# Patient Record
Sex: MALE | Race: WHITE | NOT HISPANIC OR LATINO | Employment: OTHER | URBAN - METROPOLITAN AREA
[De-identification: names, ages, dates, MRNs, and addresses within clinical notes are randomized per-mention and may not be internally consistent; named-entity substitution may affect disease eponyms.]

---

## 2017-01-09 ENCOUNTER — OFFICE VISIT (OUTPATIENT)
Dept: CARDIAC REHAB | Facility: HOSPITAL | Age: 82
End: 2017-01-09

## 2017-01-09 DIAGNOSIS — Z95.5 HISTORY OF PLACEMENT OF STENT IN LAD CORONARY ARTERY: Primary | ICD-10-CM

## 2017-01-13 ENCOUNTER — OFFICE VISIT (OUTPATIENT)
Dept: CARDIAC REHAB | Facility: HOSPITAL | Age: 82
End: 2017-01-13

## 2017-01-13 DIAGNOSIS — Z95.5 HISTORY OF PLACEMENT OF STENT IN LAD CORONARY ARTERY: Primary | ICD-10-CM

## 2017-01-16 ENCOUNTER — OFFICE VISIT (OUTPATIENT)
Dept: CARDIAC REHAB | Facility: HOSPITAL | Age: 82
End: 2017-01-16

## 2017-01-16 DIAGNOSIS — Z95.5 HISTORY OF PLACEMENT OF STENT IN LAD CORONARY ARTERY: Primary | ICD-10-CM

## 2017-01-18 ENCOUNTER — OFFICE VISIT (OUTPATIENT)
Dept: CARDIAC REHAB | Facility: HOSPITAL | Age: 82
End: 2017-01-18

## 2017-01-18 DIAGNOSIS — Z95.5 HISTORY OF PLACEMENT OF STENT IN LAD CORONARY ARTERY: Primary | ICD-10-CM

## 2017-01-20 ENCOUNTER — OFFICE VISIT (OUTPATIENT)
Dept: CARDIAC REHAB | Facility: HOSPITAL | Age: 82
End: 2017-01-20

## 2017-01-20 DIAGNOSIS — Z95.5 HISTORY OF PLACEMENT OF STENT IN LAD CORONARY ARTERY: Primary | ICD-10-CM

## 2017-01-23 ENCOUNTER — TRANSCRIBE ORDERS (OUTPATIENT)
Dept: CARDIAC REHAB | Facility: HOSPITAL | Age: 82
End: 2017-01-23

## 2017-01-23 ENCOUNTER — OFFICE VISIT (OUTPATIENT)
Dept: CARDIAC REHAB | Facility: HOSPITAL | Age: 82
End: 2017-01-23

## 2017-01-23 DIAGNOSIS — Z95.5 HISTORY OF PLACEMENT OF STENT IN LAD CORONARY ARTERY: Primary | ICD-10-CM

## 2017-01-25 ENCOUNTER — OFFICE VISIT (OUTPATIENT)
Dept: CARDIAC REHAB | Facility: HOSPITAL | Age: 82
End: 2017-01-25

## 2017-01-25 DIAGNOSIS — Z95.5 HISTORY OF PLACEMENT OF STENT IN LAD CORONARY ARTERY: Primary | ICD-10-CM

## 2017-01-30 ENCOUNTER — APPOINTMENT (OUTPATIENT)
Dept: CARDIAC REHAB | Facility: HOSPITAL | Age: 82
End: 2017-01-30

## 2017-01-30 ENCOUNTER — OFFICE VISIT (OUTPATIENT)
Dept: CARDIAC REHAB | Facility: HOSPITAL | Age: 82
End: 2017-01-30

## 2017-01-30 DIAGNOSIS — Z95.5 HISTORY OF PLACEMENT OF STENT IN LAD CORONARY ARTERY: Primary | ICD-10-CM

## 2017-02-01 ENCOUNTER — OFFICE VISIT (OUTPATIENT)
Dept: CARDIAC REHAB | Facility: HOSPITAL | Age: 82
End: 2017-02-01

## 2017-02-01 ENCOUNTER — APPOINTMENT (OUTPATIENT)
Dept: CARDIAC REHAB | Facility: HOSPITAL | Age: 82
End: 2017-02-01

## 2017-02-01 DIAGNOSIS — Z95.5 HISTORY OF PLACEMENT OF STENT IN LAD CORONARY ARTERY: Primary | ICD-10-CM

## 2017-02-03 ENCOUNTER — OFFICE VISIT (OUTPATIENT)
Dept: CARDIAC REHAB | Facility: HOSPITAL | Age: 82
End: 2017-02-03

## 2017-02-03 DIAGNOSIS — Z95.5 HISTORY OF PLACEMENT OF STENT IN LAD CORONARY ARTERY: Primary | ICD-10-CM

## 2017-02-06 ENCOUNTER — OFFICE VISIT (OUTPATIENT)
Dept: CARDIAC REHAB | Facility: HOSPITAL | Age: 82
End: 2017-02-06

## 2017-02-06 DIAGNOSIS — Z95.5 HISTORY OF PLACEMENT OF STENT IN LAD CORONARY ARTERY: Primary | ICD-10-CM

## 2017-02-08 ENCOUNTER — OFFICE VISIT (OUTPATIENT)
Dept: CARDIAC REHAB | Facility: HOSPITAL | Age: 82
End: 2017-02-08

## 2017-02-08 DIAGNOSIS — Z95.5 HISTORY OF PLACEMENT OF STENT IN LAD CORONARY ARTERY: Primary | ICD-10-CM

## 2017-02-27 ENCOUNTER — OFFICE VISIT (OUTPATIENT)
Dept: CARDIAC REHAB | Facility: HOSPITAL | Age: 82
End: 2017-02-27

## 2017-02-27 DIAGNOSIS — Z95.5 HISTORY OF PLACEMENT OF STENT IN LAD CORONARY ARTERY: Primary | ICD-10-CM

## 2017-03-01 ENCOUNTER — OFFICE VISIT (OUTPATIENT)
Dept: CARDIAC REHAB | Facility: HOSPITAL | Age: 82
End: 2017-03-01

## 2017-03-01 DIAGNOSIS — Z95.5 HISTORY OF PLACEMENT OF STENT IN LAD CORONARY ARTERY: Primary | ICD-10-CM

## 2017-03-03 ENCOUNTER — OFFICE VISIT (OUTPATIENT)
Dept: CARDIAC REHAB | Facility: HOSPITAL | Age: 82
End: 2017-03-03

## 2017-03-03 DIAGNOSIS — Z95.5 HISTORY OF PLACEMENT OF STENT IN LAD CORONARY ARTERY: Primary | ICD-10-CM

## 2017-03-06 ENCOUNTER — OFFICE VISIT (OUTPATIENT)
Dept: CARDIAC REHAB | Facility: HOSPITAL | Age: 82
End: 2017-03-06

## 2017-03-06 DIAGNOSIS — Z95.5 HISTORY OF PLACEMENT OF STENT IN LAD CORONARY ARTERY: Primary | ICD-10-CM

## 2017-03-08 ENCOUNTER — OFFICE VISIT (OUTPATIENT)
Dept: CARDIAC REHAB | Facility: HOSPITAL | Age: 82
End: 2017-03-08

## 2017-03-08 DIAGNOSIS — Z95.5 HISTORY OF PLACEMENT OF STENT IN LAD CORONARY ARTERY: Primary | ICD-10-CM

## 2017-03-10 ENCOUNTER — OFFICE VISIT (OUTPATIENT)
Dept: CARDIAC REHAB | Facility: HOSPITAL | Age: 82
End: 2017-03-10

## 2017-03-10 DIAGNOSIS — Z95.5 HISTORY OF PLACEMENT OF STENT IN LAD CORONARY ARTERY: Primary | ICD-10-CM

## 2017-03-13 ENCOUNTER — OFFICE VISIT (OUTPATIENT)
Dept: CARDIAC REHAB | Facility: HOSPITAL | Age: 82
End: 2017-03-13

## 2017-03-13 DIAGNOSIS — Z95.5 HISTORY OF PLACEMENT OF STENT IN LAD CORONARY ARTERY: Primary | ICD-10-CM

## 2017-03-15 ENCOUNTER — OFFICE VISIT (OUTPATIENT)
Dept: CARDIAC REHAB | Facility: HOSPITAL | Age: 82
End: 2017-03-15

## 2017-03-15 DIAGNOSIS — Z95.5 HISTORY OF PLACEMENT OF STENT IN LAD CORONARY ARTERY: Primary | ICD-10-CM

## 2017-03-20 ENCOUNTER — OFFICE VISIT (OUTPATIENT)
Dept: CARDIAC REHAB | Facility: HOSPITAL | Age: 82
End: 2017-03-20

## 2017-03-20 DIAGNOSIS — Z95.5 HISTORY OF PLACEMENT OF STENT IN LAD CORONARY ARTERY: Primary | ICD-10-CM

## 2017-03-22 ENCOUNTER — OFFICE VISIT (OUTPATIENT)
Dept: CARDIAC REHAB | Facility: HOSPITAL | Age: 82
End: 2017-03-22

## 2017-03-22 DIAGNOSIS — Z95.5 HISTORY OF PLACEMENT OF STENT IN LAD CORONARY ARTERY: Primary | ICD-10-CM

## 2017-03-24 ENCOUNTER — OFFICE VISIT (OUTPATIENT)
Dept: CARDIAC REHAB | Facility: HOSPITAL | Age: 82
End: 2017-03-24

## 2017-03-24 DIAGNOSIS — Z95.5 HISTORY OF PLACEMENT OF STENT IN LAD CORONARY ARTERY: Primary | ICD-10-CM

## 2017-03-27 ENCOUNTER — OFFICE VISIT (OUTPATIENT)
Dept: CARDIAC REHAB | Facility: HOSPITAL | Age: 82
End: 2017-03-27

## 2017-03-27 DIAGNOSIS — Z95.5 HISTORY OF PLACEMENT OF STENT IN LAD CORONARY ARTERY: Primary | ICD-10-CM

## 2017-03-29 ENCOUNTER — OFFICE VISIT (OUTPATIENT)
Dept: CARDIAC REHAB | Facility: HOSPITAL | Age: 82
End: 2017-03-29

## 2017-03-29 DIAGNOSIS — Z95.5 HISTORY OF PLACEMENT OF STENT IN LAD CORONARY ARTERY: Primary | ICD-10-CM

## 2017-03-31 ENCOUNTER — OFFICE VISIT (OUTPATIENT)
Dept: CARDIAC REHAB | Facility: HOSPITAL | Age: 82
End: 2017-03-31

## 2017-03-31 DIAGNOSIS — Z95.5 HISTORY OF PLACEMENT OF STENT IN LAD CORONARY ARTERY: Primary | ICD-10-CM

## 2017-04-05 ENCOUNTER — OFFICE VISIT (OUTPATIENT)
Dept: CARDIAC REHAB | Facility: HOSPITAL | Age: 82
End: 2017-04-05

## 2017-04-05 DIAGNOSIS — Z95.5 HISTORY OF PLACEMENT OF STENT IN LAD CORONARY ARTERY: Primary | ICD-10-CM

## 2017-04-07 ENCOUNTER — OFFICE VISIT (OUTPATIENT)
Dept: CARDIAC REHAB | Facility: HOSPITAL | Age: 82
End: 2017-04-07

## 2017-04-07 DIAGNOSIS — Z95.5 HISTORY OF PLACEMENT OF STENT IN LAD CORONARY ARTERY: Primary | ICD-10-CM

## 2017-04-10 ENCOUNTER — OFFICE VISIT (OUTPATIENT)
Dept: CARDIAC REHAB | Facility: HOSPITAL | Age: 82
End: 2017-04-10

## 2017-04-10 DIAGNOSIS — Z95.5 HISTORY OF PLACEMENT OF STENT IN LAD CORONARY ARTERY: Primary | ICD-10-CM

## 2017-04-12 ENCOUNTER — OFFICE VISIT (OUTPATIENT)
Dept: CARDIAC REHAB | Facility: HOSPITAL | Age: 82
End: 2017-04-12

## 2017-04-12 DIAGNOSIS — Z95.5 HISTORY OF PLACEMENT OF STENT IN LAD CORONARY ARTERY: Primary | ICD-10-CM

## 2017-04-17 ENCOUNTER — OFFICE VISIT (OUTPATIENT)
Dept: CARDIAC REHAB | Facility: HOSPITAL | Age: 82
End: 2017-04-17

## 2017-04-17 DIAGNOSIS — Z95.5 HISTORY OF PLACEMENT OF STENT IN LAD CORONARY ARTERY: Primary | ICD-10-CM

## 2017-04-19 ENCOUNTER — OFFICE VISIT (OUTPATIENT)
Dept: CARDIAC REHAB | Facility: HOSPITAL | Age: 82
End: 2017-04-19

## 2017-04-19 DIAGNOSIS — Z95.5 HISTORY OF PLACEMENT OF STENT IN LAD CORONARY ARTERY: Primary | ICD-10-CM

## 2017-04-26 ENCOUNTER — OFFICE VISIT (OUTPATIENT)
Dept: CARDIAC REHAB | Facility: HOSPITAL | Age: 82
End: 2017-04-26

## 2017-04-26 DIAGNOSIS — Z95.5 HISTORY OF PLACEMENT OF STENT IN LAD CORONARY ARTERY: Primary | ICD-10-CM

## 2017-04-28 ENCOUNTER — OFFICE VISIT (OUTPATIENT)
Dept: CARDIAC REHAB | Facility: HOSPITAL | Age: 82
End: 2017-04-28

## 2017-04-28 DIAGNOSIS — Z95.5 HISTORY OF PLACEMENT OF STENT IN LAD CORONARY ARTERY: Primary | ICD-10-CM

## 2017-05-01 ENCOUNTER — OFFICE VISIT (OUTPATIENT)
Dept: CARDIAC REHAB | Facility: HOSPITAL | Age: 82
End: 2017-05-01

## 2017-05-01 DIAGNOSIS — Z95.5 HISTORY OF PLACEMENT OF STENT IN LAD CORONARY ARTERY: Primary | ICD-10-CM

## 2017-05-03 ENCOUNTER — OFFICE VISIT (OUTPATIENT)
Dept: CARDIAC REHAB | Facility: HOSPITAL | Age: 82
End: 2017-05-03

## 2017-05-03 DIAGNOSIS — Z95.5 HISTORY OF PLACEMENT OF STENT IN LAD CORONARY ARTERY: Primary | ICD-10-CM

## 2017-05-08 ENCOUNTER — OFFICE VISIT (OUTPATIENT)
Dept: CARDIAC REHAB | Facility: HOSPITAL | Age: 82
End: 2017-05-08

## 2017-05-08 DIAGNOSIS — I10 ESSENTIAL HYPERTENSION: ICD-10-CM

## 2017-05-08 DIAGNOSIS — R73.01 IMPAIRED FASTING GLUCOSE: ICD-10-CM

## 2017-05-08 DIAGNOSIS — D64.89 ANEMIA DUE TO OTHER CAUSE, NOT CLASSIFIED: ICD-10-CM

## 2017-05-08 DIAGNOSIS — I25.10 ARTERIOSCLEROSIS OF CORONARY ARTERY: ICD-10-CM

## 2017-05-08 DIAGNOSIS — Z95.5 HISTORY OF PLACEMENT OF STENT IN LAD CORONARY ARTERY: Primary | ICD-10-CM

## 2017-05-08 DIAGNOSIS — E78.00 PURE HYPERCHOLESTEROLEMIA: Primary | ICD-10-CM

## 2017-05-10 ENCOUNTER — OFFICE VISIT (OUTPATIENT)
Dept: CARDIAC REHAB | Facility: HOSPITAL | Age: 82
End: 2017-05-10

## 2017-05-10 DIAGNOSIS — Z95.5 HISTORY OF PLACEMENT OF STENT IN LAD CORONARY ARTERY: Primary | ICD-10-CM

## 2017-05-10 LAB
ALBUMIN SERPL-MCNC: 3.8 G/DL (ref 3.5–5.2)
ALBUMIN/GLOB SERPL: 1.3 G/DL
ALP SERPL-CCNC: 61 U/L (ref 39–117)
ALT SERPL-CCNC: 22 U/L (ref 1–41)
APPEARANCE UR: CLEAR
AST SERPL-CCNC: 22 U/L (ref 1–40)
BACTERIA #/AREA URNS HPF: NORMAL /HPF
BASOPHILS # BLD AUTO: 0.04 10*3/MM3 (ref 0–0.2)
BASOPHILS NFR BLD AUTO: 0.7 % (ref 0–1.5)
BILIRUB SERPL-MCNC: 0.6 MG/DL (ref 0.1–1.2)
BILIRUB UR QL STRIP: NEGATIVE
BUN SERPL-MCNC: 14 MG/DL (ref 8–23)
BUN/CREAT SERPL: 16.5 (ref 7–25)
CALCIUM SERPL-MCNC: 9.5 MG/DL (ref 8.6–10.5)
CHLORIDE SERPL-SCNC: 101 MMOL/L (ref 98–107)
CHOLEST SERPL-MCNC: 116 MG/DL (ref 0–200)
CO2 SERPL-SCNC: 26.4 MMOL/L (ref 22–29)
COLOR UR: YELLOW
CREAT SERPL-MCNC: 0.85 MG/DL (ref 0.76–1.27)
EOSINOPHIL # BLD AUTO: 0.5 10*3/MM3 (ref 0–0.7)
EOSINOPHIL NFR BLD AUTO: 8.7 % (ref 0.3–6.2)
EPI CELLS #/AREA URNS HPF: NORMAL /HPF
ERYTHROCYTE [DISTWIDTH] IN BLOOD BY AUTOMATED COUNT: 14.5 % (ref 11.5–14.5)
GLOBULIN SER CALC-MCNC: 3 GM/DL
GLUCOSE SERPL-MCNC: 97 MG/DL (ref 65–99)
GLUCOSE UR QL: NEGATIVE
HBA1C MFR BLD: 5.2 % (ref 4.8–5.6)
HCT VFR BLD AUTO: 45.8 % (ref 40.4–52.2)
HDLC SERPL-MCNC: 32 MG/DL (ref 40–60)
HGB BLD-MCNC: 14.8 G/DL (ref 13.7–17.6)
HGB UR QL STRIP: NEGATIVE
IMM GRANULOCYTES # BLD: 0 10*3/MM3 (ref 0–0.03)
IMM GRANULOCYTES NFR BLD: 0 % (ref 0–0.5)
KETONES UR QL STRIP: NEGATIVE
LDLC SERPL CALC-MCNC: 70 MG/DL (ref 0–100)
LDLC/HDLC SERPL: 2.2 {RATIO}
LEUKOCYTE ESTERASE UR QL STRIP: NEGATIVE
LYMPHOCYTES # BLD AUTO: 1.52 10*3/MM3 (ref 0.9–4.8)
LYMPHOCYTES NFR BLD AUTO: 26.3 % (ref 19.6–45.3)
MCH RBC QN AUTO: 31.5 PG (ref 27–32.7)
MCHC RBC AUTO-ENTMCNC: 32.3 G/DL (ref 32.6–36.4)
MCV RBC AUTO: 97.4 FL (ref 79.8–96.2)
MICRO URNS: NORMAL
MICRO URNS: NORMAL
MONOCYTES # BLD AUTO: 0.56 10*3/MM3 (ref 0.2–1.2)
MONOCYTES NFR BLD AUTO: 9.7 % (ref 5–12)
MUCOUS THREADS URNS QL MICRO: PRESENT /HPF
NEUTROPHILS # BLD AUTO: 3.15 10*3/MM3 (ref 1.9–8.1)
NEUTROPHILS NFR BLD AUTO: 54.6 % (ref 42.7–76)
NITRITE UR QL STRIP: NEGATIVE
PH UR STRIP: 6 [PH] (ref 5–7.5)
PLATELET # BLD AUTO: 206 10*3/MM3 (ref 140–500)
POTASSIUM SERPL-SCNC: 4.5 MMOL/L (ref 3.5–5.2)
PROT SERPL-MCNC: 6.8 G/DL (ref 6–8.5)
PROT UR QL STRIP: NEGATIVE
RBC # BLD AUTO: 4.7 10*6/MM3 (ref 4.6–6)
RBC #/AREA URNS HPF: NORMAL /HPF
SODIUM SERPL-SCNC: 140 MMOL/L (ref 136–145)
SP GR UR: 1.02 (ref 1–1.03)
TRIGL SERPL-MCNC: 68 MG/DL (ref 0–150)
TSH SERPL DL<=0.005 MIU/L-ACNC: 3.18 MIU/ML (ref 0.27–4.2)
URINALYSIS REFLEX: NORMAL
UROBILINOGEN UR STRIP-MCNC: 1 MG/DL (ref 0.2–1)
VLDLC SERPL CALC-MCNC: 13.6 MG/DL (ref 5–40)
WBC # BLD AUTO: 5.77 10*3/MM3 (ref 4.5–10.7)
WBC #/AREA URNS HPF: NORMAL /HPF

## 2017-05-22 ENCOUNTER — OFFICE VISIT (OUTPATIENT)
Dept: CARDIAC REHAB | Facility: HOSPITAL | Age: 82
End: 2017-05-22

## 2017-05-22 DIAGNOSIS — Z95.5 HISTORY OF PLACEMENT OF STENT IN LAD CORONARY ARTERY: Primary | ICD-10-CM

## 2017-05-24 ENCOUNTER — OFFICE VISIT (OUTPATIENT)
Dept: CARDIAC REHAB | Facility: HOSPITAL | Age: 82
End: 2017-05-24

## 2017-05-24 DIAGNOSIS — Z95.5 HISTORY OF PLACEMENT OF STENT IN LAD CORONARY ARTERY: Primary | ICD-10-CM

## 2017-05-26 ENCOUNTER — OFFICE VISIT (OUTPATIENT)
Dept: INTERNAL MEDICINE | Facility: CLINIC | Age: 82
End: 2017-05-26

## 2017-05-26 ENCOUNTER — OFFICE VISIT (OUTPATIENT)
Dept: CARDIAC REHAB | Facility: HOSPITAL | Age: 82
End: 2017-05-26

## 2017-05-26 VITALS
HEIGHT: 72 IN | SYSTOLIC BLOOD PRESSURE: 132 MMHG | RESPIRATION RATE: 18 BRPM | BODY MASS INDEX: 33.18 KG/M2 | HEART RATE: 85 BPM | WEIGHT: 245 LBS | DIASTOLIC BLOOD PRESSURE: 82 MMHG | OXYGEN SATURATION: 96 %

## 2017-05-26 DIAGNOSIS — E78.00 PURE HYPERCHOLESTEROLEMIA: ICD-10-CM

## 2017-05-26 DIAGNOSIS — I48.20 CHRONIC ATRIAL FIBRILLATION (HCC): ICD-10-CM

## 2017-05-26 DIAGNOSIS — J30.2 SEASONAL ALLERGIC RHINITIS, UNSPECIFIED ALLERGIC RHINITIS TRIGGER: ICD-10-CM

## 2017-05-26 DIAGNOSIS — G47.33 OBSTRUCTIVE APNEA: ICD-10-CM

## 2017-05-26 DIAGNOSIS — M17.11 PRIMARY OSTEOARTHRITIS OF RIGHT KNEE: Primary | ICD-10-CM

## 2017-05-26 DIAGNOSIS — Z00.00 HEALTHCARE MAINTENANCE: ICD-10-CM

## 2017-05-26 DIAGNOSIS — I10 ESSENTIAL HYPERTENSION: ICD-10-CM

## 2017-05-26 DIAGNOSIS — I25.10 ARTERIOSCLEROSIS OF CORONARY ARTERY: ICD-10-CM

## 2017-05-26 DIAGNOSIS — Z95.5 HISTORY OF PLACEMENT OF STENT IN LAD CORONARY ARTERY: Primary | ICD-10-CM

## 2017-05-26 PROCEDURE — G0439 PPPS, SUBSEQ VISIT: HCPCS | Performed by: INTERNAL MEDICINE

## 2017-05-26 PROCEDURE — 99213 OFFICE O/P EST LOW 20 MIN: CPT | Performed by: INTERNAL MEDICINE

## 2017-05-26 RX ORDER — TROSPIUM CHLORIDE 20 MG/1
20 TABLET, FILM COATED ORAL 2 TIMES DAILY
COMMUNITY

## 2017-05-26 RX ORDER — FLUTICASONE PROPIONATE 50 MCG
2 SPRAY, SUSPENSION (ML) NASAL DAILY
Qty: 1 EACH | Refills: 6 | Status: SHIPPED | OUTPATIENT
Start: 2017-05-26 | End: 2017-06-25

## 2017-05-26 RX ORDER — NITROGLYCERIN 0.4 MG/1
0.4 TABLET SUBLINGUAL
COMMUNITY
Start: 2015-08-11

## 2017-05-31 ENCOUNTER — OFFICE VISIT (OUTPATIENT)
Dept: CARDIAC REHAB | Facility: HOSPITAL | Age: 82
End: 2017-05-31

## 2017-05-31 DIAGNOSIS — Z95.5 HISTORY OF PLACEMENT OF STENT IN LAD CORONARY ARTERY: Primary | ICD-10-CM

## 2017-06-02 ENCOUNTER — OFFICE VISIT (OUTPATIENT)
Dept: CARDIAC REHAB | Facility: HOSPITAL | Age: 82
End: 2017-06-02

## 2017-06-02 DIAGNOSIS — Z95.5 HISTORY OF PLACEMENT OF STENT IN LAD CORONARY ARTERY: Primary | ICD-10-CM

## 2017-06-05 ENCOUNTER — OFFICE VISIT (OUTPATIENT)
Dept: CARDIAC REHAB | Facility: HOSPITAL | Age: 82
End: 2017-06-05

## 2017-06-05 DIAGNOSIS — Z95.5 HISTORY OF PLACEMENT OF STENT IN LAD CORONARY ARTERY: Primary | ICD-10-CM

## 2017-06-07 ENCOUNTER — OFFICE VISIT (OUTPATIENT)
Dept: CARDIAC REHAB | Facility: HOSPITAL | Age: 82
End: 2017-06-07

## 2017-06-07 DIAGNOSIS — Z95.5 HISTORY OF PLACEMENT OF STENT IN LAD CORONARY ARTERY: Primary | ICD-10-CM

## 2017-06-12 ENCOUNTER — OFFICE VISIT (OUTPATIENT)
Dept: CARDIAC REHAB | Facility: HOSPITAL | Age: 82
End: 2017-06-12

## 2017-06-12 DIAGNOSIS — Z95.5 HISTORY OF PLACEMENT OF STENT IN LAD CORONARY ARTERY: Primary | ICD-10-CM

## 2017-06-14 ENCOUNTER — OFFICE VISIT (OUTPATIENT)
Dept: CARDIAC REHAB | Facility: HOSPITAL | Age: 82
End: 2017-06-14

## 2017-06-14 DIAGNOSIS — Z95.5 HISTORY OF PLACEMENT OF STENT IN LAD CORONARY ARTERY: Primary | ICD-10-CM

## 2017-06-19 ENCOUNTER — OFFICE VISIT (OUTPATIENT)
Dept: CARDIAC REHAB | Facility: HOSPITAL | Age: 82
End: 2017-06-19

## 2017-06-19 DIAGNOSIS — Z95.5 HISTORY OF PLACEMENT OF STENT IN LAD CORONARY ARTERY: Primary | ICD-10-CM

## 2017-06-21 ENCOUNTER — OFFICE VISIT (OUTPATIENT)
Dept: CARDIAC REHAB | Facility: HOSPITAL | Age: 82
End: 2017-06-21

## 2017-06-21 DIAGNOSIS — Z95.5 HISTORY OF PLACEMENT OF STENT IN LAD CORONARY ARTERY: Primary | ICD-10-CM

## 2017-06-23 ENCOUNTER — OFFICE VISIT (OUTPATIENT)
Dept: CARDIAC REHAB | Facility: HOSPITAL | Age: 82
End: 2017-06-23

## 2017-06-23 DIAGNOSIS — Z95.5 HISTORY OF PLACEMENT OF STENT IN LAD CORONARY ARTERY: Primary | ICD-10-CM

## 2017-06-26 ENCOUNTER — OFFICE VISIT (OUTPATIENT)
Dept: CARDIAC REHAB | Facility: HOSPITAL | Age: 82
End: 2017-06-26

## 2017-06-26 DIAGNOSIS — Z95.5 HISTORY OF PLACEMENT OF STENT IN LAD CORONARY ARTERY: Primary | ICD-10-CM

## 2017-06-28 ENCOUNTER — OFFICE VISIT (OUTPATIENT)
Dept: CARDIAC REHAB | Facility: HOSPITAL | Age: 82
End: 2017-06-28

## 2017-06-28 DIAGNOSIS — Z95.5 HISTORY OF PLACEMENT OF STENT IN LAD CORONARY ARTERY: Primary | ICD-10-CM

## 2017-06-30 ENCOUNTER — OFFICE VISIT (OUTPATIENT)
Dept: CARDIAC REHAB | Facility: HOSPITAL | Age: 82
End: 2017-06-30

## 2017-06-30 DIAGNOSIS — Z95.5 HISTORY OF PLACEMENT OF STENT IN LAD CORONARY ARTERY: Primary | ICD-10-CM

## 2017-07-05 ENCOUNTER — OFFICE VISIT (OUTPATIENT)
Dept: CARDIAC REHAB | Facility: HOSPITAL | Age: 82
End: 2017-07-05

## 2017-07-05 DIAGNOSIS — Z95.5 HISTORY OF PLACEMENT OF STENT IN LAD CORONARY ARTERY: Primary | ICD-10-CM

## 2017-07-07 ENCOUNTER — OFFICE VISIT (OUTPATIENT)
Dept: CARDIAC REHAB | Facility: HOSPITAL | Age: 82
End: 2017-07-07

## 2017-07-07 DIAGNOSIS — Z95.5 HISTORY OF PLACEMENT OF STENT IN LAD CORONARY ARTERY: Primary | ICD-10-CM

## 2017-07-10 ENCOUNTER — OFFICE VISIT (OUTPATIENT)
Dept: CARDIAC REHAB | Facility: HOSPITAL | Age: 82
End: 2017-07-10

## 2017-07-10 DIAGNOSIS — Z95.5 HISTORY OF PLACEMENT OF STENT IN LAD CORONARY ARTERY: Primary | ICD-10-CM

## 2017-07-12 ENCOUNTER — OFFICE VISIT (OUTPATIENT)
Dept: CARDIAC REHAB | Facility: HOSPITAL | Age: 82
End: 2017-07-12

## 2017-07-12 DIAGNOSIS — Z95.5 HISTORY OF PLACEMENT OF STENT IN LAD CORONARY ARTERY: Primary | ICD-10-CM

## 2017-07-14 ENCOUNTER — OFFICE VISIT (OUTPATIENT)
Dept: CARDIAC REHAB | Facility: HOSPITAL | Age: 82
End: 2017-07-14

## 2017-07-14 DIAGNOSIS — Z95.5 HISTORY OF PLACEMENT OF STENT IN LAD CORONARY ARTERY: Primary | ICD-10-CM

## 2017-07-17 ENCOUNTER — OFFICE VISIT (OUTPATIENT)
Dept: CARDIAC REHAB | Facility: HOSPITAL | Age: 82
End: 2017-07-17

## 2017-07-17 DIAGNOSIS — Z95.5 HISTORY OF PLACEMENT OF STENT IN LAD CORONARY ARTERY: Primary | ICD-10-CM

## 2017-07-19 ENCOUNTER — OFFICE VISIT (OUTPATIENT)
Dept: CARDIAC REHAB | Facility: HOSPITAL | Age: 82
End: 2017-07-19

## 2017-07-19 DIAGNOSIS — Z95.5 HISTORY OF PLACEMENT OF STENT IN LAD CORONARY ARTERY: Primary | ICD-10-CM

## 2017-07-21 ENCOUNTER — OFFICE VISIT (OUTPATIENT)
Dept: CARDIAC REHAB | Facility: HOSPITAL | Age: 82
End: 2017-07-21

## 2017-07-21 DIAGNOSIS — Z95.5 HISTORY OF PLACEMENT OF STENT IN LAD CORONARY ARTERY: Primary | ICD-10-CM

## 2017-07-24 ENCOUNTER — OFFICE VISIT (OUTPATIENT)
Dept: CARDIAC REHAB | Facility: HOSPITAL | Age: 82
End: 2017-07-24

## 2017-07-24 DIAGNOSIS — Z95.5 HISTORY OF PLACEMENT OF STENT IN LAD CORONARY ARTERY: Primary | ICD-10-CM

## 2017-07-28 ENCOUNTER — OFFICE VISIT (OUTPATIENT)
Dept: CARDIAC REHAB | Facility: HOSPITAL | Age: 82
End: 2017-07-28

## 2017-07-28 DIAGNOSIS — Z95.5 HISTORY OF PLACEMENT OF STENT IN LAD CORONARY ARTERY: Primary | ICD-10-CM

## 2017-07-31 ENCOUNTER — OFFICE VISIT (OUTPATIENT)
Dept: CARDIAC REHAB | Facility: HOSPITAL | Age: 82
End: 2017-07-31

## 2017-07-31 DIAGNOSIS — Z95.5 HISTORY OF PLACEMENT OF STENT IN LAD CORONARY ARTERY: Primary | ICD-10-CM

## 2017-08-07 ENCOUNTER — OFFICE VISIT (OUTPATIENT)
Dept: INTERNAL MEDICINE | Facility: CLINIC | Age: 82
End: 2017-08-07

## 2017-08-07 VITALS
OXYGEN SATURATION: 99 % | SYSTOLIC BLOOD PRESSURE: 140 MMHG | WEIGHT: 250 LBS | HEART RATE: 74 BPM | BODY MASS INDEX: 34.38 KG/M2 | DIASTOLIC BLOOD PRESSURE: 60 MMHG

## 2017-08-07 DIAGNOSIS — S30.0XXD TRAUMATIC ECCHYMOSIS OF BUTTOCK, SUBSEQUENT ENCOUNTER: ICD-10-CM

## 2017-08-07 DIAGNOSIS — I48.20 CHRONIC ATRIAL FIBRILLATION (HCC): ICD-10-CM

## 2017-08-07 DIAGNOSIS — S81.811A LACERATION OF LOWER EXTREMITY, RIGHT, INITIAL ENCOUNTER: Primary | ICD-10-CM

## 2017-08-07 PROCEDURE — 99214 OFFICE O/P EST MOD 30 MIN: CPT | Performed by: INTERNAL MEDICINE

## 2017-08-07 NOTE — PROGRESS NOTES
Chief Complaint   Patient presents with   • Follow-up     from ER  pt had a fall        History of Present Illness   Scar Pérez is a 83 y.o. male presents for follow up evaluation. Patient had a fall one week. Was playing [employer]. Swinging a broomstick and lost balance. Then fell and struck his right leg. Went to ER. Had negative CT head/ neck. Neg xray, rib, elbow, knee, tib fib. He has a persistent wound for which he went to wound care at [place] and is following there weekly. Doing home dressing changes w/ wife.   Today he denies any headaches or instablitiy.   Mild pain left rib. This is managed w/ tylenol arthritis strength.   He has afib and is rate controlled. On daily asa for this.       The following portions of the patient's history were reviewed and updated as appropriate: allergies, current medications, past family history, past medical history, past social history, past surgical history and problem list.  Current Outpatient Prescriptions on File Prior to Visit   Medication Sig Dispense Refill   • aspirin 325 MG tablet Take 1 tablet by mouth daily.     • atorvastatin (LIPITOR) 20 MG tablet Take 20 mg by mouth daily.     • Cholecalciferol (VITAMIN D3) 5000 UNITS tablet Take by mouth.     • Cyanocobalamin (B-12) 1000 MCG capsule Take 1 capsule by mouth.     • fluocinolone (SYNALAR) 0.01 % external solution Apply topically. APPLY TO AFFECTED AREA AS DIRECTED     • hydrocortisone (ANUSOL-HC) 25 MG suppository Hydrocortisone Acetate 25 MG Rectal Suppository; Patient Sig: Hydrocortisone Acetate 25 MG Rectal Suppository INSERT ONE SUPPOSITORY RECTALLY TWICE DAILY AS NEEDED; 12; 4; 19-Nov-2013; Active     • metoprolol tartrate (LOPRESSOR) 100 MG tablet Take 1 tablet by mouth 2 (two) times a day.     • niacin 500 MG tablet Take 1 tablet by mouth daily.     • nitroglycerin (NITROSTAT) 0.4 MG SL tablet Place 0.4 mg under the tongue.     • pantoprazole (PROTONIX) 40 MG EC tablet Take 1 tablet by mouth daily.      • trospium (SANCTURA) 20 MG tablet Take 20 mg by mouth.     • valsartan (DIOVAN) 80 MG tablet Take 80 mg by mouth Daily.       No current facility-administered medications on file prior to visit.      Review of Systems   Constitutional: Negative.    HENT: Negative.    Eyes: Negative.    Respiratory: Negative.  Negative for shortness of breath.    Cardiovascular: Negative.  Negative for palpitations and leg swelling.   Gastrointestinal: Negative.    Endocrine: Negative.    Genitourinary: Negative.    Musculoskeletal: Negative.    Skin: Negative.    Allergic/Immunologic: Negative.    Neurological: Negative.    Hematological: Negative.    Psychiatric/Behavioral: Negative.        Objective   Physical Exam   Constitutional: He appears well-developed and well-nourished.   HENT:   Head: Normocephalic and atraumatic.   Right Ear: External ear normal.   Left Ear: External ear normal.   Mouth/Throat: Oropharynx is clear and moist.   Eyes: Conjunctivae and EOM are normal. Pupils are equal, round, and reactive to light.   Neck: Normal range of motion. Neck supple.   Cardiovascular: Normal rate and regular rhythm.    Pulmonary/Chest: Effort normal and breath sounds normal.   Abdominal: Soft.   Skin:   Left thigh large echymosis  Right inner aspect lower leg w/ large wound. No erythema   Psychiatric: He has a normal mood and affect. His behavior is normal. Judgment and thought content normal.   Nursing note and vitals reviewed.       /60  Pulse 74  Wt 250 lb (113 kg)  SpO2 99%  BMI 34.38 kg/m2    Assessment/Plan   Diagnoses and all orders for this visit:    Laceration of lower extremity, right, initial encounter    Traumatic ecchymosis of buttock, subsequent encounter    Chronic atrial fibrillation    records from [facility] obtained and reviewed.   Patient w/ large contusion injury w/ echymosis. Fall due to attempt at sport. Given afib he will need to continue asa daily for now and will monitor for resolution of  bruising. He had negative imaging for fracture and no signs today of head trauma. He will f/u w/ wound care for left leg wound that may have delayed healing due to location but no signs of infection at this time.   F/u routinely.

## 2017-10-04 ENCOUNTER — OFFICE VISIT (OUTPATIENT)
Dept: CARDIAC REHAB | Facility: HOSPITAL | Age: 82
End: 2017-10-04

## 2017-10-04 DIAGNOSIS — Z95.5 HISTORY OF PLACEMENT OF STENT IN LAD CORONARY ARTERY: Primary | ICD-10-CM

## 2017-10-06 ENCOUNTER — OFFICE VISIT (OUTPATIENT)
Dept: CARDIAC REHAB | Facility: HOSPITAL | Age: 82
End: 2017-10-06

## 2017-10-06 DIAGNOSIS — Z95.5 HISTORY OF PLACEMENT OF STENT IN LAD CORONARY ARTERY: Primary | ICD-10-CM

## 2017-10-09 ENCOUNTER — OFFICE VISIT (OUTPATIENT)
Dept: CARDIAC REHAB | Facility: HOSPITAL | Age: 82
End: 2017-10-09

## 2017-10-09 DIAGNOSIS — Z95.5 HISTORY OF PLACEMENT OF STENT IN LAD CORONARY ARTERY: Primary | ICD-10-CM

## 2017-10-11 ENCOUNTER — OFFICE VISIT (OUTPATIENT)
Dept: CARDIAC REHAB | Facility: HOSPITAL | Age: 82
End: 2017-10-11

## 2017-10-11 DIAGNOSIS — Z95.5 HISTORY OF PLACEMENT OF STENT IN LAD CORONARY ARTERY: Primary | ICD-10-CM

## 2017-10-16 ENCOUNTER — OFFICE VISIT (OUTPATIENT)
Dept: CARDIAC REHAB | Facility: HOSPITAL | Age: 82
End: 2017-10-16

## 2017-10-16 DIAGNOSIS — Z95.5 HISTORY OF PLACEMENT OF STENT IN LAD CORONARY ARTERY: Primary | ICD-10-CM

## 2017-10-18 ENCOUNTER — OFFICE VISIT (OUTPATIENT)
Dept: INTERNAL MEDICINE | Facility: CLINIC | Age: 82
End: 2017-10-18

## 2017-10-18 VITALS — SYSTOLIC BLOOD PRESSURE: 180 MMHG | DIASTOLIC BLOOD PRESSURE: 100 MMHG | OXYGEN SATURATION: 97 % | HEART RATE: 67 BPM

## 2017-10-18 DIAGNOSIS — I10 ESSENTIAL HYPERTENSION: Primary | ICD-10-CM

## 2017-10-18 PROCEDURE — 99213 OFFICE O/P EST LOW 20 MIN: CPT | Performed by: INTERNAL MEDICINE

## 2017-10-18 RX ORDER — HYDROCHLOROTHIAZIDE 12.5 MG/1
12.5 CAPSULE, GELATIN COATED ORAL DAILY
Qty: 30 CAPSULE | Refills: 5 | Status: SHIPPED | OUTPATIENT
Start: 2017-10-18 | End: 2021-04-13 | Stop reason: SDUPTHER

## 2017-10-18 RX ORDER — VALSARTAN 80 MG/1
80 TABLET ORAL 2 TIMES DAILY
COMMUNITY
Start: 2017-10-18 | End: 2017-10-24 | Stop reason: DRUGHIGH

## 2017-10-18 NOTE — PROGRESS NOTES
Subjective     Scar Pérez is a 83 y.o. male who presents with   Chief Complaint   Patient presents with   • Hypertension       History of Present Illness     Doing well until Monday.  He didn't feel well so he took his BP and it was elevated.  No CP.  No HA.  SOA is stable.  Feeling a little fatigued.  It was 191/102 today.      Review of Systems   Constitutional: Positive for fatigue.   Respiratory: Negative for chest tightness and shortness of breath.    Cardiovascular: Negative for chest pain and leg swelling.   Neurological: Positive for light-headedness. Negative for headaches.       The following portions of the patient's history were reviewed and updated as appropriate: allergies, current medications and problem list.    Patient Active Problem List    Diagnosis Date Noted   • Pure hypercholesterolemia 11/11/2016   • Impaired fasting glucose 11/11/2016   • Absolute anemia 02/12/2016   • A-fib 02/12/2016   • Arteriosclerosis of coronary artery 02/12/2016   • Bleeding gastrointestinal 02/12/2016   • Lipoma of skin and subcutaneous tissue 02/12/2016   • Obstructive apnea 02/12/2016   • Hyperkalemia 02/12/2016   • Essential hypertension 02/12/2016       Current Outpatient Prescriptions on File Prior to Visit   Medication Sig Dispense Refill   • aspirin 325 MG tablet Take 1 tablet by mouth daily.     • atorvastatin (LIPITOR) 20 MG tablet Take 20 mg by mouth daily.     • Cholecalciferol (VITAMIN D3) 5000 UNITS tablet Take by mouth.     • Cyanocobalamin (B-12) 1000 MCG capsule Take 1 capsule by mouth.     • fluocinolone (SYNALAR) 0.01 % external solution Apply topically. APPLY TO AFFECTED AREA AS DIRECTED     • hydrocortisone (ANUSOL-HC) 25 MG suppository Hydrocortisone Acetate 25 MG Rectal Suppository; Patient Sig: Hydrocortisone Acetate 25 MG Rectal Suppository INSERT ONE SUPPOSITORY RECTALLY TWICE DAILY AS NEEDED; 12; 4; 19-Nov-2013; Active     • metoprolol tartrate (LOPRESSOR) 100 MG tablet Take 1 tablet  by mouth 2 (two) times a day.     • niacin 500 MG tablet Take 1 tablet by mouth daily.     • nitroglycerin (NITROSTAT) 0.4 MG SL tablet Place 0.4 mg under the tongue.     • pantoprazole (PROTONIX) 40 MG EC tablet Take 1 tablet by mouth daily.     • trospium (SANCTURA) 20 MG tablet Take 20 mg by mouth.     • [DISCONTINUED] valsartan (DIOVAN) 80 MG tablet Take 80 mg by mouth Daily.       No current facility-administered medications on file prior to visit.        Objective     /100  Pulse 67  SpO2 97%    Physical Exam   Constitutional: He is oriented to person, place, and time. He appears well-developed and well-nourished.   HENT:   Head: Atraumatic.   Cardiovascular: Normal rate, regular rhythm and normal heart sounds.    Pulmonary/Chest: Effort normal and breath sounds normal.   Neurological: He is alert and oriented to person, place, and time.   Skin: Skin is warm and dry.   Psychiatric: He has a normal mood and affect.       Assessment/Plan   Scar was seen today for hypertension.    Diagnoses and all orders for this visit:    Essential hypertension    Other orders  -     hydrochlorothiazide (MICROZIDE) 12.5 MG capsule; Take 1 capsule by mouth Daily.        Discussion    Patient presents as a walk in with poorly controlled HTN.  Trial of HCTZ 12.5.  Discussed with the patient how it works.  The patient will let me know of any side effects from the medication.    F/u with Dr. Fields next week.             Future Appointments  Date Time Provider Department Center   10/18/2017 12:45 PM MD [facility] None   10/20/2017 7:30 AM [facility]   10/23/2017 7:30 AM [facility]   10/24/2017 10:15 AM MD [facility] None   10/25/2017 7:30 AM [facility]   10/27/2017 7:30 AM [facility]   10/30/2017 7:30 AM [facility]   11/1/2017 7:30 AM [facility]    11/3/2017 7:30 AM [facility]   11/6/2017 7:30 AM [facility]   11/8/2017 7:30 AM [facility]   11/10/2017 7:30 AM [facility]   11/13/2017 7:30 AM [facility]   11/15/2017 7:30 AM [facility]   11/17/2017 7:30 AM [facility]   11/20/2017 7:30 AM [facility]   11/22/2017 7:30 AM [facility]   11/27/2017 7:30 AM [facility]   11/29/2017 7:30 AM [facility]   11/29/2017 8:40 AM [facility] None   12/1/2017 7:30 AM [facility]  [facility]   12/4/2017 7:30 AM [facility]   12/5/2017 8:30 AM Marcelle Fields MD [facility] None   12/6/2017 7:30 AM [facility]  [facility]   12/8/2017 7:30 AM [facility]   12/11/2017 7:30 AM [facility]   12/13/2017 7:30 AM [facility]  [facility]   12/15/2017 7:30 AM [facility]   12/18/2017 7:30 AM [facility]   12/20/2017 7:30 AM [facility]  [facility]   12/22/2017 7:30 AM [facility]   12/27/2017 7:30 AM [facility]   12/29/2017 7:30 AM [facility]  [facility]   1/3/2018 7:30 AM [facility]  [facility]   1/5/2018 7:30 AM [facility]   1/8/2018 7:30 AM [facility]  [facility]   1/10/2018 7:30 AM [facility]   1/12/2018 7:30 AM [facility]   1/15/2018 7:30 AM [facility]   1/17/2018 7:30 AM [facility]   1/19/2018 7:30 AM [facility]  [facility]   1/22/2018 7:30 AM [facility]  [facility]

## 2017-10-20 ENCOUNTER — TELEPHONE (OUTPATIENT)
Dept: INTERNAL MEDICINE | Facility: CLINIC | Age: 82
End: 2017-10-20

## 2017-10-20 NOTE — TELEPHONE ENCOUNTER
Pt called and his B/P has been fluctuation since he saw you on Wednesday. It is running high in the morning, but going steady in the afternoon. He took it at 7:45 and it was 176/94 and last night at 4:00 it was 124/72. He wants to know what he needs to do.

## 2017-10-20 NOTE — TELEPHONE ENCOUNTER
Advise patient to take both tablets of diovan in there morning. None in the evening. Continue all other meds as rx.

## 2017-10-23 ENCOUNTER — OFFICE VISIT (OUTPATIENT)
Dept: CARDIAC REHAB | Facility: HOSPITAL | Age: 82
End: 2017-10-23

## 2017-10-23 DIAGNOSIS — Z95.5 HISTORY OF PLACEMENT OF STENT IN LAD CORONARY ARTERY: Primary | ICD-10-CM

## 2017-10-24 ENCOUNTER — OFFICE VISIT (OUTPATIENT)
Dept: INTERNAL MEDICINE | Facility: CLINIC | Age: 82
End: 2017-10-24

## 2017-10-24 VITALS
WEIGHT: 244 LBS | OXYGEN SATURATION: 98 % | HEART RATE: 81 BPM | BODY MASS INDEX: 33.56 KG/M2 | SYSTOLIC BLOOD PRESSURE: 142 MMHG | DIASTOLIC BLOOD PRESSURE: 82 MMHG

## 2017-10-24 DIAGNOSIS — I10 BENIGN ESSENTIAL HYPERTENSION: Primary | ICD-10-CM

## 2017-10-24 DIAGNOSIS — I25.10 ARTERIOSCLEROSIS OF CORONARY ARTERY: ICD-10-CM

## 2017-10-24 DIAGNOSIS — N32.81 OAB (OVERACTIVE BLADDER): ICD-10-CM

## 2017-10-24 LAB
BUN SERPL-MCNC: 17 MG/DL (ref 8–23)
BUN/CREAT SERPL: 20.2 (ref 7–25)
CALCIUM SERPL-MCNC: 9.4 MG/DL (ref 8.6–10.5)
CHLORIDE SERPL-SCNC: 93 MMOL/L (ref 98–107)
CO2 SERPL-SCNC: 30 MMOL/L (ref 22–29)
CREAT SERPL-MCNC: 0.84 MG/DL (ref 0.76–1.27)
GFR SERPLBLD CREATININE-BSD FMLA CKD-EPI: 106 ML/MIN/1.73
GFR SERPLBLD CREATININE-BSD FMLA CKD-EPI: 87 ML/MIN/1.73
GLUCOSE SERPL-MCNC: 80 MG/DL (ref 65–99)
POTASSIUM SERPL-SCNC: 4.5 MMOL/L (ref 3.5–5.2)
SODIUM SERPL-SCNC: 132 MMOL/L (ref 136–145)

## 2017-10-24 PROCEDURE — 99214 OFFICE O/P EST MOD 30 MIN: CPT | Performed by: INTERNAL MEDICINE

## 2017-10-24 RX ORDER — VALSARTAN 160 MG/1
160 TABLET ORAL DAILY
Qty: 90 TABLET | Refills: 2 | Status: SHIPPED | OUTPATIENT
Start: 2017-10-24 | End: 2021-09-03 | Stop reason: ALTCHOICE

## 2017-10-24 NOTE — PROGRESS NOTES
Chief Complaint   Patient presents with   • Hypertension   htn, fatigue      History of Present Illness   Scar Pérez is a 83 y.o. male presents for follow up evaluation. He had an acute episode of hypertension. Started hctz and is now taking full 160 mg diovan in the morning (previous split dosing. He has had some fatigue. He is in cardiac rehab and feels that he is gradually improving in his level of energy. bp is now ranging predom 120-150/70-90. Unfortunately he is having increased urination and occasional leakage while taking hctz.         The following portions of the patient's history were reviewed and updated as appropriate: allergies, current medications, past family history, past medical history, past social history, past surgical history and problem list.  Current Outpatient Prescriptions on File Prior to Visit   Medication Sig Dispense Refill   • aspirin 325 MG tablet Take 1 tablet by mouth daily.     • atorvastatin (LIPITOR) 20 MG tablet Take 20 mg by mouth daily.     • Cholecalciferol (VITAMIN D3) 5000 UNITS tablet Take by mouth.     • Cyanocobalamin (B-12) 1000 MCG capsule Take 1 capsule by mouth.     • fluocinolone (SYNALAR) 0.01 % external solution Apply topically. APPLY TO AFFECTED AREA AS DIRECTED     • hydrochlorothiazide (MICROZIDE) 12.5 MG capsule Take 1 capsule by mouth Daily. 30 capsule 5   • hydrocortisone (ANUSOL-HC) 25 MG suppository Hydrocortisone Acetate 25 MG Rectal Suppository; Patient Sig: Hydrocortisone Acetate 25 MG Rectal Suppository INSERT ONE SUPPOSITORY RECTALLY TWICE DAILY AS NEEDED; 12; 4; 19-Nov-2013; Active     • metoprolol tartrate (LOPRESSOR) 100 MG tablet Take 1 tablet by mouth 2 (two) times a day.     • niacin 500 MG tablet Take 1 tablet by mouth daily.     • nitroglycerin (NITROSTAT) 0.4 MG SL tablet Place 0.4 mg under the tongue.     • pantoprazole (PROTONIX) 40 MG EC tablet Take 1 tablet by mouth daily.     • trospium (SANCTURA) 20 MG tablet Take 20 mg by mouth.      • [DISCONTINUED] valsartan (DIOVAN) 80 MG tablet Take 1 tablet by mouth 2 (Two) Times a Day.       No current facility-administered medications on file prior to visit.      Review of Systems   Constitutional: Positive for fatigue.   HENT: Positive for rhinorrhea and voice change.    Eyes: Negative.    Respiratory: Positive for shortness of breath.         Dyspnea w/ stair climbing/ chronic   Cardiovascular: Positive for leg swelling.        Swelling improved w/ hctz and compression stockings   Gastrointestinal: Negative.    Endocrine: Negative.    Genitourinary: Positive for frequency.        Increased urinary frequency on hctz. occ leakage.    Musculoskeletal: Positive for arthralgias.   Skin: Negative.    Allergic/Immunologic: Negative.    Neurological: Negative.    Hematological: Negative.    Psychiatric/Behavioral: Negative.        Objective   Physical Exam   Constitutional: He is oriented to person, place, and time. He appears well-developed and well-nourished.   HENT:   Head: Normocephalic and atraumatic.   Right Ear: External ear normal.   Left Ear: External ear normal.   Nose: Nose normal.   Mouth/Throat: Oropharynx is clear and moist.   Eyes: EOM are normal. Pupils are equal, round, and reactive to light.   Neck: Neck supple.   Cardiovascular: Normal rate, regular rhythm and normal heart sounds.    Pulmonary/Chest: Effort normal and breath sounds normal. No respiratory distress.   Abdominal: Soft.   Musculoskeletal: Normal range of motion.   Neurological: He is alert and oriented to person, place, and time.   Skin: Skin is warm and dry.   Psychiatric: He has a normal mood and affect. His behavior is normal. Judgment and thought content normal.   Nursing note and vitals reviewed.       /82  Pulse 81  Wt 244 lb (111 kg)  SpO2 98%  BMI 33.56 kg/m2    Assessment/Plan   Diagnoses and all orders for this visit:    Benign essential hypertension    Arteriosclerosis of coronary artery    OAB  (overactive bladder)        Patient w/ hypertension. bp is now normotensive w/ hctz. Offered to increase diovan to 320 and see if this will regulate bp off hctz given increased nocturia and leakage. Patient is going to discuss oab/ bph with his urologist next week and then decide if hctz is too challenging to take due to urine symtpoms. Will remain on diovan 160 mg in am and monitor bp. Low salt diet. Increase fitness as tolerated. Follow up here in dec as scheduled

## 2017-10-25 ENCOUNTER — OFFICE VISIT (OUTPATIENT)
Dept: CARDIAC REHAB | Facility: HOSPITAL | Age: 82
End: 2017-10-25

## 2017-10-25 DIAGNOSIS — Z95.5 HISTORY OF PLACEMENT OF STENT IN LAD CORONARY ARTERY: Primary | ICD-10-CM

## 2017-10-27 ENCOUNTER — OFFICE VISIT (OUTPATIENT)
Dept: CARDIAC REHAB | Facility: HOSPITAL | Age: 82
End: 2017-10-27

## 2017-10-27 DIAGNOSIS — Z95.5 HISTORY OF PLACEMENT OF STENT IN LAD CORONARY ARTERY: Primary | ICD-10-CM

## 2017-10-30 ENCOUNTER — OFFICE VISIT (OUTPATIENT)
Dept: CARDIAC REHAB | Facility: HOSPITAL | Age: 82
End: 2017-10-30

## 2017-10-30 DIAGNOSIS — Z95.5 HISTORY OF PLACEMENT OF STENT IN LAD CORONARY ARTERY: Primary | ICD-10-CM

## 2017-11-01 ENCOUNTER — OFFICE VISIT (OUTPATIENT)
Dept: CARDIAC REHAB | Facility: HOSPITAL | Age: 82
End: 2017-11-01

## 2017-11-01 DIAGNOSIS — I10 ESSENTIAL HYPERTENSION: Primary | ICD-10-CM

## 2017-11-01 DIAGNOSIS — Z95.5 HISTORY OF PLACEMENT OF STENT IN LAD CORONARY ARTERY: Primary | ICD-10-CM

## 2017-11-01 LAB
ALBUMIN SERPL-MCNC: 4 G/DL (ref 3.5–5.2)
ALBUMIN/GLOB SERPL: 1.5 G/DL
ALP SERPL-CCNC: 66 U/L (ref 39–117)
ALT SERPL-CCNC: 22 U/L (ref 1–41)
AST SERPL-CCNC: 18 U/L (ref 1–40)
BILIRUB SERPL-MCNC: 0.6 MG/DL (ref 0.1–1.2)
BUN SERPL-MCNC: 17 MG/DL (ref 8–23)
BUN/CREAT SERPL: 19.3 (ref 7–25)
CALCIUM SERPL-MCNC: 9.5 MG/DL (ref 8.6–10.5)
CHLORIDE SERPL-SCNC: 94 MMOL/L (ref 98–107)
CO2 SERPL-SCNC: 29 MMOL/L (ref 22–29)
CREAT SERPL-MCNC: 0.88 MG/DL (ref 0.76–1.27)
GFR SERPLBLD CREATININE-BSD FMLA CKD-EPI: 100 ML/MIN/1.73
GFR SERPLBLD CREATININE-BSD FMLA CKD-EPI: 83 ML/MIN/1.73
GLOBULIN SER CALC-MCNC: 2.7 GM/DL
GLUCOSE SERPL-MCNC: 91 MG/DL (ref 65–99)
POTASSIUM SERPL-SCNC: 4.7 MMOL/L (ref 3.5–5.2)
PROT SERPL-MCNC: 6.7 G/DL (ref 6–8.5)
SODIUM SERPL-SCNC: 134 MMOL/L (ref 136–145)

## 2017-11-08 ENCOUNTER — OFFICE VISIT (OUTPATIENT)
Dept: CARDIAC REHAB | Facility: HOSPITAL | Age: 82
End: 2017-11-08

## 2017-11-08 DIAGNOSIS — Z95.5 HISTORY OF PLACEMENT OF STENT IN LAD CORONARY ARTERY: Primary | ICD-10-CM

## 2017-11-10 ENCOUNTER — OFFICE VISIT (OUTPATIENT)
Dept: CARDIAC REHAB | Facility: HOSPITAL | Age: 82
End: 2017-11-10

## 2017-11-10 DIAGNOSIS — Z95.5 HISTORY OF PLACEMENT OF STENT IN LAD CORONARY ARTERY: Primary | ICD-10-CM

## 2017-11-15 ENCOUNTER — OFFICE VISIT (OUTPATIENT)
Dept: CARDIAC REHAB | Facility: HOSPITAL | Age: 82
End: 2017-11-15

## 2017-11-15 DIAGNOSIS — Z95.5 HISTORY OF PLACEMENT OF STENT IN LAD CORONARY ARTERY: Primary | ICD-10-CM

## 2017-11-17 ENCOUNTER — OFFICE VISIT (OUTPATIENT)
Dept: CARDIAC REHAB | Facility: HOSPITAL | Age: 82
End: 2017-11-17

## 2017-11-17 DIAGNOSIS — Z95.5 HISTORY OF PLACEMENT OF STENT IN LAD CORONARY ARTERY: Primary | ICD-10-CM

## 2017-11-20 ENCOUNTER — OFFICE VISIT (OUTPATIENT)
Dept: CARDIAC REHAB | Facility: HOSPITAL | Age: 82
End: 2017-11-20

## 2017-11-20 DIAGNOSIS — Z95.5 HISTORY OF PLACEMENT OF STENT IN LAD CORONARY ARTERY: Primary | ICD-10-CM

## 2017-11-22 ENCOUNTER — OFFICE VISIT (OUTPATIENT)
Dept: CARDIAC REHAB | Facility: HOSPITAL | Age: 82
End: 2017-11-22

## 2017-11-22 DIAGNOSIS — Z95.5 HISTORY OF PLACEMENT OF STENT IN LAD CORONARY ARTERY: Primary | ICD-10-CM

## 2017-11-27 ENCOUNTER — OFFICE VISIT (OUTPATIENT)
Dept: CARDIAC REHAB | Facility: HOSPITAL | Age: 82
End: 2017-11-27

## 2017-11-27 DIAGNOSIS — Z95.5 HISTORY OF PLACEMENT OF STENT IN LAD CORONARY ARTERY: Primary | ICD-10-CM

## 2017-11-29 ENCOUNTER — OFFICE VISIT (OUTPATIENT)
Dept: CARDIAC REHAB | Facility: HOSPITAL | Age: 82
End: 2017-11-29

## 2017-11-29 DIAGNOSIS — Z95.5 HISTORY OF PLACEMENT OF STENT IN LAD CORONARY ARTERY: Primary | ICD-10-CM

## 2017-12-01 ENCOUNTER — OFFICE VISIT (OUTPATIENT)
Dept: CARDIAC REHAB | Facility: HOSPITAL | Age: 82
End: 2017-12-01

## 2017-12-01 DIAGNOSIS — Z95.5 HISTORY OF PLACEMENT OF STENT IN LAD CORONARY ARTERY: Primary | ICD-10-CM

## 2017-12-04 ENCOUNTER — OFFICE VISIT (OUTPATIENT)
Dept: CARDIAC REHAB | Facility: HOSPITAL | Age: 82
End: 2017-12-04

## 2017-12-04 DIAGNOSIS — Z95.5 HISTORY OF PLACEMENT OF STENT IN LAD CORONARY ARTERY: Primary | ICD-10-CM

## 2017-12-05 ENCOUNTER — OFFICE VISIT (OUTPATIENT)
Dept: INTERNAL MEDICINE | Facility: CLINIC | Age: 82
End: 2017-12-05

## 2017-12-05 VITALS
WEIGHT: 245 LBS | SYSTOLIC BLOOD PRESSURE: 140 MMHG | HEART RATE: 89 BPM | DIASTOLIC BLOOD PRESSURE: 84 MMHG | OXYGEN SATURATION: 97 % | BODY MASS INDEX: 33.69 KG/M2

## 2017-12-05 DIAGNOSIS — E78.00 PURE HYPERCHOLESTEROLEMIA: ICD-10-CM

## 2017-12-05 DIAGNOSIS — I10 BENIGN ESSENTIAL HYPERTENSION: Primary | ICD-10-CM

## 2017-12-05 DIAGNOSIS — I25.10 ARTERIOSCLEROSIS OF CORONARY ARTERY: ICD-10-CM

## 2017-12-05 PROCEDURE — 99214 OFFICE O/P EST MOD 30 MIN: CPT | Performed by: INTERNAL MEDICINE

## 2017-12-05 NOTE — PROGRESS NOTES
Chief Complaint   Patient presents with   • Hypertension     Htn, hyperlipidemia    History of Present Illness   Scar Pérez is a 83 y.o. male presents for follow up evaluation. In general he is doing well. He has a history of hypertension. bp has been monitored at cardiac rehab. Levels are low/ normal there. Running 140s at home and here. Taking diovan and hctz w/ good benefit. Reports that he has been getting knee pain if walks on the treadmill. Using newstep at cardiac rehab and he tolerates this well several days a week. Has hyperlipidemia w/ lipids at goal level on lipitor.     The following portions of the patient's history were reviewed and updated as appropriate: allergies, current medications, past family history, past medical history, past social history, past surgical history and problem list.  Current Outpatient Prescriptions on File Prior to Visit   Medication Sig Dispense Refill   • aspirin 325 MG tablet Take 1 tablet by mouth daily.     • Cholecalciferol (VITAMIN D3) 5000 UNITS tablet Take by mouth.     • Cyanocobalamin (B-12) 1000 MCG capsule Take 1 capsule by mouth.     • fluocinolone (SYNALAR) 0.01 % external solution Apply topically. APPLY TO AFFECTED AREA AS DIRECTED     • hydrochlorothiazide (MICROZIDE) 12.5 MG capsule Take 1 capsule by mouth Daily. 30 capsule 5   • hydrocortisone (ANUSOL-HC) 25 MG suppository Hydrocortisone Acetate 25 MG Rectal Suppository; Patient Sig: Hydrocortisone Acetate 25 MG Rectal Suppository INSERT ONE SUPPOSITORY RECTALLY TWICE DAILY AS NEEDED; 12; 4; 19-Nov-2013; Active     • metoprolol tartrate (LOPRESSOR) 100 MG tablet Take 1 tablet by mouth 2 (two) times a day.     • niacin 500 MG tablet Take 1 tablet by mouth daily.     • nitroglycerin (NITROSTAT) 0.4 MG SL tablet Place 0.4 mg under the tongue.     • pantoprazole (PROTONIX) 40 MG EC tablet Take 1 tablet by mouth daily.     • trospium (SANCTURA) 20 MG tablet Take 20 mg by mouth.     • valsartan (DIOVAN) 160  MG tablet Take 1 tablet by mouth Daily. 90 tablet 2   • atorvastatin (LIPITOR) 20 MG tablet Take 20 mg by mouth daily.       No current facility-administered medications on file prior to visit.      Review of Systems   Constitutional: Negative.    HENT: Negative.    Eyes: Negative.    Respiratory: Negative.    Cardiovascular: Negative.    Endocrine: Negative.    Genitourinary: Negative.    Musculoskeletal: Negative.    Skin: Negative.    Allergic/Immunologic: Negative.    Neurological: Negative.    Hematological: Negative.    Psychiatric/Behavioral: Negative.        Objective   Physical Exam   Constitutional: He is oriented to person, place, and time. He appears well-developed and well-nourished.   HENT:   Head: Normocephalic and atraumatic.   Right Ear: External ear normal.   Left Ear: External ear normal.   Nose: Nose normal.   Mouth/Throat: Oropharynx is clear and moist.   Eyes: EOM are normal. Pupils are equal, round, and reactive to light.   Neck: Neck supple.   Cardiovascular: Normal rate, regular rhythm and normal heart sounds.    Pulmonary/Chest: Effort normal and breath sounds normal. No respiratory distress.   Abdominal: Soft. Bowel sounds are normal.   Musculoskeletal: Normal range of motion.   Neurological: He is alert and oriented to person, place, and time.   Skin: Skin is warm and dry.   Psychiatric: He has a normal mood and affect. His behavior is normal. Judgment and thought content normal.   Nursing note and vitals reviewed.       /84  Pulse 89  Wt 111 kg (245 lb)  SpO2 97%  BMI 33.69 kg/m2    Assessment/Plan   Diagnoses and all orders for this visit:    Benign essential hypertension    Pure hypercholesterolemia    Patient w/ hypertension. He will continue diovan and hctz at this time. bp is at goal level for age. Patients monitor testing higher than rehab or office. He will have this calibrated. Low sodium diet. He will continue lipitor and a low fat diet for lipid control. Continue  cardiac rehab/ fitness as tolerated by knee pain. He will f/u in 6-8 months or prn.

## 2017-12-06 ENCOUNTER — OFFICE VISIT (OUTPATIENT)
Dept: CARDIAC REHAB | Facility: HOSPITAL | Age: 82
End: 2017-12-06

## 2017-12-06 DIAGNOSIS — Z95.5 HISTORY OF PLACEMENT OF STENT IN LAD CORONARY ARTERY: Primary | ICD-10-CM

## 2017-12-08 ENCOUNTER — OFFICE VISIT (OUTPATIENT)
Dept: CARDIAC REHAB | Facility: HOSPITAL | Age: 82
End: 2017-12-08

## 2017-12-08 DIAGNOSIS — Z95.5 HISTORY OF PLACEMENT OF STENT IN LAD CORONARY ARTERY: Primary | ICD-10-CM

## 2017-12-11 ENCOUNTER — OFFICE VISIT (OUTPATIENT)
Dept: CARDIAC REHAB | Facility: HOSPITAL | Age: 82
End: 2017-12-11

## 2017-12-11 DIAGNOSIS — Z95.5 HISTORY OF PLACEMENT OF STENT IN LAD CORONARY ARTERY: Primary | ICD-10-CM

## 2017-12-13 ENCOUNTER — OFFICE VISIT (OUTPATIENT)
Dept: CARDIAC REHAB | Facility: HOSPITAL | Age: 82
End: 2017-12-13

## 2017-12-13 DIAGNOSIS — Z95.5 HISTORY OF PLACEMENT OF STENT IN LAD CORONARY ARTERY: Primary | ICD-10-CM

## 2017-12-15 ENCOUNTER — OFFICE VISIT (OUTPATIENT)
Dept: CARDIAC REHAB | Facility: HOSPITAL | Age: 82
End: 2017-12-15

## 2017-12-15 DIAGNOSIS — Z95.5 HISTORY OF PLACEMENT OF STENT IN LAD CORONARY ARTERY: Primary | ICD-10-CM

## 2017-12-18 ENCOUNTER — OFFICE VISIT (OUTPATIENT)
Dept: CARDIAC REHAB | Facility: HOSPITAL | Age: 82
End: 2017-12-18

## 2017-12-18 DIAGNOSIS — Z95.5 HISTORY OF PLACEMENT OF STENT IN LAD CORONARY ARTERY: Primary | ICD-10-CM

## 2017-12-20 ENCOUNTER — OFFICE VISIT (OUTPATIENT)
Dept: CARDIAC REHAB | Facility: HOSPITAL | Age: 82
End: 2017-12-20

## 2017-12-20 DIAGNOSIS — Z95.5 HISTORY OF PLACEMENT OF STENT IN LAD CORONARY ARTERY: Primary | ICD-10-CM

## 2017-12-22 ENCOUNTER — OFFICE VISIT (OUTPATIENT)
Dept: CARDIAC REHAB | Facility: HOSPITAL | Age: 82
End: 2017-12-22

## 2017-12-22 DIAGNOSIS — Z95.5 HISTORY OF PLACEMENT OF STENT IN LAD CORONARY ARTERY: Primary | ICD-10-CM

## 2017-12-27 ENCOUNTER — OFFICE VISIT (OUTPATIENT)
Dept: CARDIAC REHAB | Facility: HOSPITAL | Age: 82
End: 2017-12-27

## 2017-12-27 DIAGNOSIS — Z95.5 HISTORY OF PLACEMENT OF STENT IN LAD CORONARY ARTERY: Primary | ICD-10-CM

## 2018-01-05 ENCOUNTER — TRANSCRIBE ORDERS (OUTPATIENT)
Dept: CARDIAC REHAB | Facility: HOSPITAL | Age: 83
End: 2018-01-05

## 2018-01-05 DIAGNOSIS — Z95.5 HISTORY OF CORONARY ARTERY STENT PLACEMENT: Primary | ICD-10-CM

## 2018-01-08 ENCOUNTER — OFFICE VISIT (OUTPATIENT)
Dept: CARDIAC REHAB | Facility: HOSPITAL | Age: 83
End: 2018-01-08

## 2018-01-08 DIAGNOSIS — Z95.5 HISTORY OF PLACEMENT OF STENT IN LAD CORONARY ARTERY: Primary | ICD-10-CM

## 2018-01-10 ENCOUNTER — OFFICE VISIT (OUTPATIENT)
Dept: CARDIAC REHAB | Facility: HOSPITAL | Age: 83
End: 2018-01-10

## 2018-01-10 DIAGNOSIS — Z95.5 HISTORY OF PLACEMENT OF STENT IN LAD CORONARY ARTERY: Primary | ICD-10-CM

## 2018-01-15 ENCOUNTER — OFFICE VISIT (OUTPATIENT)
Dept: CARDIAC REHAB | Facility: HOSPITAL | Age: 83
End: 2018-01-15

## 2018-01-15 DIAGNOSIS — Z95.5 HISTORY OF PLACEMENT OF STENT IN LAD CORONARY ARTERY: Primary | ICD-10-CM

## 2018-01-19 ENCOUNTER — OFFICE VISIT (OUTPATIENT)
Dept: CARDIAC REHAB | Facility: HOSPITAL | Age: 83
End: 2018-01-19

## 2018-01-19 DIAGNOSIS — Z95.5 HISTORY OF PLACEMENT OF STENT IN LAD CORONARY ARTERY: Primary | ICD-10-CM

## 2018-01-22 ENCOUNTER — OFFICE VISIT (OUTPATIENT)
Dept: CARDIAC REHAB | Facility: HOSPITAL | Age: 83
End: 2018-01-22

## 2018-01-22 DIAGNOSIS — Z95.5 HISTORY OF PLACEMENT OF STENT IN LAD CORONARY ARTERY: Primary | ICD-10-CM

## 2018-01-24 ENCOUNTER — OFFICE VISIT (OUTPATIENT)
Dept: CARDIAC REHAB | Facility: HOSPITAL | Age: 83
End: 2018-01-24

## 2018-01-24 DIAGNOSIS — Z95.5 HISTORY OF PLACEMENT OF STENT IN LAD CORONARY ARTERY: Primary | ICD-10-CM

## 2018-01-29 ENCOUNTER — OFFICE VISIT (OUTPATIENT)
Dept: CARDIAC REHAB | Facility: HOSPITAL | Age: 83
End: 2018-01-29

## 2018-01-29 DIAGNOSIS — Z95.5 HISTORY OF PLACEMENT OF STENT IN LAD CORONARY ARTERY: Primary | ICD-10-CM

## 2018-01-31 ENCOUNTER — OFFICE VISIT (OUTPATIENT)
Dept: CARDIAC REHAB | Facility: HOSPITAL | Age: 83
End: 2018-01-31

## 2018-01-31 DIAGNOSIS — Z95.5 HISTORY OF PLACEMENT OF STENT IN LAD CORONARY ARTERY: Primary | ICD-10-CM

## 2018-02-02 ENCOUNTER — OFFICE VISIT (OUTPATIENT)
Dept: CARDIAC REHAB | Facility: HOSPITAL | Age: 83
End: 2018-02-02

## 2018-02-02 DIAGNOSIS — Z95.5 HISTORY OF PLACEMENT OF STENT IN LAD CORONARY ARTERY: Primary | ICD-10-CM

## 2018-02-05 ENCOUNTER — OFFICE VISIT (OUTPATIENT)
Dept: CARDIAC REHAB | Facility: HOSPITAL | Age: 83
End: 2018-02-05

## 2018-02-05 DIAGNOSIS — Z95.5 HISTORY OF PLACEMENT OF STENT IN LAD CORONARY ARTERY: Primary | ICD-10-CM

## 2018-02-07 ENCOUNTER — OFFICE VISIT (OUTPATIENT)
Dept: CARDIAC REHAB | Facility: HOSPITAL | Age: 83
End: 2018-02-07

## 2018-02-07 DIAGNOSIS — Z95.5 HISTORY OF PLACEMENT OF STENT IN LAD CORONARY ARTERY: Primary | ICD-10-CM

## 2018-03-07 ENCOUNTER — OFFICE VISIT (OUTPATIENT)
Dept: CARDIAC REHAB | Facility: HOSPITAL | Age: 83
End: 2018-03-07

## 2018-03-07 DIAGNOSIS — Z95.5 HISTORY OF PLACEMENT OF STENT IN LAD CORONARY ARTERY: Primary | ICD-10-CM

## 2018-03-12 ENCOUNTER — OFFICE VISIT (OUTPATIENT)
Dept: CARDIAC REHAB | Facility: HOSPITAL | Age: 83
End: 2018-03-12

## 2018-03-12 DIAGNOSIS — Z95.5 HISTORY OF PLACEMENT OF STENT IN LAD CORONARY ARTERY: Primary | ICD-10-CM

## 2018-03-14 ENCOUNTER — OFFICE VISIT (OUTPATIENT)
Dept: CARDIAC REHAB | Facility: HOSPITAL | Age: 83
End: 2018-03-14

## 2018-03-14 DIAGNOSIS — Z95.5 HISTORY OF PLACEMENT OF STENT IN LAD CORONARY ARTERY: Primary | ICD-10-CM

## 2018-03-16 ENCOUNTER — OFFICE VISIT (OUTPATIENT)
Dept: CARDIAC REHAB | Facility: HOSPITAL | Age: 83
End: 2018-03-16

## 2018-03-16 DIAGNOSIS — Z95.5 HISTORY OF PLACEMENT OF STENT IN LAD CORONARY ARTERY: Primary | ICD-10-CM

## 2018-03-19 ENCOUNTER — OFFICE VISIT (OUTPATIENT)
Dept: CARDIAC REHAB | Facility: HOSPITAL | Age: 83
End: 2018-03-19

## 2018-03-19 DIAGNOSIS — Z95.5 HISTORY OF PLACEMENT OF STENT IN LAD CORONARY ARTERY: Primary | ICD-10-CM

## 2018-03-23 ENCOUNTER — OFFICE VISIT (OUTPATIENT)
Dept: CARDIAC REHAB | Facility: HOSPITAL | Age: 83
End: 2018-03-23

## 2018-03-23 DIAGNOSIS — Z95.5 HISTORY OF PLACEMENT OF STENT IN LAD CORONARY ARTERY: Primary | ICD-10-CM

## 2018-03-26 ENCOUNTER — OFFICE VISIT (OUTPATIENT)
Dept: CARDIAC REHAB | Facility: HOSPITAL | Age: 83
End: 2018-03-26

## 2018-03-26 DIAGNOSIS — Z95.5 HISTORY OF PLACEMENT OF STENT IN LAD CORONARY ARTERY: Primary | ICD-10-CM

## 2018-03-28 ENCOUNTER — OFFICE VISIT (OUTPATIENT)
Dept: CARDIAC REHAB | Facility: HOSPITAL | Age: 83
End: 2018-03-28

## 2018-03-28 DIAGNOSIS — Z95.5 HISTORY OF PLACEMENT OF STENT IN LAD CORONARY ARTERY: Primary | ICD-10-CM

## 2018-03-30 ENCOUNTER — OFFICE VISIT (OUTPATIENT)
Dept: CARDIAC REHAB | Facility: HOSPITAL | Age: 83
End: 2018-03-30

## 2018-03-30 DIAGNOSIS — Z95.5 HISTORY OF PLACEMENT OF STENT IN LAD CORONARY ARTERY: Primary | ICD-10-CM

## 2018-04-02 ENCOUNTER — OFFICE VISIT (OUTPATIENT)
Dept: CARDIAC REHAB | Facility: HOSPITAL | Age: 83
End: 2018-04-02

## 2018-04-02 DIAGNOSIS — Z95.5 HISTORY OF PLACEMENT OF STENT IN LAD CORONARY ARTERY: Primary | ICD-10-CM

## 2018-04-04 ENCOUNTER — OFFICE VISIT (OUTPATIENT)
Dept: CARDIAC REHAB | Facility: HOSPITAL | Age: 83
End: 2018-04-04

## 2018-04-04 DIAGNOSIS — Z95.5 HISTORY OF PLACEMENT OF STENT IN LAD CORONARY ARTERY: Primary | ICD-10-CM

## 2018-04-06 ENCOUNTER — OFFICE VISIT (OUTPATIENT)
Dept: CARDIAC REHAB | Facility: HOSPITAL | Age: 83
End: 2018-04-06

## 2018-04-06 DIAGNOSIS — Z95.5 HISTORY OF PLACEMENT OF STENT IN LAD CORONARY ARTERY: Primary | ICD-10-CM

## 2018-04-09 ENCOUNTER — OFFICE VISIT (OUTPATIENT)
Dept: CARDIAC REHAB | Facility: HOSPITAL | Age: 83
End: 2018-04-09

## 2018-04-09 DIAGNOSIS — Z95.5 HISTORY OF PLACEMENT OF STENT IN LAD CORONARY ARTERY: Primary | ICD-10-CM

## 2018-04-11 ENCOUNTER — OFFICE VISIT (OUTPATIENT)
Dept: CARDIAC REHAB | Facility: HOSPITAL | Age: 83
End: 2018-04-11

## 2018-04-11 DIAGNOSIS — Z95.5 HISTORY OF PLACEMENT OF STENT IN LAD CORONARY ARTERY: Primary | ICD-10-CM

## 2018-04-13 ENCOUNTER — OFFICE VISIT (OUTPATIENT)
Dept: CARDIAC REHAB | Facility: HOSPITAL | Age: 83
End: 2018-04-13

## 2018-04-13 DIAGNOSIS — Z95.5 HISTORY OF PLACEMENT OF STENT IN LAD CORONARY ARTERY: Primary | ICD-10-CM

## 2018-04-16 ENCOUNTER — OFFICE VISIT (OUTPATIENT)
Dept: CARDIAC REHAB | Facility: HOSPITAL | Age: 83
End: 2018-04-16

## 2018-04-16 DIAGNOSIS — Z95.5 HISTORY OF PLACEMENT OF STENT IN LAD CORONARY ARTERY: Primary | ICD-10-CM

## 2018-04-18 ENCOUNTER — OFFICE VISIT (OUTPATIENT)
Dept: CARDIAC REHAB | Facility: HOSPITAL | Age: 83
End: 2018-04-18

## 2018-04-18 DIAGNOSIS — Z95.5 HISTORY OF PLACEMENT OF STENT IN LAD CORONARY ARTERY: Primary | ICD-10-CM

## 2018-04-20 ENCOUNTER — OFFICE VISIT (OUTPATIENT)
Dept: CARDIAC REHAB | Facility: HOSPITAL | Age: 83
End: 2018-04-20

## 2018-04-20 DIAGNOSIS — Z95.5 HISTORY OF PLACEMENT OF STENT IN LAD CORONARY ARTERY: Primary | ICD-10-CM

## 2018-04-23 ENCOUNTER — OFFICE VISIT (OUTPATIENT)
Dept: CARDIAC REHAB | Facility: HOSPITAL | Age: 83
End: 2018-04-23

## 2018-04-23 DIAGNOSIS — Z95.5 HISTORY OF PLACEMENT OF STENT IN LAD CORONARY ARTERY: Primary | ICD-10-CM

## 2018-04-25 ENCOUNTER — OFFICE VISIT (OUTPATIENT)
Dept: CARDIAC REHAB | Facility: HOSPITAL | Age: 83
End: 2018-04-25

## 2018-04-25 DIAGNOSIS — Z95.5 HISTORY OF PLACEMENT OF STENT IN LAD CORONARY ARTERY: Primary | ICD-10-CM

## 2018-05-02 ENCOUNTER — OFFICE VISIT (OUTPATIENT)
Dept: CARDIAC REHAB | Facility: HOSPITAL | Age: 83
End: 2018-05-02

## 2018-05-02 DIAGNOSIS — Z95.5 HISTORY OF PLACEMENT OF STENT IN LAD CORONARY ARTERY: Primary | ICD-10-CM

## 2018-05-04 ENCOUNTER — OFFICE VISIT (OUTPATIENT)
Dept: CARDIAC REHAB | Facility: HOSPITAL | Age: 83
End: 2018-05-04

## 2018-05-04 DIAGNOSIS — Z95.5 HISTORY OF PLACEMENT OF STENT IN LAD CORONARY ARTERY: Primary | ICD-10-CM

## 2018-05-07 ENCOUNTER — OFFICE VISIT (OUTPATIENT)
Dept: CARDIAC REHAB | Facility: HOSPITAL | Age: 83
End: 2018-05-07

## 2018-05-07 DIAGNOSIS — Z95.5 HISTORY OF PLACEMENT OF STENT IN LAD CORONARY ARTERY: Primary | ICD-10-CM

## 2018-05-09 ENCOUNTER — OFFICE VISIT (OUTPATIENT)
Dept: CARDIAC REHAB | Facility: HOSPITAL | Age: 83
End: 2018-05-09

## 2018-05-09 DIAGNOSIS — Z95.5 HISTORY OF PLACEMENT OF STENT IN LAD CORONARY ARTERY: Primary | ICD-10-CM

## 2018-05-11 ENCOUNTER — OFFICE VISIT (OUTPATIENT)
Dept: CARDIAC REHAB | Facility: HOSPITAL | Age: 83
End: 2018-05-11

## 2018-05-11 DIAGNOSIS — Z95.5 HISTORY OF PLACEMENT OF STENT IN LAD CORONARY ARTERY: Primary | ICD-10-CM

## 2018-05-14 ENCOUNTER — OFFICE VISIT (OUTPATIENT)
Dept: CARDIAC REHAB | Facility: HOSPITAL | Age: 83
End: 2018-05-14

## 2018-05-14 DIAGNOSIS — Z95.5 HISTORY OF PLACEMENT OF STENT IN LAD CORONARY ARTERY: Primary | ICD-10-CM

## 2018-05-16 ENCOUNTER — OFFICE VISIT (OUTPATIENT)
Dept: CARDIAC REHAB | Facility: HOSPITAL | Age: 83
End: 2018-05-16

## 2018-05-16 DIAGNOSIS — Z95.5 HISTORY OF PLACEMENT OF STENT IN LAD CORONARY ARTERY: Primary | ICD-10-CM

## 2018-05-18 ENCOUNTER — OFFICE VISIT (OUTPATIENT)
Dept: CARDIAC REHAB | Facility: HOSPITAL | Age: 83
End: 2018-05-18

## 2018-05-18 DIAGNOSIS — Z95.5 HISTORY OF PLACEMENT OF STENT IN LAD CORONARY ARTERY: Primary | ICD-10-CM

## 2018-05-21 ENCOUNTER — OFFICE VISIT (OUTPATIENT)
Dept: CARDIAC REHAB | Facility: HOSPITAL | Age: 83
End: 2018-05-21

## 2018-05-21 DIAGNOSIS — Z95.5 HISTORY OF PLACEMENT OF STENT IN LAD CORONARY ARTERY: Primary | ICD-10-CM

## 2018-05-23 ENCOUNTER — OFFICE VISIT (OUTPATIENT)
Dept: CARDIAC REHAB | Facility: HOSPITAL | Age: 83
End: 2018-05-23

## 2018-05-23 DIAGNOSIS — Z95.5 HISTORY OF PLACEMENT OF STENT IN LAD CORONARY ARTERY: Primary | ICD-10-CM

## 2018-05-25 ENCOUNTER — OFFICE VISIT (OUTPATIENT)
Dept: CARDIAC REHAB | Facility: HOSPITAL | Age: 83
End: 2018-05-25

## 2018-05-25 DIAGNOSIS — Z95.5 HISTORY OF PLACEMENT OF STENT IN LAD CORONARY ARTERY: Primary | ICD-10-CM

## 2018-05-30 ENCOUNTER — OFFICE VISIT (OUTPATIENT)
Dept: CARDIAC REHAB | Facility: HOSPITAL | Age: 83
End: 2018-05-30

## 2018-05-30 DIAGNOSIS — Z95.5 HISTORY OF PLACEMENT OF STENT IN LAD CORONARY ARTERY: Primary | ICD-10-CM

## 2018-06-01 ENCOUNTER — OFFICE VISIT (OUTPATIENT)
Dept: CARDIAC REHAB | Facility: HOSPITAL | Age: 83
End: 2018-06-01

## 2018-06-01 DIAGNOSIS — Z95.5 HISTORY OF PLACEMENT OF STENT IN LAD CORONARY ARTERY: Primary | ICD-10-CM

## 2018-06-04 ENCOUNTER — OFFICE VISIT (OUTPATIENT)
Dept: CARDIAC REHAB | Facility: HOSPITAL | Age: 83
End: 2018-06-04

## 2018-06-04 DIAGNOSIS — Z95.5 HISTORY OF PLACEMENT OF STENT IN LAD CORONARY ARTERY: Primary | ICD-10-CM

## 2018-06-06 ENCOUNTER — OFFICE VISIT (OUTPATIENT)
Dept: CARDIAC REHAB | Facility: HOSPITAL | Age: 83
End: 2018-06-06

## 2018-06-06 DIAGNOSIS — Z95.5 HISTORY OF PLACEMENT OF STENT IN LAD CORONARY ARTERY: Primary | ICD-10-CM

## 2018-06-11 ENCOUNTER — OFFICE VISIT (OUTPATIENT)
Dept: CARDIAC REHAB | Facility: HOSPITAL | Age: 83
End: 2018-06-11

## 2018-06-11 DIAGNOSIS — Z95.5 HISTORY OF PLACEMENT OF STENT IN LAD CORONARY ARTERY: Primary | ICD-10-CM

## 2018-06-13 ENCOUNTER — OFFICE VISIT (OUTPATIENT)
Dept: CARDIAC REHAB | Facility: HOSPITAL | Age: 83
End: 2018-06-13

## 2018-06-13 DIAGNOSIS — Z95.5 HISTORY OF PLACEMENT OF STENT IN LAD CORONARY ARTERY: Primary | ICD-10-CM

## 2018-06-15 ENCOUNTER — OFFICE VISIT (OUTPATIENT)
Dept: CARDIAC REHAB | Facility: HOSPITAL | Age: 83
End: 2018-06-15

## 2018-06-15 DIAGNOSIS — Z95.5 HISTORY OF PLACEMENT OF STENT IN LAD CORONARY ARTERY: Primary | ICD-10-CM

## 2018-06-18 ENCOUNTER — OFFICE VISIT (OUTPATIENT)
Dept: CARDIAC REHAB | Facility: HOSPITAL | Age: 83
End: 2018-06-18

## 2018-06-18 DIAGNOSIS — Z95.5 HISTORY OF PLACEMENT OF STENT IN LAD CORONARY ARTERY: Primary | ICD-10-CM

## 2018-06-20 ENCOUNTER — OFFICE VISIT (OUTPATIENT)
Dept: CARDIAC REHAB | Facility: HOSPITAL | Age: 83
End: 2018-06-20

## 2018-06-20 DIAGNOSIS — Z95.5 HISTORY OF PLACEMENT OF STENT IN LAD CORONARY ARTERY: Primary | ICD-10-CM

## 2018-06-22 ENCOUNTER — OFFICE VISIT (OUTPATIENT)
Dept: CARDIAC REHAB | Facility: HOSPITAL | Age: 83
End: 2018-06-22

## 2018-06-22 DIAGNOSIS — Z95.5 HISTORY OF PLACEMENT OF STENT IN LAD CORONARY ARTERY: Primary | ICD-10-CM

## 2018-06-25 ENCOUNTER — OFFICE VISIT (OUTPATIENT)
Dept: CARDIAC REHAB | Facility: HOSPITAL | Age: 83
End: 2018-06-25

## 2018-06-25 DIAGNOSIS — Z95.5 HISTORY OF PLACEMENT OF STENT IN LAD CORONARY ARTERY: Primary | ICD-10-CM

## 2018-06-27 ENCOUNTER — OFFICE VISIT (OUTPATIENT)
Dept: CARDIAC REHAB | Facility: HOSPITAL | Age: 83
End: 2018-06-27

## 2018-06-27 DIAGNOSIS — Z95.5 HISTORY OF PLACEMENT OF STENT IN LAD CORONARY ARTERY: Primary | ICD-10-CM

## 2018-06-29 ENCOUNTER — OFFICE VISIT (OUTPATIENT)
Dept: CARDIAC REHAB | Facility: HOSPITAL | Age: 83
End: 2018-06-29

## 2018-06-29 DIAGNOSIS — Z95.5 HISTORY OF PLACEMENT OF STENT IN LAD CORONARY ARTERY: Primary | ICD-10-CM

## 2018-07-02 ENCOUNTER — OFFICE VISIT (OUTPATIENT)
Dept: CARDIAC REHAB | Facility: HOSPITAL | Age: 83
End: 2018-07-02

## 2018-07-02 DIAGNOSIS — Z95.5 HISTORY OF PLACEMENT OF STENT IN LAD CORONARY ARTERY: Primary | ICD-10-CM

## 2018-07-06 ENCOUNTER — OFFICE VISIT (OUTPATIENT)
Dept: CARDIAC REHAB | Facility: HOSPITAL | Age: 83
End: 2018-07-06

## 2018-07-06 DIAGNOSIS — Z95.5 HISTORY OF PLACEMENT OF STENT IN LAD CORONARY ARTERY: Primary | ICD-10-CM

## 2018-07-09 ENCOUNTER — OFFICE VISIT (OUTPATIENT)
Dept: CARDIAC REHAB | Facility: HOSPITAL | Age: 83
End: 2018-07-09

## 2018-07-09 DIAGNOSIS — Z95.5 HISTORY OF PLACEMENT OF STENT IN LAD CORONARY ARTERY: Primary | ICD-10-CM

## 2018-07-11 ENCOUNTER — OFFICE VISIT (OUTPATIENT)
Dept: CARDIAC REHAB | Facility: HOSPITAL | Age: 83
End: 2018-07-11

## 2018-07-11 DIAGNOSIS — Z95.5 HISTORY OF PLACEMENT OF STENT IN LAD CORONARY ARTERY: Primary | ICD-10-CM

## 2018-07-16 ENCOUNTER — OFFICE VISIT (OUTPATIENT)
Dept: CARDIAC REHAB | Facility: HOSPITAL | Age: 83
End: 2018-07-16

## 2018-07-16 DIAGNOSIS — Z95.5 HISTORY OF PLACEMENT OF STENT IN LAD CORONARY ARTERY: Primary | ICD-10-CM

## 2018-07-18 ENCOUNTER — OFFICE VISIT (OUTPATIENT)
Dept: CARDIAC REHAB | Facility: HOSPITAL | Age: 83
End: 2018-07-18

## 2018-07-18 DIAGNOSIS — Z95.5 HISTORY OF PLACEMENT OF STENT IN LAD CORONARY ARTERY: Primary | ICD-10-CM

## 2018-07-20 ENCOUNTER — OFFICE VISIT (OUTPATIENT)
Dept: CARDIAC REHAB | Facility: HOSPITAL | Age: 83
End: 2018-07-20

## 2018-07-20 DIAGNOSIS — Z95.5 HISTORY OF PLACEMENT OF STENT IN LAD CORONARY ARTERY: Primary | ICD-10-CM

## 2018-07-23 ENCOUNTER — OFFICE VISIT (OUTPATIENT)
Dept: CARDIAC REHAB | Facility: HOSPITAL | Age: 83
End: 2018-07-23

## 2018-07-23 ENCOUNTER — OFFICE VISIT (OUTPATIENT)
Dept: INTERNAL MEDICINE | Facility: CLINIC | Age: 83
End: 2018-07-23

## 2018-07-23 VITALS
DIASTOLIC BLOOD PRESSURE: 74 MMHG | HEIGHT: 72 IN | OXYGEN SATURATION: 97 % | WEIGHT: 249 LBS | SYSTOLIC BLOOD PRESSURE: 124 MMHG | HEART RATE: 71 BPM | BODY MASS INDEX: 33.72 KG/M2

## 2018-07-23 DIAGNOSIS — I10 BENIGN ESSENTIAL HYPERTENSION: ICD-10-CM

## 2018-07-23 DIAGNOSIS — I48.20 CHRONIC ATRIAL FIBRILLATION (HCC): ICD-10-CM

## 2018-07-23 DIAGNOSIS — I25.10 ARTERIOSCLEROSIS OF CORONARY ARTERY: ICD-10-CM

## 2018-07-23 DIAGNOSIS — Z00.00 HEALTHCARE MAINTENANCE: Primary | ICD-10-CM

## 2018-07-23 DIAGNOSIS — E78.00 PURE HYPERCHOLESTEROLEMIA: ICD-10-CM

## 2018-07-23 DIAGNOSIS — Z95.5 HISTORY OF PLACEMENT OF STENT IN LAD CORONARY ARTERY: Primary | ICD-10-CM

## 2018-07-23 PROCEDURE — 99214 OFFICE O/P EST MOD 30 MIN: CPT | Performed by: INTERNAL MEDICINE

## 2018-07-23 PROCEDURE — G0439 PPPS, SUBSEQ VISIT: HCPCS | Performed by: INTERNAL MEDICINE

## 2018-07-23 NOTE — PROGRESS NOTES
"Subjective   AWV  htn  CAD  Hyperlipidemia  Right ankle pain  Peripheral edema  Veinous insufficiency    Scar Pérez is a 84 y.o. male who presents for an annual wellness visit, review of chronic issues, and to discuss new complaints. Patient w/ recent right ankle injury. Woke with pain. Went to ortho and was given a boot to wear. He is now able to weight bear again. Gradual improvement. He does get some swelling in the leg during the day that improves when he elevates and ice. Wearing compression stockings mon, wed, fri. He is tolerating cardiac rehab on those days. Breathing is stable \"overall\". No chest pain. bp has been normotensive and he brings readings of sbp 110-120s/ 60-70s. He does not feel hypotensive with this. Has J CARLOS and is using mask every night. Has a history of atrial fibrillation. Now in sinus. And is taking full dose asa daily. Rate has been 70s-80s.         Review of Systems   Constitutional: Negative.    HENT: Negative.    Eyes: Negative.    Respiratory: Negative.    Cardiovascular: Negative.    Gastrointestinal: Negative.    Endocrine: Negative.    Genitourinary: Negative.         Urinary frequency and hesitency   Musculoskeletal: Positive for arthralgias.        Ankle and neck pain   Skin: Negative.    Allergic/Immunologic: Negative.    Neurological: Negative.    Hematological: Negative.    Psychiatric/Behavioral: Negative.        The following portions of the patient's history were reviewed and updated as appropriate: allergies, current medications, past family history, past medical history, past social history, past surgical history and problem list.     Patient Active Problem List   Diagnosis   • Absolute anemia   • A-fib (CMS/HCC)   • Arteriosclerosis of coronary artery   • Bleeding gastrointestinal   • Lipoma of skin and subcutaneous tissue   • Obstructive apnea   • Hyperkalemia   • Benign essential hypertension   • Pure hypercholesterolemia   • Impaired fasting glucose       Past " Medical History:   Diagnosis Date   • Abdominal aortic aneurysm (CMS/HCC)    • Benign essential hypertension    • Coronary atherosclerosis    • Esophageal reflux    • Hyperglycemia    • Hyperlipidemia     '   • Sick sinus syndrome (CMS/HCC)        Past Surgical History:   Procedure Laterality Date   • CARDIAC PACEMAKER PLACEMENT     • CARDIAC PACEMAKER PLACEMENT     • CORONARY ANGIOPLASTY     • CORONARY ANGIOPLASTY WITH STENT PLACEMENT     • OTHER SURGICAL HISTORY      CATARACT SURGERY   • OTHER SURGICAL HISTORY      ROTATOR CUFF REPAIR        Family History   Problem Relation Age of Onset   • Leukemia Father    • Heart disease Maternal Aunt    • Diabetes Daughter        Social History     Social History   • Marital status:      Spouse name: N/A   • Number of children: N/A   • Years of education: N/A     Occupational History   • Not on file.     Social History Main Topics   • Smoking status: Never Smoker   • Smokeless tobacco: Never Used   • Alcohol use Yes      Comment: BEING A SOCIAL DRINKER   • Drug use: No   • Sexual activity: Not on file     Other Topics Concern   • Not on file     Social History Narrative   • No narrative on file       Current Outpatient Prescriptions on File Prior to Visit   Medication Sig Dispense Refill   • aspirin 325 MG tablet Take 1 tablet by mouth daily.     • atorvastatin (LIPITOR) 20 MG tablet Take 20 mg by mouth daily.     • Cholecalciferol (VITAMIN D3) 5000 UNITS tablet Take by mouth.     • Cyanocobalamin (B-12) 1000 MCG capsule Take 1 capsule by mouth.     • fluocinolone (SYNALAR) 0.01 % external solution Apply topically. APPLY TO AFFECTED AREA AS DIRECTED     • hydrochlorothiazide (MICROZIDE) 12.5 MG capsule Take 1 capsule by mouth Daily. 30 capsule 5   • hydrocortisone (ANUSOL-HC) 25 MG suppository Hydrocortisone Acetate 25 MG Rectal Suppository; Patient Sig: Hydrocortisone Acetate 25 MG Rectal Suppository INSERT ONE SUPPOSITORY RECTALLY TWICE DAILY AS NEEDED; 12; 4;  "19-Nov-2013; Active     • metoprolol tartrate (LOPRESSOR) 100 MG tablet Take 1 tablet by mouth 2 (two) times a day.     • niacin 500 MG tablet Take 1 tablet by mouth daily.     • nitroglycerin (NITROSTAT) 0.4 MG SL tablet Place 0.4 mg under the tongue.     • pantoprazole (PROTONIX) 40 MG EC tablet Take 1 tablet by mouth daily.     • trospium (SANCTURA) 20 MG tablet Take 20 mg by mouth.     • valsartan (DIOVAN) 160 MG tablet Take 1 tablet by mouth Daily. 90 tablet 2     No current facility-administered medications on file prior to visit.        Allergies   Allergen Reactions   • Iodine        Immunization History   Administered Date(s) Administered   • Influenza TIV (IM) 09/11/2015   • Influenza, Unspecified 01/01/2010, 09/30/2013, 09/25/2014   • Pneumococcal Conjugate 13-Valent (PCV13) 03/09/2015   • Pneumococcal Polysaccharide (PPSV23) 10/03/2007, 09/30/2013   • Tdap 10/03/2007       Objective     /74   Pulse 71   Ht 182.9 cm (72\")   Wt 113 kg (249 lb)   SpO2 97%   BMI 33.77 kg/m²     Physical Exam   Constitutional: He is oriented to person, place, and time. He appears well-developed and well-nourished.   HENT:   Head: Normocephalic and atraumatic.   Right Ear: External ear normal.   Left Ear: External ear normal.   Nose: Nose normal.   Mouth/Throat: Oropharynx is clear and moist.   Eyes: Pupils are equal, round, and reactive to light. Conjunctivae and EOM are normal.   Neck: Normal range of motion. Neck supple.   Cardiovascular: Normal rate, regular rhythm, normal heart sounds and intact distal pulses.    Veinous insufficiency w/ varicosity   Pulmonary/Chest: Effort normal and breath sounds normal.   Abdominal: Soft. Bowel sounds are normal.   Genitourinary:   Genitourinary Comments: Per dr. lopez   Musculoskeletal: Normal range of motion.   Ankle w/ full rom.    Neurological: He is alert and oriented to person, place, and time.   Skin: Skin is warm and dry.   Diffuse SK.   Derm eval every 6 months. "    Psychiatric: He has a normal mood and affect. His behavior is normal. Judgment and thought content normal.   Nursing note and vitals reviewed.      Assessment/Plan   Scar was seen today for annual exam.    Diagnoses and all orders for this visit:    Arteriosclerosis of coronary artery    Chronic atrial fibrillation (CMS/HCC)    Benign essential hypertension    Pure hypercholesterolemia        Discussion    AWV.      Direct observation of cognitive ability was evaluated.  Patient was given health advice or handouts on the following:  nutrition, fall prevention, exercise, weight management, neck pain, vascular screening. Patient will continue all medications. Lab results from 7/18 from [facility] reviewed and normal. psa through urology and will continue sanctura for bph. Lipids are at goal w/ exception hdl is low. Increased activity as tolerated w/ ankle strain. To get vasc screen. Continue med for rate control and on asa daily. F/u w/ cardiology routinely. He will continue cardiac rehab 3 days weekly.  Advised compression stockings daily and shingrix vaccination. He will f/u in 6-8 months or prn.                 Future Appointments  Date Time Provider Department Center   7/25/2018 7:45 AM [facility]   7/27/2018 7:45 AM [facility]   7/30/2018 7:45 AM [facility]   8/1/2018 7:45 AM [facility]   8/3/2018 7:45 AM [facility]AC [facility]   8/6/2018 7:45 AM [facility]AC [facility]   8/8/2018 7:45 AM [facility]   8/10/2018 7:45 AM [facility]AC [facility]   8/13/2018 7:45 AM [facility]AC [facility]   8/15/2018 7:45 AM [facility]   8/17/2018 7:45 AM [facility]   8/20/2018 7:45 AM [facility]   8/22/2018 7:45 AM [facility]   8/24/2018 7:45 AM [facility] - [facility]IAC RE[facility]   8/27/2018 7:45 AM [facility] - CARDIAC REH[facility]  [facility]   8/29/2018 7:45 AM [facility]   8/31/2018 7:45 AM [facility] - CARDIAC REHAB  [facility]   9/5/2018 7:45 AM [facility] - CARDIAC REHAB  [facility]   9/7/2018 7:45 AM [facility] - CARDIAC REHAB  [facility]   9/10/2018 7:45 AM [facility] - CARDIAC REHAB  [facility]   9/12/2018 7:45 AM [facility] - CARDIAC REHAB  [facility]   9/14/2018 7:45 AM [facility] - CARDIAC REHAB  [facility]   9/17/2018 7:45 AM [facility] - CARDIAC REHAB  [facility]   9/19/2018 7:45 AM [facility] - CARDIAC REHAB  [facility]   9/21/2018 7:45 AM [facility] - CARDIAC REHAB  [facility]   9/24/2018 7:45 AM [facility] - CARDIAC REHAB  [facility]   9/26/2018 7:45 AM [facility] - CARDIAC REHAB  [facility]   9/28/2018 7:45 AM [facility] - CARDIAC REHAB  [facility]   10/1/2018 7:45 AM [facility] - CARDIAC REHAB  [facility]   10/3/2018 7:45 AM [facility] - CARDIAC REHAB  [facility]   10/5/2018 7:45 AM [facility] - CARDIAC REHAB  [facility]   10/8/2018 7:45 AM [facility] - CARDIAC REHAB  [facility]   10/10/2018 7:45 AM [facility] - CARDIAC REHAB  [facility]   10/12/2018 7:45 AM [facility] - CARDIAC REHAB  [facility]   10/15/2018 7:45 AM [facility] - CARDIAC REHAB  [facility]   10/17/2018 7:45 AM [facility] - CARDIAC REHAB  [facility]   10/19/2018 7:45 AM [facility] - CARDIAC REHAB  [facility]   10/22/2018 7:45 AM [facility] - CARDIAC REHAB  [facility]   10/24/2018 7:45 AM [facility] - CARDIAC REHAB  [facility]   10/26/2018 7:45 AM [facility] - CARDIAC REHAB  [facility]   10/29/2018 7:45 AM [facility] - CARDIAC REHAB  [facility]   10/31/2018 7:45 AM [facility] - CARDIAC REHAB  [facility]   11/2/2018 7:45 AM [facility] - CARDIAC REHAB  [facility]   11/5/2018 7:45 AM [facility] - CARDIAC REHAB  [facility]   11/7/2018 7:45 AM [facility] - CARDIAC REHAB  [facility]   11/9/2018 7:45 AM [facility] - CARDIAC REHAB  [facility]   11/12/2018 7:45 AM [facility] - CARDIAC RE[facility]  [facility]   11/14/2018 7:45 AM [facility] - CARDIAC REHAB  [facility]   11/16/2018 7:45 AM [facility] - CARDIAC REHAB  [facility]    11/19/2018 7:45 AM [facility] - CARDIAC REHAB  [facility]   11/21/2018 7:45 AM [facility] - CARDIAC REHAB  [facility]   11/23/2018 7:45 AM [facility] - CARDIAC REHAB  [facility]   11/26/2018 7:45 AM [facility] - CARDIAC REHAB  [facility]   11/28/2018 7:45 AM [facility] - CARDIAC REHAB  [facility]   11/30/2018 7:45 AM [facility] - CARDIAC REHAB  [facility]   12/3/2018 7:45 AM [facility] - CARDIAC REHAB  [facility]   12/5/2018 7:45 AM [facility] - CARDIAC REHAB  [facility]   12/7/2018 7:45 AM [facility] - CARDIAC REHAB  [facility]   12/10/2018 7:45 AM [facility] - CARDIAC REHAB  [facility]   12/12/2018 7:45 AM [facility] - CARDIAC REHAB  [facility]   12/14/2018 7:45 AM [facility] - [facility]ARD[facility]AC RE[facility]  [facility]   12/17/2018 7:45 AM [facility] - CARDIAC REHAB  [facility]   12/19/2018 7:45 AM [facility] - CARDIAC REHAB  [facility]   12/21/2018 7:45 AM [facility] - CARDIAC RE[facility]  [facility]   12/24/2018 7:45 AM [facility] - CARDIAC REHAB  [facility]   12/26/2018 7:45 AM [facility] - CARDIAC REHAB  [facility]   12/28/2018 7:45 AM [facility] - CARDIAC REH[facility]  [facility]   12/31/2018 7:45 AM [facility] - CARDIAC REHAB  [facility]   1/2/2019 7:45 AM [facility] - CARDIAC REHAB  [facility]   1/4/2019 7:45 AM [facility] - CARDIAC REHAB  [facility]

## 2018-07-24 ENCOUNTER — TRANSCRIBE ORDERS (OUTPATIENT)
Dept: ADMINISTRATIVE | Facility: HOSPITAL | Age: 83
End: 2018-07-24

## 2018-07-24 DIAGNOSIS — Z13.9 VISIT FOR SCREENING: Primary | ICD-10-CM

## 2018-07-25 ENCOUNTER — OFFICE VISIT (OUTPATIENT)
Dept: CARDIAC REHAB | Facility: HOSPITAL | Age: 83
End: 2018-07-25

## 2018-07-25 DIAGNOSIS — Z95.5 HISTORY OF PLACEMENT OF STENT IN LAD CORONARY ARTERY: Primary | ICD-10-CM

## 2018-07-30 ENCOUNTER — OFFICE VISIT (OUTPATIENT)
Dept: CARDIAC REHAB | Facility: HOSPITAL | Age: 83
End: 2018-07-30

## 2018-07-30 DIAGNOSIS — Z95.5 HISTORY OF PLACEMENT OF STENT IN LAD CORONARY ARTERY: Primary | ICD-10-CM

## 2018-08-01 ENCOUNTER — OFFICE VISIT (OUTPATIENT)
Dept: CARDIAC REHAB | Facility: HOSPITAL | Age: 83
End: 2018-08-01

## 2018-08-01 DIAGNOSIS — Z95.5 HISTORY OF PLACEMENT OF STENT IN LAD CORONARY ARTERY: Primary | ICD-10-CM

## 2018-08-03 ENCOUNTER — HOSPITAL ENCOUNTER (OUTPATIENT)
Dept: CARDIOLOGY | Facility: HOSPITAL | Age: 83
Discharge: HOME | End: 2018-08-03
Admitting: INTERNAL MEDICINE

## 2018-08-03 VITALS
WEIGHT: 245 LBS | SYSTOLIC BLOOD PRESSURE: 134 MMHG | DIASTOLIC BLOOD PRESSURE: 70 MMHG | HEART RATE: 68 BPM | HEIGHT: 71 IN | BODY MASS INDEX: 34.3 KG/M2

## 2018-08-03 DIAGNOSIS — Z13.9 VISIT FOR SCREENING: ICD-10-CM

## 2018-08-03 LAB
BH CV ECHO MEAS - DIST AO DIAM: NORMAL CM
BH CV VAS BP LEFT ARM: NORMAL MMHG
BH CV VAS BP RIGHT ARM: NORMAL MMHG
BH CV XLRA MEAS - MID AO DIAM: NORMAL CM
BH CV XLRA MEAS - PAD LEFT ABI DP: 1.2
BH CV XLRA MEAS - PAD LEFT ABI PT: 1.22
BH CV XLRA MEAS - PAD LEFT ARM: 133 MMHG
BH CV XLRA MEAS - PAD LEFT LEG DP: 162 MMHG
BH CV XLRA MEAS - PAD LEFT LEG PT: 164 MMHG
BH CV XLRA MEAS - PAD RIGHT ABI DP: 1.14
BH CV XLRA MEAS - PAD RIGHT ABI PT: 1.19
BH CV XLRA MEAS - PAD RIGHT ARM: 134 MMHG
BH CV XLRA MEAS - PAD RIGHT LEG DP: 154 MMHG
BH CV XLRA MEAS - PAD RIGHT LEG PT: 160 MMHG
BH CV XLRA MEAS - PROX AO DIAM: NORMAL CM
BH CV XLRA MEAS LEFT ICA/CCA RATIO: 0.69
BH CV XLRA MEAS LEFT MID CCA PSV: NORMAL CM/SEC
BH CV XLRA MEAS LEFT MID ICA PSV: NORMAL CM/SEC
BH CV XLRA MEAS LEFT PROX ECA PSV: NORMAL CM/SEC
BH CV XLRA MEAS RIGHT ICA/CCA RATIO: 0.76
BH CV XLRA MEAS RIGHT MID CCA PSV: NORMAL CM/SEC
BH CV XLRA MEAS RIGHT MID ICA PSV: NORMAL CM/SEC
BH CV XLRA MEAS RIGHT PROX ECA PSV: NORMAL CM/SEC

## 2018-08-03 PROCEDURE — 93799 UNLISTED CV SVC/PROCEDURE: CPT

## 2018-08-06 ENCOUNTER — OFFICE VISIT (OUTPATIENT)
Dept: CARDIAC REHAB | Facility: HOSPITAL | Age: 83
End: 2018-08-06

## 2018-08-06 DIAGNOSIS — Z95.5 HISTORY OF PLACEMENT OF STENT IN LAD CORONARY ARTERY: Primary | ICD-10-CM

## 2018-08-08 ENCOUNTER — OFFICE VISIT (OUTPATIENT)
Dept: CARDIAC REHAB | Facility: HOSPITAL | Age: 83
End: 2018-08-08

## 2018-08-08 DIAGNOSIS — Z95.5 HISTORY OF PLACEMENT OF STENT IN LAD CORONARY ARTERY: Primary | ICD-10-CM

## 2018-08-10 ENCOUNTER — OFFICE VISIT (OUTPATIENT)
Dept: CARDIAC REHAB | Facility: HOSPITAL | Age: 83
End: 2018-08-10

## 2018-08-10 DIAGNOSIS — Z95.5 HISTORY OF PLACEMENT OF STENT IN LAD CORONARY ARTERY: Primary | ICD-10-CM

## 2018-08-13 ENCOUNTER — OFFICE VISIT (OUTPATIENT)
Dept: CARDIAC REHAB | Facility: HOSPITAL | Age: 83
End: 2018-08-13

## 2018-08-13 DIAGNOSIS — Z95.5 HISTORY OF PLACEMENT OF STENT IN LAD CORONARY ARTERY: Primary | ICD-10-CM

## 2018-08-15 ENCOUNTER — OFFICE VISIT (OUTPATIENT)
Dept: CARDIAC REHAB | Facility: HOSPITAL | Age: 83
End: 2018-08-15

## 2018-08-15 DIAGNOSIS — Z95.5 HISTORY OF PLACEMENT OF STENT IN LAD CORONARY ARTERY: Primary | ICD-10-CM

## 2018-08-20 ENCOUNTER — OFFICE VISIT (OUTPATIENT)
Dept: CARDIAC REHAB | Facility: HOSPITAL | Age: 83
End: 2018-08-20

## 2018-08-20 DIAGNOSIS — Z95.5 HISTORY OF PLACEMENT OF STENT IN LAD CORONARY ARTERY: Primary | ICD-10-CM

## 2018-08-22 ENCOUNTER — OFFICE VISIT (OUTPATIENT)
Dept: CARDIAC REHAB | Facility: HOSPITAL | Age: 83
End: 2018-08-22

## 2018-08-22 DIAGNOSIS — Z95.5 HISTORY OF PLACEMENT OF STENT IN LAD CORONARY ARTERY: Primary | ICD-10-CM

## 2018-08-24 ENCOUNTER — OFFICE VISIT (OUTPATIENT)
Dept: CARDIAC REHAB | Facility: HOSPITAL | Age: 83
End: 2018-08-24

## 2018-08-24 DIAGNOSIS — Z95.5 HISTORY OF PLACEMENT OF STENT IN LAD CORONARY ARTERY: Primary | ICD-10-CM

## 2018-08-27 ENCOUNTER — OFFICE VISIT (OUTPATIENT)
Dept: CARDIAC REHAB | Facility: HOSPITAL | Age: 83
End: 2018-08-27

## 2018-08-27 DIAGNOSIS — Z95.5 HISTORY OF PLACEMENT OF STENT IN LAD CORONARY ARTERY: Primary | ICD-10-CM

## 2018-08-29 ENCOUNTER — OFFICE VISIT (OUTPATIENT)
Dept: CARDIAC REHAB | Facility: HOSPITAL | Age: 83
End: 2018-08-29

## 2018-08-29 DIAGNOSIS — Z95.5 HISTORY OF PLACEMENT OF STENT IN LAD CORONARY ARTERY: Primary | ICD-10-CM

## 2018-08-31 ENCOUNTER — OFFICE VISIT (OUTPATIENT)
Dept: CARDIAC REHAB | Facility: HOSPITAL | Age: 83
End: 2018-08-31

## 2018-08-31 DIAGNOSIS — Z95.5 HISTORY OF PLACEMENT OF STENT IN LAD CORONARY ARTERY: Primary | ICD-10-CM

## 2018-09-05 ENCOUNTER — OFFICE VISIT (OUTPATIENT)
Dept: CARDIAC REHAB | Facility: HOSPITAL | Age: 83
End: 2018-09-05

## 2018-09-05 DIAGNOSIS — Z95.5 HISTORY OF PLACEMENT OF STENT IN LAD CORONARY ARTERY: Primary | ICD-10-CM

## 2018-09-07 ENCOUNTER — OFFICE VISIT (OUTPATIENT)
Dept: CARDIAC REHAB | Facility: HOSPITAL | Age: 83
End: 2018-09-07

## 2018-09-07 DIAGNOSIS — Z95.5 HISTORY OF PLACEMENT OF STENT IN LAD CORONARY ARTERY: Primary | ICD-10-CM

## 2018-09-17 ENCOUNTER — OFFICE VISIT (OUTPATIENT)
Dept: CARDIAC REHAB | Facility: HOSPITAL | Age: 83
End: 2018-09-17

## 2018-09-17 DIAGNOSIS — Z95.5 HISTORY OF PLACEMENT OF STENT IN LAD CORONARY ARTERY: Primary | ICD-10-CM

## 2018-09-19 ENCOUNTER — OFFICE VISIT (OUTPATIENT)
Dept: CARDIAC REHAB | Facility: HOSPITAL | Age: 83
End: 2018-09-19

## 2018-09-19 DIAGNOSIS — Z95.5 HISTORY OF PLACEMENT OF STENT IN LAD CORONARY ARTERY: Primary | ICD-10-CM

## 2018-09-21 ENCOUNTER — OFFICE VISIT (OUTPATIENT)
Dept: CARDIAC REHAB | Facility: HOSPITAL | Age: 83
End: 2018-09-21

## 2018-09-21 DIAGNOSIS — Z95.5 HISTORY OF PLACEMENT OF STENT IN LAD CORONARY ARTERY: Primary | ICD-10-CM

## 2018-09-24 ENCOUNTER — OFFICE VISIT (OUTPATIENT)
Dept: CARDIAC REHAB | Facility: HOSPITAL | Age: 83
End: 2018-09-24

## 2018-09-24 DIAGNOSIS — Z95.5 HISTORY OF PLACEMENT OF STENT IN LAD CORONARY ARTERY: Primary | ICD-10-CM

## 2018-09-26 ENCOUNTER — OFFICE VISIT (OUTPATIENT)
Dept: CARDIAC REHAB | Facility: HOSPITAL | Age: 83
End: 2018-09-26

## 2018-09-26 DIAGNOSIS — Z95.5 HISTORY OF PLACEMENT OF STENT IN LAD CORONARY ARTERY: Primary | ICD-10-CM

## 2018-09-28 ENCOUNTER — OFFICE VISIT (OUTPATIENT)
Dept: CARDIAC REHAB | Facility: HOSPITAL | Age: 83
End: 2018-09-28

## 2018-09-28 DIAGNOSIS — Z95.5 HISTORY OF PLACEMENT OF STENT IN LAD CORONARY ARTERY: Primary | ICD-10-CM

## 2018-10-01 ENCOUNTER — OFFICE VISIT (OUTPATIENT)
Dept: CARDIAC REHAB | Facility: HOSPITAL | Age: 83
End: 2018-10-01

## 2018-10-01 DIAGNOSIS — Z95.5 HISTORY OF PLACEMENT OF STENT IN LAD CORONARY ARTERY: Primary | ICD-10-CM

## 2018-10-03 ENCOUNTER — OFFICE VISIT (OUTPATIENT)
Dept: CARDIAC REHAB | Facility: HOSPITAL | Age: 83
End: 2018-10-03

## 2018-10-03 DIAGNOSIS — Z95.5 HISTORY OF PLACEMENT OF STENT IN LAD CORONARY ARTERY: Primary | ICD-10-CM

## 2018-10-05 ENCOUNTER — OFFICE VISIT (OUTPATIENT)
Dept: CARDIAC REHAB | Facility: HOSPITAL | Age: 83
End: 2018-10-05

## 2018-10-05 DIAGNOSIS — Z95.5 HISTORY OF PLACEMENT OF STENT IN LAD CORONARY ARTERY: Primary | ICD-10-CM

## 2018-10-08 ENCOUNTER — OFFICE VISIT (OUTPATIENT)
Dept: CARDIAC REHAB | Facility: HOSPITAL | Age: 83
End: 2018-10-08

## 2018-10-08 DIAGNOSIS — Z95.5 HISTORY OF PLACEMENT OF STENT IN LAD CORONARY ARTERY: Primary | ICD-10-CM

## 2018-10-10 ENCOUNTER — OFFICE VISIT (OUTPATIENT)
Dept: CARDIAC REHAB | Facility: HOSPITAL | Age: 83
End: 2018-10-10

## 2018-10-10 DIAGNOSIS — Z95.5 HISTORY OF PLACEMENT OF STENT IN LAD CORONARY ARTERY: Primary | ICD-10-CM

## 2018-10-12 ENCOUNTER — OFFICE VISIT (OUTPATIENT)
Dept: CARDIAC REHAB | Facility: HOSPITAL | Age: 83
End: 2018-10-12

## 2018-10-12 DIAGNOSIS — Z95.5 HISTORY OF PLACEMENT OF STENT IN LAD CORONARY ARTERY: Primary | ICD-10-CM

## 2018-10-15 ENCOUNTER — OFFICE VISIT (OUTPATIENT)
Dept: CARDIAC REHAB | Facility: HOSPITAL | Age: 83
End: 2018-10-15

## 2018-10-15 DIAGNOSIS — Z95.5 HISTORY OF PLACEMENT OF STENT IN LAD CORONARY ARTERY: Primary | ICD-10-CM

## 2018-10-17 ENCOUNTER — OFFICE VISIT (OUTPATIENT)
Dept: CARDIAC REHAB | Facility: HOSPITAL | Age: 83
End: 2018-10-17

## 2018-10-17 DIAGNOSIS — Z95.5 HISTORY OF PLACEMENT OF STENT IN LAD CORONARY ARTERY: Primary | ICD-10-CM

## 2018-10-22 ENCOUNTER — OFFICE VISIT (OUTPATIENT)
Dept: CARDIAC REHAB | Facility: HOSPITAL | Age: 83
End: 2018-10-22

## 2018-10-22 DIAGNOSIS — Z95.5 HISTORY OF PLACEMENT OF STENT IN LAD CORONARY ARTERY: Primary | ICD-10-CM

## 2018-10-24 ENCOUNTER — OFFICE VISIT (OUTPATIENT)
Dept: CARDIAC REHAB | Facility: HOSPITAL | Age: 83
End: 2018-10-24

## 2018-10-24 DIAGNOSIS — Z95.5 HISTORY OF PLACEMENT OF STENT IN LAD CORONARY ARTERY: Primary | ICD-10-CM

## 2018-10-31 ENCOUNTER — OFFICE VISIT (OUTPATIENT)
Dept: CARDIAC REHAB | Facility: HOSPITAL | Age: 83
End: 2018-10-31

## 2018-10-31 DIAGNOSIS — Z95.5 HISTORY OF PLACEMENT OF STENT IN LAD CORONARY ARTERY: Primary | ICD-10-CM

## 2018-11-05 ENCOUNTER — OFFICE VISIT (OUTPATIENT)
Dept: CARDIAC REHAB | Facility: HOSPITAL | Age: 83
End: 2018-11-05

## 2018-11-05 ENCOUNTER — OFFICE VISIT (OUTPATIENT)
Dept: INTERNAL MEDICINE | Facility: CLINIC | Age: 83
End: 2018-11-05

## 2018-11-05 VITALS
DIASTOLIC BLOOD PRESSURE: 74 MMHG | WEIGHT: 250 LBS | SYSTOLIC BLOOD PRESSURE: 136 MMHG | OXYGEN SATURATION: 98 % | HEART RATE: 81 BPM | BODY MASS INDEX: 34.87 KG/M2

## 2018-11-05 DIAGNOSIS — S61.412D LACERATION OF LEFT HAND WITHOUT FOREIGN BODY, SUBSEQUENT ENCOUNTER: ICD-10-CM

## 2018-11-05 DIAGNOSIS — Z95.5 HISTORY OF PLACEMENT OF STENT IN LAD CORONARY ARTERY: Primary | ICD-10-CM
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
## 2018-11-05 PROCEDURE — 99213 OFFICE O/P EST LOW 20 MIN: CPT | Performed by: INTERNAL MEDICINE

## 2018-11-05 RX ORDER — SACCHAROMYCES BOULARDII 250 MG
250 CAPSULE ORAL 2 TIMES DAILY
Qty: 20 CAPSULE | Refills: 0 | Status: SHIPPED | OUTPATIENT
Start: 2018-11-05 | End: 2019-01-29

## 2018-11-05 NOTE — PROGRESS NOTES
Chief Complaint   Patient presents with   • Laceration     L hand from cat scratch       History of Present Illness   Scar Pérez is a 84 y.o. male presents for [facility] f/u. Patient has a cat bite/ laceration on his left hand. Went to [facility] 2 days later. He was given augmentin, steri strips were applied, and he was given a brace to prevent re opening the wound. He is feeling well. Afebrile. No pain in the hand. No redness or swelling at this time. tdap administered 7/17. Indoor cat. Current on its vaccinations as well.         The following portions of the patient's history were reviewed and updated as appropriate: allergies, current medications, past family history, past medical history, past social history, past surgical history and problem list.  Current Outpatient Prescriptions on File Prior to Visit   Medication Sig Dispense Refill   • aspirin 325 MG tablet Take 1 tablet by mouth daily.     • atorvastatin (LIPITOR) 20 MG tablet Take 20 mg by mouth daily.     • Cholecalciferol (VITAMIN D3) 5000 UNITS tablet Take by mouth.     • Cyanocobalamin (B-12) 1000 MCG capsule Take 1 capsule by mouth.     • fluocinolone (SYNALAR) 0.01 % external solution Apply topically. APPLY TO AFFECTED AREA AS DIRECTED     • hydrochlorothiazide (MICROZIDE) 12.5 MG capsule Take 1 capsule by mouth Daily. 30 capsule 5   • hydrocortisone (ANUSOL-HC) 25 MG suppository Hydrocortisone Acetate 25 MG Rectal Suppository; Patient Sig: Hydrocortisone Acetate 25 MG Rectal Suppository INSERT ONE SUPPOSITORY RECTALLY TWICE DAILY AS NEEDED; 12; 4; 19-Nov-2013; Active     • metoprolol tartrate (LOPRESSOR) 100 MG tablet Take 1 tablet by mouth 2 (two) times a day.     • niacin 500 MG tablet Take 1 tablet by mouth daily.     • nitroglycerin (NITROSTAT) 0.4 MG SL tablet Place 0.4 mg under the tongue.     • pantoprazole (PROTONIX) 40 MG EC tablet Take 1 tablet by mouth daily.     • trospium (SANCTURA) 20 MG tablet Take 20 mg by mouth.     • valsartan  (DIOVAN) 160 MG tablet Take 1 tablet by mouth Daily. 90 tablet 2     No current facility-administered medications on file prior to visit.      Review of Systems   Constitutional: Negative.    HENT: Negative.    Eyes: Negative.    Respiratory: Negative.    Cardiovascular: Negative.    Gastrointestinal: Negative.    Endocrine: Negative.    Musculoskeletal: Negative.    Skin:        Left hand skin laceration. Healing fairly well. No erythema or drainage. Mild bruising    Allergic/Immunologic: Negative.    Neurological: Negative.    Hematological: Negative.    Psychiatric/Behavioral: Negative.        Objective   Physical Exam   Constitutional: He is oriented to person, place, and time. He appears well-developed and well-nourished.   HENT:   Head: Normocephalic and atraumatic.   Right Ear: External ear normal.   Left Ear: External ear normal.   Nose: Nose normal.   Mouth/Throat: Oropharynx is clear and moist.   Eyes: Pupils are equal, round, and reactive to light. Conjunctivae and EOM are normal.   Neck: Normal range of motion. Neck supple.   Cardiovascular: Normal rate, regular rhythm, normal heart sounds and intact distal pulses.    Pulmonary/Chest: Effort normal.   Abdominal: Soft. Bowel sounds are normal.   Musculoskeletal: Normal range of motion.   Neurological: He is alert and oriented to person, place, and time.   Skin: Skin is warm and dry.   Laceration left hand.  No erythema or drainage. Appropriately healing.    Psychiatric: He has a normal mood and affect. His behavior is normal. Judgment and thought content normal.   Nursing note and vitals reviewed.       /74   Pulse 81   Wt 113 kg (250 lb)   SpO2 98%   BMI 34.87 kg/m²     Assessment/Plan   Diagnoses and all orders for this visit:    Cat bite, subsequent encounter    Laceration of left hand without foreign body, subsequent encounter    Other orders  -     saccharomyces boulardii (FLORASTOR) 250 MG capsule; Take 1 capsule by mouth 2 (Two) Times a  Day.      Cat bite/ laceration. Hand appears to be healing well. He will continue augmentin until completion. Will add florastor to prevent gi intolerance. He may keep bracing for another day and monitor. Continue current meds for bp regulation. To check w/ his pharmacy to see if he was taking recalled version of valsartan? F/u routinely.

## 2018-11-07 ENCOUNTER — OFFICE VISIT (OUTPATIENT)
Dept: CARDIAC REHAB | Facility: HOSPITAL | Age: 83
End: 2018-11-07

## 2018-11-07 DIAGNOSIS — Z95.5 HISTORY OF PLACEMENT OF STENT IN LAD CORONARY ARTERY: Primary | ICD-10-CM

## 2018-11-09 ENCOUNTER — OFFICE VISIT (OUTPATIENT)
Dept: CARDIAC REHAB | Facility: HOSPITAL | Age: 83
End: 2018-11-09

## 2018-11-09 DIAGNOSIS — Z95.5 HISTORY OF PLACEMENT OF STENT IN LAD CORONARY ARTERY: Primary | ICD-10-CM

## 2018-11-12 ENCOUNTER — OFFICE VISIT (OUTPATIENT)
Dept: CARDIAC REHAB | Facility: HOSPITAL | Age: 83
End: 2018-11-12

## 2018-11-12 DIAGNOSIS — Z95.5 HISTORY OF PLACEMENT OF STENT IN LAD CORONARY ARTERY: Primary | ICD-10-CM

## 2018-11-14 ENCOUNTER — OFFICE VISIT (OUTPATIENT)
Dept: CARDIAC REHAB | Facility: HOSPITAL | Age: 83
End: 2018-11-14

## 2018-11-14 DIAGNOSIS — Z95.5 HISTORY OF PLACEMENT OF STENT IN LAD CORONARY ARTERY: Primary | ICD-10-CM

## 2018-11-16 ENCOUNTER — OFFICE VISIT (OUTPATIENT)
Dept: CARDIAC REHAB | Facility: HOSPITAL | Age: 83
End: 2018-11-16

## 2018-11-16 DIAGNOSIS — Z95.5 HISTORY OF PLACEMENT OF STENT IN LAD CORONARY ARTERY: Primary | ICD-10-CM

## 2018-11-19 ENCOUNTER — OFFICE VISIT (OUTPATIENT)
Dept: CARDIAC REHAB | Facility: HOSPITAL | Age: 83
End: 2018-11-19

## 2018-11-19 DIAGNOSIS — Z95.5 HISTORY OF PLACEMENT OF STENT IN LAD CORONARY ARTERY: Primary | ICD-10-CM

## 2018-11-26 ENCOUNTER — OFFICE VISIT (OUTPATIENT)
Dept: CARDIAC REHAB | Facility: HOSPITAL | Age: 83
End: 2018-11-26

## 2018-11-26 DIAGNOSIS — Z95.5 HISTORY OF PLACEMENT OF STENT IN LAD CORONARY ARTERY: Primary | ICD-10-CM

## 2018-11-28 ENCOUNTER — OFFICE VISIT (OUTPATIENT)
Dept: CARDIAC REHAB | Facility: HOSPITAL | Age: 83
End: 2018-11-28

## 2018-11-28 DIAGNOSIS — Z95.5 HISTORY OF PLACEMENT OF STENT IN LAD CORONARY ARTERY: Primary | ICD-10-CM

## 2018-11-30 ENCOUNTER — OFFICE VISIT (OUTPATIENT)
Dept: CARDIAC REHAB | Facility: HOSPITAL | Age: 83
End: 2018-11-30

## 2018-11-30 DIAGNOSIS — Z95.5 HISTORY OF PLACEMENT OF STENT IN LAD CORONARY ARTERY: Primary | ICD-10-CM

## 2018-12-03 ENCOUNTER — OFFICE VISIT (OUTPATIENT)
Dept: CARDIAC REHAB | Facility: HOSPITAL | Age: 83
End: 2018-12-03

## 2018-12-03 DIAGNOSIS — Z95.5 HISTORY OF PLACEMENT OF STENT IN LAD CORONARY ARTERY: Primary | ICD-10-CM

## 2018-12-05 ENCOUNTER — OFFICE VISIT (OUTPATIENT)
Dept: CARDIAC REHAB | Facility: HOSPITAL | Age: 83
End: 2018-12-05

## 2018-12-05 DIAGNOSIS — Z95.5 HISTORY OF PLACEMENT OF STENT IN LAD CORONARY ARTERY: Primary | ICD-10-CM

## 2018-12-07 ENCOUNTER — OFFICE VISIT (OUTPATIENT)
Dept: CARDIAC REHAB | Facility: HOSPITAL | Age: 83
End: 2018-12-07

## 2018-12-07 DIAGNOSIS — Z95.5 HISTORY OF PLACEMENT OF STENT IN LAD CORONARY ARTERY: Primary | ICD-10-CM

## 2018-12-12 ENCOUNTER — TRANSCRIBE ORDERS (OUTPATIENT)
Dept: CARDIAC REHAB | Facility: HOSPITAL | Age: 83
End: 2018-12-12

## 2018-12-12 DIAGNOSIS — Z95.5 S/P DRUG ELUTING CORONARY STENT PLACEMENT: Primary | ICD-10-CM

## 2018-12-19 ENCOUNTER — OFFICE VISIT (OUTPATIENT)
Dept: CARDIAC REHAB | Facility: HOSPITAL | Age: 83
End: 2018-12-19

## 2018-12-19 DIAGNOSIS — Z95.5 HISTORY OF PLACEMENT OF STENT IN LAD CORONARY ARTERY: Primary | ICD-10-CM

## 2018-12-21 ENCOUNTER — OFFICE VISIT (OUTPATIENT)
Dept: CARDIAC REHAB | Facility: HOSPITAL | Age: 83
End: 2018-12-21

## 2018-12-21 DIAGNOSIS — Z95.5 HISTORY OF PLACEMENT OF STENT IN LAD CORONARY ARTERY: Primary | ICD-10-CM

## 2019-01-02 ENCOUNTER — OFFICE VISIT (OUTPATIENT)
Dept: CARDIAC REHAB | Facility: HOSPITAL | Age: 84
End: 2019-01-02

## 2019-01-02 DIAGNOSIS — Z95.5 HISTORY OF PLACEMENT OF STENT IN LAD CORONARY ARTERY: Primary | ICD-10-CM

## 2019-01-04 ENCOUNTER — OFFICE VISIT (OUTPATIENT)
Dept: CARDIAC REHAB | Facility: HOSPITAL | Age: 84
End: 2019-01-04

## 2019-01-04 DIAGNOSIS — Z95.5 HISTORY OF PLACEMENT OF STENT IN LAD CORONARY ARTERY: Primary | ICD-10-CM

## 2019-01-07 ENCOUNTER — OFFICE VISIT (OUTPATIENT)
Dept: CARDIAC REHAB | Facility: HOSPITAL | Age: 84
End: 2019-01-07

## 2019-01-07 DIAGNOSIS — Z95.5 HISTORY OF PLACEMENT OF STENT IN LAD CORONARY ARTERY: Primary | ICD-10-CM

## 2019-01-09 ENCOUNTER — OFFICE VISIT (OUTPATIENT)
Dept: CARDIAC REHAB | Facility: HOSPITAL | Age: 84
End: 2019-01-09

## 2019-01-09 DIAGNOSIS — Z95.5 HISTORY OF PLACEMENT OF STENT IN LAD CORONARY ARTERY: Primary | ICD-10-CM

## 2019-01-11 ENCOUNTER — OFFICE VISIT (OUTPATIENT)
Dept: CARDIAC REHAB | Facility: HOSPITAL | Age: 84
End: 2019-01-11

## 2019-01-11 DIAGNOSIS — Z95.5 HISTORY OF PLACEMENT OF STENT IN LAD CORONARY ARTERY: Primary | ICD-10-CM

## 2019-01-14 ENCOUNTER — OFFICE VISIT (OUTPATIENT)
Dept: CARDIAC REHAB | Facility: HOSPITAL | Age: 84
End: 2019-01-14

## 2019-01-14 DIAGNOSIS — Z95.5 HISTORY OF PLACEMENT OF STENT IN LAD CORONARY ARTERY: Primary | ICD-10-CM

## 2019-01-16 ENCOUNTER — OFFICE VISIT (OUTPATIENT)
Dept: CARDIAC REHAB | Facility: HOSPITAL | Age: 84
End: 2019-01-16

## 2019-01-16 DIAGNOSIS — Z95.5 HISTORY OF PLACEMENT OF STENT IN LAD CORONARY ARTERY: Primary | ICD-10-CM

## 2019-01-18 ENCOUNTER — OFFICE VISIT (OUTPATIENT)
Dept: CARDIAC REHAB | Facility: HOSPITAL | Age: 84
End: 2019-01-18

## 2019-01-18 DIAGNOSIS — Z95.5 HISTORY OF PLACEMENT OF STENT IN LAD CORONARY ARTERY: Primary | ICD-10-CM

## 2019-01-21 ENCOUNTER — OFFICE VISIT (OUTPATIENT)
Dept: CARDIAC REHAB | Facility: HOSPITAL | Age: 84
End: 2019-01-21

## 2019-01-21 DIAGNOSIS — Z95.5 HISTORY OF PLACEMENT OF STENT IN LAD CORONARY ARTERY: Primary | ICD-10-CM

## 2019-01-23 ENCOUNTER — OFFICE VISIT (OUTPATIENT)
Dept: CARDIAC REHAB | Facility: HOSPITAL | Age: 84
End: 2019-01-23

## 2019-01-23 DIAGNOSIS — Z95.5 HISTORY OF PLACEMENT OF STENT IN LAD CORONARY ARTERY: Primary | ICD-10-CM

## 2019-01-25 ENCOUNTER — OFFICE VISIT (OUTPATIENT)
Dept: CARDIAC REHAB | Facility: HOSPITAL | Age: 84
End: 2019-01-25

## 2019-01-25 DIAGNOSIS — Z95.5 HISTORY OF PLACEMENT OF STENT IN LAD CORONARY ARTERY: Primary | ICD-10-CM

## 2019-01-28 ENCOUNTER — OFFICE VISIT (OUTPATIENT)
Dept: CARDIAC REHAB | Facility: HOSPITAL | Age: 84
End: 2019-01-28

## 2019-01-28 DIAGNOSIS — Z95.5 HISTORY OF PLACEMENT OF STENT IN LAD CORONARY ARTERY: Primary | ICD-10-CM

## 2019-01-29 ENCOUNTER — OFFICE VISIT (OUTPATIENT)
Dept: INTERNAL MEDICINE | Facility: CLINIC | Age: 84
End: 2019-01-29

## 2019-01-29 VITALS
HEART RATE: 83 BPM | BODY MASS INDEX: 35.01 KG/M2 | DIASTOLIC BLOOD PRESSURE: 80 MMHG | WEIGHT: 251 LBS | OXYGEN SATURATION: 98 % | SYSTOLIC BLOOD PRESSURE: 140 MMHG

## 2019-01-29 DIAGNOSIS — I25.10 ARTERIOSCLEROSIS OF CORONARY ARTERY: Primary | ICD-10-CM

## 2019-01-29 DIAGNOSIS — I10 BENIGN ESSENTIAL HYPERTENSION: ICD-10-CM

## 2019-01-29 DIAGNOSIS — S39.012A BACK STRAIN, INITIAL ENCOUNTER: ICD-10-CM

## 2019-01-29 PROCEDURE — 99214 OFFICE O/P EST MOD 30 MIN: CPT | Performed by: INTERNAL MEDICINE

## 2019-01-29 RX ORDER — CYCLOBENZAPRINE HCL 5 MG
5 TABLET ORAL 3 TIMES DAILY PRN
Qty: 20 TABLET | Refills: 1 | Status: SHIPPED | OUTPATIENT
Start: 2019-01-29 | End: 2021-04-13

## 2019-01-29 NOTE — PROGRESS NOTES
Chief Complaint   Patient presents with   • Hypertension   • Back Pain     twisted sunday night       History of Present Illness   Scar Pérez is a 84 y.o. male presents for a follow up evaluation with an acute need. Patient has hypertension. bp has been normotensive at home and at cardiac rehab. He attends this 3 days weekly and is able to fully participate in the program. He has noted some back pain recently. He notes improvement w/ warm water in the shower and as needed icy hot. Pain started about 3-4 months ago when he was bending over. This started again recently. Pain improving now. But often will last 3-4 days in nature. He reports that he can still sleep but it does create considerable discomfort when moving. Avoids nsaids. Tylenol with limited benefit.   [facility]. [facility] has cad. No chest pain at this itme.       The following portions of the patient's history were reviewed and updated as appropriate: allergies, current medications, past family history, past medical history, past social history, past surgical history and problem list.  Current Outpatient Medications on File Prior to Visit   Medication Sig Dispense Refill   • aspirin 325 MG tablet Take 1 tablet by mouth daily.     • atorvastatin (LIPITOR) 20 MG tablet Take 20 mg by mouth daily.     • Cholecalciferol (VITAMIN D3) 5000 UNITS tablet Take by mouth.     • Cyanocobalamin (B-12) 1000 MCG capsule Take 1 capsule by mouth.     • fluocinolone (SYNALAR) 0.01 % external solution Apply topically. APPLY TO AFFECTED AREA AS DIRECTED     • hydrochlorothiazide (MICROZIDE) 12.5 MG capsule Take 1 capsule by mouth Daily. 30 capsule 5   • hydrocortisone (ANUSOL-HC) 25 MG suppository Hydrocortisone Acetate 25 MG Rectal Suppository; Patient Sig: Hydrocortisone Acetate 25 MG Rectal Suppository INSERT ONE SUPPOSITORY RECTALLY TWICE DAILY AS NEEDED; 12; 4; 19-Nov-2013; Active     • metoprolol tartrate (LOPRESSOR) 100 MG tablet  Take 1 tablet by mouth 2 (two) times a day.     • niacin 500 MG tablet Take 1 tablet by mouth daily.     • nitroglycerin (NITROSTAT) 0.4 MG SL tablet Place 0.4 mg under the tongue.     • pantoprazole (PROTONIX) 40 MG EC tablet Take 1 tablet by mouth daily.     • trospium (SANCTURA) 20 MG tablet Take 20 mg by mouth.     • valsartan (DIOVAN) 160 MG tablet Take 1 tablet by mouth Daily. 90 tablet 2   • [DISCONTINUED] saccharomyces boulardii (FLORASTOR) 250 MG capsule Take 1 capsule by mouth 2 (Two) Times a Day. 20 capsule 0     No current facility-administered medications on file prior to visit.      Review of Systems   Constitutional: Negative.    HENT: Negative.    Eyes: Negative.    Respiratory: Negative.    Cardiovascular: Negative.    Gastrointestinal: Negative.    Endocrine: Negative.    Genitourinary: Negative.    Skin: Negative.    Allergic/Immunologic: Negative.    Neurological: Negative.    Hematological: Negative.    Psychiatric/Behavioral: Negative.        Objective   Physical Exam   Constitutional: He is oriented to person, place, and time. He appears well-developed and well-nourished.   HENT:   Head: Normocephalic and atraumatic.   Right Ear: External ear normal.   Left Ear: External ear normal.   Mouth/Throat: Oropharynx is clear and moist.   Eyes: EOM are normal. Pupils are equal, round, and reactive to light.   Neck: Normal range of motion. Neck supple.   Cardiovascular: Normal rate, regular rhythm and normal heart sounds.   Pulmonary/Chest: Effort normal and breath sounds normal.   Abdominal: Soft. Bowel sounds are normal.   Musculoskeletal:   Low back discomfort   Tender to palpation  Decreased flexion/ extension   Neurological: He is alert and oriented to person, place, and time.   Skin: Skin is warm and dry.   Psychiatric: He has a normal mood and affect. His behavior is normal. Judgment and thought content normal.   Nursing note and vitals reviewed.       /80   Pulse 83   Wt 114 kg (251 lb)    SpO2 98%   BMI 35.01 kg/m²     Assessment/Plan   Diagnoses and all orders for this visit:    Arteriosclerosis of coronary artery    Benign essential hypertension    Back strain, initial encounter    Other orders  -     cyclobenzaprine (FLEXERIL) 5 MG tablet; Take 1 tablet by mouth 3 (Three) Times a Day As Needed for Muscle Spasms.      Patient w/ cad. He will continue cardiac rehab routinely. He has hypertension and bp is well managed. Advised a low sodium diet. He has a back strain injury. He may continue icy hot and tylenol. Flexeril as needed especially when initially injurs. He is not to drive when taking. He will f/u routinely.

## 2019-02-04 ENCOUNTER — OFFICE VISIT (OUTPATIENT)
Dept: CARDIAC REHAB | Facility: HOSPITAL | Age: 84
End: 2019-02-04

## 2019-02-04 DIAGNOSIS — Z95.5 HISTORY OF PLACEMENT OF STENT IN LAD CORONARY ARTERY: Primary | ICD-10-CM

## 2019-02-06 ENCOUNTER — OFFICE VISIT (OUTPATIENT)
Dept: CARDIAC REHAB | Facility: HOSPITAL | Age: 84
End: 2019-02-06

## 2019-02-06 DIAGNOSIS — Z95.5 HISTORY OF PLACEMENT OF STENT IN LAD CORONARY ARTERY: Primary | ICD-10-CM

## 2019-02-08 ENCOUNTER — OFFICE VISIT (OUTPATIENT)
Dept: CARDIAC REHAB | Facility: HOSPITAL | Age: 84
End: 2019-02-08

## 2019-02-08 DIAGNOSIS — Z95.5 HISTORY OF PLACEMENT OF STENT IN LAD CORONARY ARTERY: Primary | ICD-10-CM

## 2019-02-11 ENCOUNTER — OFFICE VISIT (OUTPATIENT)
Dept: CARDIAC REHAB | Facility: HOSPITAL | Age: 84
End: 2019-02-11

## 2019-02-11 DIAGNOSIS — Z95.5 HISTORY OF PLACEMENT OF STENT IN LAD CORONARY ARTERY: Primary | ICD-10-CM

## 2019-02-13 ENCOUNTER — OFFICE VISIT (OUTPATIENT)
Dept: CARDIAC REHAB | Facility: HOSPITAL | Age: 84
End: 2019-02-13

## 2019-02-13 DIAGNOSIS — Z95.5 HISTORY OF PLACEMENT OF STENT IN LAD CORONARY ARTERY: Primary | ICD-10-CM

## 2019-02-18 ENCOUNTER — OFFICE VISIT (OUTPATIENT)
Dept: CARDIAC REHAB | Facility: HOSPITAL | Age: 84
End: 2019-02-18

## 2019-02-18 DIAGNOSIS — Z95.5 HISTORY OF PLACEMENT OF STENT IN LAD CORONARY ARTERY: Primary | ICD-10-CM

## 2019-03-11 ENCOUNTER — OFFICE VISIT (OUTPATIENT)
Dept: CARDIAC REHAB | Facility: HOSPITAL | Age: 84
End: 2019-03-11

## 2019-03-11 DIAGNOSIS — Z95.5 HISTORY OF PLACEMENT OF STENT IN LAD CORONARY ARTERY: Primary | ICD-10-CM

## 2019-03-13 ENCOUNTER — OFFICE VISIT (OUTPATIENT)
Dept: CARDIAC REHAB | Facility: HOSPITAL | Age: 84
End: 2019-03-13

## 2019-03-13 DIAGNOSIS — Z95.5 HISTORY OF PLACEMENT OF STENT IN LAD CORONARY ARTERY: Primary | ICD-10-CM

## 2019-03-15 ENCOUNTER — OFFICE VISIT (OUTPATIENT)
Dept: CARDIAC REHAB | Facility: HOSPITAL | Age: 84
End: 2019-03-15

## 2019-03-15 DIAGNOSIS — Z95.5 HISTORY OF PLACEMENT OF STENT IN LAD CORONARY ARTERY: Primary | ICD-10-CM

## 2019-03-18 ENCOUNTER — OFFICE VISIT (OUTPATIENT)
Dept: CARDIAC REHAB | Facility: HOSPITAL | Age: 84
End: 2019-03-18

## 2019-03-18 DIAGNOSIS — Z95.5 HISTORY OF PLACEMENT OF STENT IN LAD CORONARY ARTERY: Primary | ICD-10-CM

## 2019-03-20 ENCOUNTER — OFFICE VISIT (OUTPATIENT)
Dept: CARDIAC REHAB | Facility: HOSPITAL | Age: 84
End: 2019-03-20

## 2019-03-20 DIAGNOSIS — Z95.5 HISTORY OF PLACEMENT OF STENT IN LAD CORONARY ARTERY: Primary | ICD-10-CM

## 2019-03-22 ENCOUNTER — OFFICE VISIT (OUTPATIENT)
Dept: CARDIAC REHAB | Facility: HOSPITAL | Age: 84
End: 2019-03-22

## 2019-03-22 DIAGNOSIS — Z95.5 HISTORY OF PLACEMENT OF STENT IN LAD CORONARY ARTERY: Primary | ICD-10-CM

## 2019-03-25 ENCOUNTER — OFFICE VISIT (OUTPATIENT)
Dept: CARDIAC REHAB | Facility: HOSPITAL | Age: 84
End: 2019-03-25

## 2019-03-25 DIAGNOSIS — Z95.5 HISTORY OF PLACEMENT OF STENT IN LAD CORONARY ARTERY: Primary | ICD-10-CM

## 2019-03-29 ENCOUNTER — OFFICE VISIT (OUTPATIENT)
Dept: CARDIAC REHAB | Facility: HOSPITAL | Age: 84
End: 2019-03-29

## 2019-03-29 DIAGNOSIS — Z95.5 HISTORY OF PLACEMENT OF STENT IN LAD CORONARY ARTERY: Primary | ICD-10-CM

## 2019-04-01 ENCOUNTER — OFFICE VISIT (OUTPATIENT)
Dept: CARDIAC REHAB | Facility: HOSPITAL | Age: 84
End: 2019-04-01

## 2019-04-01 DIAGNOSIS — Z95.5 HISTORY OF PLACEMENT OF STENT IN LAD CORONARY ARTERY: Primary | ICD-10-CM

## 2019-04-03 ENCOUNTER — OFFICE VISIT (OUTPATIENT)
Dept: CARDIAC REHAB | Facility: HOSPITAL | Age: 84
End: 2019-04-03

## 2019-04-03 DIAGNOSIS — Z95.5 HISTORY OF PLACEMENT OF STENT IN LAD CORONARY ARTERY: Primary | ICD-10-CM

## 2019-04-05 ENCOUNTER — OFFICE VISIT (OUTPATIENT)
Dept: CARDIAC REHAB | Facility: HOSPITAL | Age: 84
End: 2019-04-05

## 2019-04-05 DIAGNOSIS — Z95.5 HISTORY OF PLACEMENT OF STENT IN LAD CORONARY ARTERY: Primary | ICD-10-CM

## 2019-04-08 ENCOUNTER — OFFICE VISIT (OUTPATIENT)
Dept: CARDIAC REHAB | Facility: HOSPITAL | Age: 84
End: 2019-04-08

## 2019-04-08 DIAGNOSIS — Z95.5 HISTORY OF PLACEMENT OF STENT IN LAD CORONARY ARTERY: Primary | ICD-10-CM

## 2019-04-10 ENCOUNTER — OFFICE VISIT (OUTPATIENT)
Dept: CARDIAC REHAB | Facility: HOSPITAL | Age: 84
End: 2019-04-10

## 2019-04-10 DIAGNOSIS — Z95.5 HISTORY OF PLACEMENT OF STENT IN LAD CORONARY ARTERY: Primary | ICD-10-CM

## 2019-04-12 ENCOUNTER — OFFICE VISIT (OUTPATIENT)
Dept: CARDIAC REHAB | Facility: HOSPITAL | Age: 84
End: 2019-04-12

## 2019-04-12 DIAGNOSIS — Z95.5 HISTORY OF PLACEMENT OF STENT IN LAD CORONARY ARTERY: Primary | ICD-10-CM

## 2019-04-15 ENCOUNTER — OFFICE VISIT (OUTPATIENT)
Dept: CARDIAC REHAB | Facility: HOSPITAL | Age: 84
End: 2019-04-15

## 2019-04-15 DIAGNOSIS — Z95.5 HISTORY OF PLACEMENT OF STENT IN LAD CORONARY ARTERY: Primary | ICD-10-CM

## 2019-04-17 ENCOUNTER — OFFICE VISIT (OUTPATIENT)
Dept: CARDIAC REHAB | Facility: HOSPITAL | Age: 84
End: 2019-04-17

## 2019-04-17 DIAGNOSIS — Z95.5 HISTORY OF PLACEMENT OF STENT IN LAD CORONARY ARTERY: Primary | ICD-10-CM

## 2019-04-19 ENCOUNTER — OFFICE VISIT (OUTPATIENT)
Dept: CARDIAC REHAB | Facility: HOSPITAL | Age: 84
End: 2019-04-19

## 2019-04-19 DIAGNOSIS — Z95.5 HISTORY OF PLACEMENT OF STENT IN LAD CORONARY ARTERY: Primary | ICD-10-CM

## 2019-04-22 ENCOUNTER — OFFICE VISIT (OUTPATIENT)
Dept: CARDIAC REHAB | Facility: HOSPITAL | Age: 84
End: 2019-04-22

## 2019-04-22 DIAGNOSIS — Z95.5 HISTORY OF PLACEMENT OF STENT IN LAD CORONARY ARTERY: Primary | ICD-10-CM

## 2019-04-24 ENCOUNTER — OFFICE VISIT (OUTPATIENT)
Dept: CARDIAC REHAB | Facility: HOSPITAL | Age: 84
End: 2019-04-24

## 2019-04-24 DIAGNOSIS — Z95.5 HISTORY OF PLACEMENT OF STENT IN LAD CORONARY ARTERY: Primary | ICD-10-CM

## 2019-04-26 ENCOUNTER — OFFICE VISIT (OUTPATIENT)
Dept: CARDIAC REHAB | Facility: HOSPITAL | Age: 84
End: 2019-04-26

## 2019-04-26 DIAGNOSIS — Z95.5 HISTORY OF PLACEMENT OF STENT IN LAD CORONARY ARTERY: Primary | ICD-10-CM

## 2019-04-29 ENCOUNTER — OFFICE VISIT (OUTPATIENT)
Dept: CARDIAC REHAB | Facility: HOSPITAL | Age: 84
End: 2019-04-29

## 2019-04-29 DIAGNOSIS — Z95.5 HISTORY OF PLACEMENT OF STENT IN LAD CORONARY ARTERY: Primary | ICD-10-CM

## 2019-05-01 ENCOUNTER — OFFICE VISIT (OUTPATIENT)
Dept: CARDIAC REHAB | Facility: HOSPITAL | Age: 84
End: 2019-05-01

## 2019-05-01 DIAGNOSIS — Z95.5 HISTORY OF PLACEMENT OF STENT IN LAD CORONARY ARTERY: Primary | ICD-10-CM

## 2019-05-03 ENCOUNTER — OFFICE VISIT (OUTPATIENT)
Dept: CARDIAC REHAB | Facility: HOSPITAL | Age: 84
End: 2019-05-03

## 2019-05-03 DIAGNOSIS — Z95.5 HISTORY OF PLACEMENT OF STENT IN LAD CORONARY ARTERY: Primary | ICD-10-CM

## 2019-05-06 ENCOUNTER — OFFICE VISIT (OUTPATIENT)
Dept: CARDIAC REHAB | Facility: HOSPITAL | Age: 84
End: 2019-05-06

## 2019-05-06 DIAGNOSIS — Z95.5 HISTORY OF PLACEMENT OF STENT IN LAD CORONARY ARTERY: Primary | ICD-10-CM

## 2019-05-08 ENCOUNTER — OFFICE VISIT (OUTPATIENT)
Dept: CARDIAC REHAB | Facility: HOSPITAL | Age: 84
End: 2019-05-08

## 2019-05-08 DIAGNOSIS — Z95.5 HISTORY OF PLACEMENT OF STENT IN LAD CORONARY ARTERY: Primary | ICD-10-CM

## 2019-05-10 ENCOUNTER — OFFICE VISIT (OUTPATIENT)
Dept: CARDIAC REHAB | Facility: HOSPITAL | Age: 84
End: 2019-05-10

## 2019-05-10 DIAGNOSIS — Z95.5 HISTORY OF PLACEMENT OF STENT IN LAD CORONARY ARTERY: Primary | ICD-10-CM

## 2019-05-13 ENCOUNTER — OFFICE VISIT (OUTPATIENT)
Dept: CARDIAC REHAB | Facility: HOSPITAL | Age: 84
End: 2019-05-13

## 2019-05-13 DIAGNOSIS — Z95.5 HISTORY OF PLACEMENT OF STENT IN LAD CORONARY ARTERY: Primary | ICD-10-CM

## 2019-05-15 ENCOUNTER — OFFICE VISIT (OUTPATIENT)
Dept: CARDIAC REHAB | Facility: HOSPITAL | Age: 84
End: 2019-05-15

## 2019-05-15 DIAGNOSIS — Z95.5 HISTORY OF PLACEMENT OF STENT IN LAD CORONARY ARTERY: Primary | ICD-10-CM

## 2019-05-17 ENCOUNTER — OFFICE VISIT (OUTPATIENT)
Dept: CARDIAC REHAB | Facility: HOSPITAL | Age: 84
End: 2019-05-17

## 2019-05-17 DIAGNOSIS — Z95.5 HISTORY OF PLACEMENT OF STENT IN LAD CORONARY ARTERY: Primary | ICD-10-CM

## 2019-05-20 ENCOUNTER — OFFICE VISIT (OUTPATIENT)
Dept: CARDIAC REHAB | Facility: HOSPITAL | Age: 84
End: 2019-05-20

## 2019-05-20 DIAGNOSIS — Z95.5 HISTORY OF PLACEMENT OF STENT IN LAD CORONARY ARTERY: Primary | ICD-10-CM

## 2019-05-22 ENCOUNTER — OFFICE VISIT (OUTPATIENT)
Dept: CARDIAC REHAB | Facility: HOSPITAL | Age: 84
End: 2019-05-22

## 2019-05-22 DIAGNOSIS — Z95.5 HISTORY OF PLACEMENT OF STENT IN LAD CORONARY ARTERY: Primary | ICD-10-CM

## 2019-05-24 ENCOUNTER — OFFICE VISIT (OUTPATIENT)
Dept: CARDIAC REHAB | Facility: HOSPITAL | Age: 84
End: 2019-05-24

## 2019-05-24 DIAGNOSIS — Z95.5 HISTORY OF PLACEMENT OF STENT IN LAD CORONARY ARTERY: Primary | ICD-10-CM

## 2019-06-05 ENCOUNTER — OFFICE VISIT (OUTPATIENT)
Dept: CARDIAC REHAB | Facility: HOSPITAL | Age: 84
End: 2019-06-05

## 2019-06-05 DIAGNOSIS — Z95.5 HISTORY OF PLACEMENT OF STENT IN LAD CORONARY ARTERY: Primary | ICD-10-CM

## 2019-06-07 ENCOUNTER — OFFICE VISIT (OUTPATIENT)
Dept: CARDIAC REHAB | Facility: HOSPITAL | Age: 84
End: 2019-06-07

## 2019-06-07 DIAGNOSIS — Z95.5 HISTORY OF PLACEMENT OF STENT IN LAD CORONARY ARTERY: Primary | ICD-10-CM

## 2019-06-10 ENCOUNTER — OFFICE VISIT (OUTPATIENT)
Dept: CARDIAC REHAB | Facility: HOSPITAL | Age: 84
End: 2019-06-10

## 2019-06-10 DIAGNOSIS — Z95.5 HISTORY OF PLACEMENT OF STENT IN LAD CORONARY ARTERY: Primary | ICD-10-CM

## 2019-06-12 ENCOUNTER — OFFICE VISIT (OUTPATIENT)
Dept: CARDIAC REHAB | Facility: HOSPITAL | Age: 84
End: 2019-06-12

## 2019-06-12 DIAGNOSIS — Z95.5 HISTORY OF PLACEMENT OF STENT IN LAD CORONARY ARTERY: Primary | ICD-10-CM

## 2019-06-14 ENCOUNTER — OFFICE VISIT (OUTPATIENT)
Dept: CARDIAC REHAB | Facility: HOSPITAL | Age: 84
End: 2019-06-14

## 2019-06-14 DIAGNOSIS — Z95.5 HISTORY OF PLACEMENT OF STENT IN LAD CORONARY ARTERY: Primary | ICD-10-CM

## 2019-06-19 ENCOUNTER — OFFICE VISIT (OUTPATIENT)
Dept: CARDIAC REHAB | Facility: HOSPITAL | Age: 84
End: 2019-06-19

## 2019-06-19 DIAGNOSIS — Z95.5 HISTORY OF PLACEMENT OF STENT IN LAD CORONARY ARTERY: Primary | ICD-10-CM

## 2019-06-21 ENCOUNTER — OFFICE VISIT (OUTPATIENT)
Dept: CARDIAC REHAB | Facility: HOSPITAL | Age: 84
End: 2019-06-21

## 2019-06-21 DIAGNOSIS — Z95.5 HISTORY OF PLACEMENT OF STENT IN LAD CORONARY ARTERY: Primary | ICD-10-CM

## 2019-06-24 ENCOUNTER — OFFICE VISIT (OUTPATIENT)
Dept: CARDIAC REHAB | Facility: HOSPITAL | Age: 84
End: 2019-06-24

## 2019-06-24 DIAGNOSIS — Z95.5 HISTORY OF PLACEMENT OF STENT IN LAD CORONARY ARTERY: Primary | ICD-10-CM

## 2019-06-26 ENCOUNTER — OFFICE VISIT (OUTPATIENT)
Dept: CARDIAC REHAB | Facility: HOSPITAL | Age: 84
End: 2019-06-26

## 2019-06-26 DIAGNOSIS — Z95.5 HISTORY OF PLACEMENT OF STENT IN LAD CORONARY ARTERY: Primary | ICD-10-CM

## 2019-06-28 ENCOUNTER — OFFICE VISIT (OUTPATIENT)
Dept: CARDIAC REHAB | Facility: HOSPITAL | Age: 84
End: 2019-06-28

## 2019-06-28 DIAGNOSIS — Z95.5 HISTORY OF PLACEMENT OF STENT IN LAD CORONARY ARTERY: Primary | ICD-10-CM

## 2019-07-01 ENCOUNTER — OFFICE VISIT (OUTPATIENT)
Dept: CARDIAC REHAB | Facility: HOSPITAL | Age: 84
End: 2019-07-01

## 2019-07-01 DIAGNOSIS — Z95.5 HISTORY OF PLACEMENT OF STENT IN LAD CORONARY ARTERY: Primary | ICD-10-CM

## 2019-07-03 ENCOUNTER — OFFICE VISIT (OUTPATIENT)
Dept: CARDIAC REHAB | Facility: HOSPITAL | Age: 84
End: 2019-07-03

## 2019-07-03 DIAGNOSIS — Z95.5 HISTORY OF PLACEMENT OF STENT IN LAD CORONARY ARTERY: Primary | ICD-10-CM

## 2019-07-05 ENCOUNTER — OFFICE VISIT (OUTPATIENT)
Dept: CARDIAC REHAB | Facility: HOSPITAL | Age: 84
End: 2019-07-05

## 2019-07-05 DIAGNOSIS — Z95.5 HISTORY OF PLACEMENT OF STENT IN LAD CORONARY ARTERY: Primary | ICD-10-CM

## 2019-07-08 ENCOUNTER — OFFICE VISIT (OUTPATIENT)
Dept: CARDIAC REHAB | Facility: HOSPITAL | Age: 84
End: 2019-07-08

## 2019-07-08 DIAGNOSIS — Z95.5 HISTORY OF PLACEMENT OF STENT IN LAD CORONARY ARTERY: Primary | ICD-10-CM

## 2019-07-10 ENCOUNTER — OFFICE VISIT (OUTPATIENT)
Dept: CARDIAC REHAB | Facility: HOSPITAL | Age: 84
End: 2019-07-10

## 2019-07-10 DIAGNOSIS — Z95.5 HISTORY OF PLACEMENT OF STENT IN LAD CORONARY ARTERY: Primary | ICD-10-CM

## 2019-07-12 ENCOUNTER — OFFICE VISIT (OUTPATIENT)
Dept: CARDIAC REHAB | Facility: HOSPITAL | Age: 84
End: 2019-07-12

## 2019-07-12 DIAGNOSIS — Z95.5 HISTORY OF PLACEMENT OF STENT IN LAD CORONARY ARTERY: Primary | ICD-10-CM

## 2019-07-15 ENCOUNTER — OFFICE VISIT (OUTPATIENT)
Dept: CARDIAC REHAB | Facility: HOSPITAL | Age: 84
End: 2019-07-15

## 2019-07-15 DIAGNOSIS — Z95.5 HISTORY OF PLACEMENT OF STENT IN LAD CORONARY ARTERY: Primary | ICD-10-CM

## 2019-07-17 ENCOUNTER — OFFICE VISIT (OUTPATIENT)
Dept: CARDIAC REHAB | Facility: HOSPITAL | Age: 84
End: 2019-07-17

## 2019-07-17 DIAGNOSIS — Z95.5 HISTORY OF PLACEMENT OF STENT IN LAD CORONARY ARTERY: Primary | ICD-10-CM

## 2019-07-18 DIAGNOSIS — Z00.00 HEALTHCARE MAINTENANCE: ICD-10-CM

## 2019-07-18 DIAGNOSIS — I10 ESSENTIAL HYPERTENSION: Primary | ICD-10-CM

## 2019-07-18 DIAGNOSIS — E78.5 HYPERLIPIDEMIA, UNSPECIFIED HYPERLIPIDEMIA TYPE: ICD-10-CM

## 2019-07-19 ENCOUNTER — OFFICE VISIT (OUTPATIENT)
Dept: CARDIAC REHAB | Facility: HOSPITAL | Age: 84
End: 2019-07-19

## 2019-07-19 DIAGNOSIS — Z95.5 HISTORY OF PLACEMENT OF STENT IN LAD CORONARY ARTERY: Primary | ICD-10-CM

## 2019-07-22 ENCOUNTER — OFFICE VISIT (OUTPATIENT)
Dept: CARDIAC REHAB | Facility: HOSPITAL | Age: 84
End: 2019-07-22

## 2019-07-22 DIAGNOSIS — Z95.5 HISTORY OF PLACEMENT OF STENT IN LAD CORONARY ARTERY: Primary | ICD-10-CM

## 2019-07-24 ENCOUNTER — OFFICE VISIT (OUTPATIENT)
Dept: CARDIAC REHAB | Facility: HOSPITAL | Age: 84
End: 2019-07-24

## 2019-07-24 DIAGNOSIS — Z95.5 HISTORY OF PLACEMENT OF STENT IN LAD CORONARY ARTERY: Primary | ICD-10-CM

## 2019-07-26 ENCOUNTER — OFFICE VISIT (OUTPATIENT)
Dept: CARDIAC REHAB | Facility: HOSPITAL | Age: 84
End: 2019-07-26

## 2019-07-26 DIAGNOSIS — Z95.5 HISTORY OF PLACEMENT OF STENT IN LAD CORONARY ARTERY: Primary | ICD-10-CM

## 2019-07-26 LAB
ALBUMIN SERPL-MCNC: 3.9 G/DL (ref 3.5–5.2)
ALBUMIN/GLOB SERPL: 1.3 G/DL
ALP SERPL-CCNC: 60 U/L (ref 39–117)
ALT SERPL-CCNC: 25 U/L (ref 1–41)
APPEARANCE UR: CLEAR
AST SERPL-CCNC: 19 U/L (ref 1–40)
BACTERIA #/AREA URNS HPF: NORMAL /HPF
BASOPHILS # BLD AUTO: 0.05 10*3/MM3 (ref 0–0.2)
BASOPHILS NFR BLD AUTO: 0.9 % (ref 0–1.5)
BILIRUB SERPL-MCNC: 0.7 MG/DL (ref 0.2–1.2)
BILIRUB UR QL STRIP: NEGATIVE
BUN SERPL-MCNC: 20 MG/DL (ref 8–23)
BUN/CREAT SERPL: 22 (ref 7–25)
CALCIUM SERPL-MCNC: 9.4 MG/DL (ref 8.6–10.5)
CHLORIDE SERPL-SCNC: 98 MMOL/L (ref 98–107)
CHOLEST SERPL-MCNC: 114 MG/DL (ref 0–200)
CO2 SERPL-SCNC: 28.6 MMOL/L (ref 22–29)
COLOR UR: YELLOW
CREAT SERPL-MCNC: 0.91 MG/DL (ref 0.76–1.27)
EOSINOPHIL # BLD AUTO: 0.25 10*3/MM3 (ref 0–0.4)
EOSINOPHIL NFR BLD AUTO: 4.6 % (ref 0.3–6.2)
EPI CELLS #/AREA URNS HPF: NORMAL /HPF
ERYTHROCYTE [DISTWIDTH] IN BLOOD BY AUTOMATED COUNT: 13.6 % (ref 12.3–15.4)
GLOBULIN SER CALC-MCNC: 2.9 GM/DL
GLUCOSE SERPL-MCNC: 105 MG/DL (ref 65–99)
GLUCOSE UR QL: NEGATIVE
HCT VFR BLD AUTO: 44.9 % (ref 37.5–51)
HDLC SERPL-MCNC: 33 MG/DL (ref 40–60)
HGB BLD-MCNC: 14.6 G/DL (ref 13–17.7)
HGB UR QL STRIP: NEGATIVE
IMM GRANULOCYTES # BLD AUTO: 0.01 10*3/MM3 (ref 0–0.05)
IMM GRANULOCYTES NFR BLD AUTO: 0.2 % (ref 0–0.5)
KETONES UR QL STRIP: NEGATIVE
LDLC SERPL CALC-MCNC: 70 MG/DL (ref 0–100)
LEUKOCYTE ESTERASE UR QL STRIP: NEGATIVE
LYMPHOCYTES # BLD AUTO: 1.32 10*3/MM3 (ref 0.7–3.1)
LYMPHOCYTES NFR BLD AUTO: 24.1 % (ref 19.6–45.3)
MCH RBC QN AUTO: 30.7 PG (ref 26.6–33)
MCHC RBC AUTO-ENTMCNC: 32.5 G/DL (ref 31.5–35.7)
MCV RBC AUTO: 94.5 FL (ref 79–97)
MICRO URNS: NORMAL
MICRO URNS: NORMAL
MONOCYTES # BLD AUTO: 0.68 10*3/MM3 (ref 0.1–0.9)
MONOCYTES NFR BLD AUTO: 12.4 % (ref 5–12)
MUCOUS THREADS URNS QL MICRO: PRESENT /HPF
NEUTROPHILS # BLD AUTO: 3.16 10*3/MM3 (ref 1.7–7)
NEUTROPHILS NFR BLD AUTO: 57.8 % (ref 42.7–76)
NITRITE UR QL STRIP: NEGATIVE
NRBC BLD AUTO-RTO: 0 /100 WBC (ref 0–0.2)
PH UR STRIP: 6 [PH] (ref 5–7.5)
PLATELET # BLD AUTO: 226 10*3/MM3 (ref 140–450)
POTASSIUM SERPL-SCNC: 4.5 MMOL/L (ref 3.5–5.2)
PROT SERPL-MCNC: 6.8 G/DL (ref 6–8.5)
PROT UR QL STRIP: NEGATIVE
RBC # BLD AUTO: 4.75 10*6/MM3 (ref 4.14–5.8)
RBC #/AREA URNS HPF: NORMAL /HPF
SODIUM SERPL-SCNC: 137 MMOL/L (ref 136–145)
SP GR UR: 1.02 (ref 1–1.03)
TRIGL SERPL-MCNC: 55 MG/DL (ref 0–150)
TSH SERPL DL<=0.005 MIU/L-ACNC: 2.66 MIU/ML (ref 0.27–4.2)
URINALYSIS REFLEX: NORMAL
UROBILINOGEN UR STRIP-MCNC: 1 MG/DL (ref 0.2–1)
VLDLC SERPL CALC-MCNC: 11 MG/DL
WBC # BLD AUTO: 5.47 10*3/MM3 (ref 3.4–10.8)
WBC #/AREA URNS HPF: NORMAL /HPF

## 2019-07-29 ENCOUNTER — OFFICE VISIT (OUTPATIENT)
Dept: INTERNAL MEDICINE | Facility: CLINIC | Age: 84
End: 2019-07-29

## 2019-07-29 ENCOUNTER — OFFICE VISIT (OUTPATIENT)
Dept: CARDIAC REHAB | Facility: HOSPITAL | Age: 84
End: 2019-07-29

## 2019-07-29 VITALS
SYSTOLIC BLOOD PRESSURE: 150 MMHG | WEIGHT: 246 LBS | OXYGEN SATURATION: 95 % | HEIGHT: 72 IN | BODY MASS INDEX: 33.32 KG/M2 | HEART RATE: 77 BPM | DIASTOLIC BLOOD PRESSURE: 88 MMHG

## 2019-07-29 DIAGNOSIS — M67.40 GANGLION CYST: ICD-10-CM

## 2019-07-29 DIAGNOSIS — E78.00 PURE HYPERCHOLESTEROLEMIA: ICD-10-CM

## 2019-07-29 DIAGNOSIS — I48.20 CHRONIC ATRIAL FIBRILLATION (HCC): ICD-10-CM

## 2019-07-29 DIAGNOSIS — Z95.5 HISTORY OF PLACEMENT OF STENT IN LAD CORONARY ARTERY: Primary | ICD-10-CM

## 2019-07-29 DIAGNOSIS — I25.10 ARTERIOSCLEROSIS OF CORONARY ARTERY: ICD-10-CM

## 2019-07-29 DIAGNOSIS — I10 BENIGN ESSENTIAL HYPERTENSION: ICD-10-CM

## 2019-07-29 DIAGNOSIS — Z00.00 HEALTHCARE MAINTENANCE: Primary | ICD-10-CM

## 2019-07-29 DIAGNOSIS — G47.33 OBSTRUCTIVE APNEA: ICD-10-CM

## 2019-07-29 DIAGNOSIS — R73.01 IMPAIRED FASTING GLUCOSE: ICD-10-CM

## 2019-07-29 PROCEDURE — G0439 PPPS, SUBSEQ VISIT: HCPCS | Performed by: INTERNAL MEDICINE

## 2019-07-29 PROCEDURE — 99213 OFFICE O/P EST LOW 20 MIN: CPT | Performed by: INTERNAL MEDICINE

## 2019-07-29 NOTE — PROGRESS NOTES
"Subjective   AWV  Atrial fibrillation  Cad  Anemia  ifg  Dyslipidemia  bettye    Scar Pérez is a 85 y.o. male who presents for an annual wellness visit, review of chronic issues and to discuss concerns. He reports that occasionally he will note some fatigue. Reports that it took him \"longer than it should have\" to paint his deck.  He is engaging in cardiac rehab 3 times a week w/ no diffiulty. bp is in goal range there. He has urinary frequency but this is managed at night w/  Med. He has not had any chest pains or palpitations. No dizziness. Pacer tested last week and it is firing appropriately. Labs reviewed and all at goal with mild increased glucose. Wife has started a low carb diet and he has reduced carbs w/ less bread and potatoes in his diet. BETTYE is treated successfully w/ CPAP qhs. Lipids are at a goal level. Notes left wrist has a nodule that is growing.   Rate is regulated.             Review of Systems   Constitutional: Negative.    HENT: Negative.         Dental q 6 month   Eyes: Negative.         Annual eye examinations   Respiratory: Negative.    Cardiovascular: Negative.    Gastrointestinal: Negative.    Endocrine: Negative.    Genitourinary: Negative.    Musculoskeletal: Negative.    Skin: Negative.    Allergic/Immunologic: Negative.    Neurological: Negative.    Hematological: Negative.    Psychiatric/Behavioral: Negative.        The following portions of the patient's history were reviewed and updated as appropriate: allergies, current medications, past family history, past medical history, past social history, past surgical history and problem list.     Patient Active Problem List   Diagnosis   • Absolute anemia   • A-fib (CMS/HCC)   • Arteriosclerosis of coronary artery   • Bleeding gastrointestinal   • Lipoma of skin and subcutaneous tissue   • Obstructive apnea   • Hyperkalemia   • Benign essential hypertension   • Pure hypercholesterolemia   • Impaired fasting glucose       Past Medical " History:   Diagnosis Date   • Abdominal aortic aneurysm (CMS/HCC)    • Benign essential hypertension    • Coronary atherosclerosis    • Esophageal reflux    • Hyperglycemia    • Hyperlipidemia     '   • Sick sinus syndrome (CMS/HCC)        Past Surgical History:   Procedure Laterality Date   • CARDIAC PACEMAKER PLACEMENT     • CARDIAC PACEMAKER PLACEMENT     • CORONARY ANGIOPLASTY     • CORONARY ANGIOPLASTY WITH STENT PLACEMENT     • OTHER SURGICAL HISTORY      CATARACT SURGERY   • OTHER SURGICAL HISTORY      ROTATOR CUFF REPAIR        Family History   Problem Relation Age of Onset   • Leukemia Father    • Heart disease Maternal Aunt    • Diabetes Daughter        Social History     Socioeconomic History   • Marital status:      Spouse name: Not on file   • Number of children: Not on file   • Years of education: Not on file   • Highest education level: Not on file   Tobacco Use   • Smoking status: Never Smoker   • Smokeless tobacco: Never Used   Substance and Sexual Activity   • Alcohol use: Yes     Comment: BEING A SOCIAL DRINKER   • Drug use: No       Current Outpatient Medications on File Prior to Visit   Medication Sig Dispense Refill   • aspirin 325 MG tablet Take 1 tablet by mouth daily.     • atorvastatin (LIPITOR) 20 MG tablet Take 20 mg by mouth daily.     • Cholecalciferol (VITAMIN D3) 5000 UNITS tablet Take by mouth.     • Cyanocobalamin (B-12) 1000 MCG capsule Take 1 capsule by mouth.     • cyclobenzaprine (FLEXERIL) 5 MG tablet Take 1 tablet by mouth 3 (Three) Times a Day As Needed for Muscle Spasms. 20 tablet 1   • fluocinolone (SYNALAR) 0.01 % external solution Apply topically. APPLY TO AFFECTED AREA AS DIRECTED     • hydrochlorothiazide (MICROZIDE) 12.5 MG capsule Take 1 capsule by mouth Daily. 30 capsule 5   • metoprolol tartrate (LOPRESSOR) 100 MG tablet Take 1 tablet by mouth 2 (two) times a day.     • niacin 500 MG tablet Take 1 tablet by mouth daily.     • nitroglycerin (NITROSTAT) 0.4 MG  "SL tablet Place 0.4 mg under the tongue.     • pantoprazole (PROTONIX) 40 MG EC tablet Take 1 tablet by mouth daily.     • trospium (SANCTURA) 20 MG tablet Take 20 mg by mouth.     • valsartan (DIOVAN) 160 MG tablet Take 1 tablet by mouth Daily. 90 tablet 2   • hydrocortisone (ANUSOL-HC) 25 MG suppository Hydrocortisone Acetate 25 MG Rectal Suppository; Patient Sig: Hydrocortisone Acetate 25 MG Rectal Suppository INSERT ONE SUPPOSITORY RECTALLY TWICE DAILY AS NEEDED; 12; 4; 19-Nov-2013; Active       No current facility-administered medications on file prior to visit.        Allergies   Allergen Reactions   • Iodine        Immunization History   Administered Date(s) Administered   • Flu Vaccine High Dose PF 65YR+ 10/01/2018   • Hepatitis A 07/07/2018, 10/19/2018   • Influenza TIV (IM) 09/11/2015   • Influenza, Unspecified 01/01/2010, 09/30/2013, 09/25/2014   • Pneumococcal Conjugate 13-Valent (PCV13) 04/25/2016   • Pneumococcal Polysaccharide (PPSV23) 10/03/2007, 09/30/2013   • Tdap 10/03/2007, 07/31/2017       Objective     /88   Pulse 77   Ht 181.6 cm (71.5\")   Wt 112 kg (246 lb)   SpO2 95%   BMI 33.83 kg/m²     Physical Exam   Constitutional: He is oriented to person, place, and time. He appears well-developed and well-nourished.   HENT:   Head: Normocephalic and atraumatic.   Right Ear: External ear normal.   Left Ear: External ear normal.   Nose: Nose normal.   Mouth/Throat: Oropharynx is clear and moist.   Eyes: Conjunctivae and EOM are normal. Pupils are equal, round, and reactive to light.   Neck: Normal range of motion. Neck supple.   Cardiovascular: Normal rate, regular rhythm and normal heart sounds.   varicosity   Pulmonary/Chest: Effort normal and breath sounds normal.   Abdominal: Soft. Bowel sounds are normal.   Musculoskeletal: Normal range of motion.   Neurological: He is alert and oriented to person, place, and time.   Skin: Skin is warm and dry.   Psychiatric: He has a normal mood and " affect. His behavior is normal. Judgment and thought content normal.   Nursing note and vitals reviewed.      Assessment/Plan   Scar was seen today for annual exam.    Diagnoses and all orders for this visit:    Healthcare maintenance    Ganglion cyst  -     Ambulatory Referral to Hand Surgery    Arteriosclerosis of coronary artery    Benign essential hypertension    Pure hypercholesterolemia    Obstructive apnea    Impaired fasting glucose    Chronic atrial fibrillation (CMS/HCC)        Discussion    AWV.      Direct observation of cognitive ability was evaluated.  Patient was given health advice or handouts on the following:  nutrition, fall prevention, exercise, weight management. Current on eyecare, dental, and vaccinations. He will maintain a low sodium and low carbohydrate healthy diet. Continue 3 times weekly cardiac rehab. He will wear cpap hs and will montior bp through rehab. Low fat diet. Will follow up in 6 months or prn.                 Future Appointments   Date Time Provider Department Center   7/31/2019  7:30 AM [facility]   8/2/2019  7:30 AM [facility]AC [facility]   8/5/2019  7:30 AM [facility]AC [facility]   8/7/2019  7:30 AM [facility]   8/9/2019  7:30 AM [facility]   8/12/2019  7:30 AM [facility]AC [facility]   8/14/2019  7:30 AM [facility]   8/16/2019  7:30 AM [facility]AC [facility]   8/19/2019  7:30 AM [facility]   8/21/2019  7:30 AM [facility]   8/23/2019  7:30 AM [facility]   8/26/2019  7:30 AM [facility]   8/28/2019  7:30 AM [facility]   8/30/2019  7:30 AM [facility]   9/4/2019  7:30 AM [facility]AC [facility]   9/6/2019  7:30 AM [facility]   9/9/2019  7:30 AM [facility] - CARDIAC  REHAB  [facility]   9/11/2019  7:30 AM [facility]ARDIAC RE[facility]  [facility]   9/13/2019  7:30 AM [facility] - CARDIAC RE[facility]  [facility]   9/16/2019  7:30 AM [facility]IAC RE[facility]  [facility]   9/18/2019  7:30 AM [facility] - CARDIAC REH[facility]  [facility]   9/20/2019  7:30 AM [facility] - CARDIAC RE[facility]  [facility]   9/23/2019  7:30 AM [facility] - CARDIAC REH[facility]  [facility]   9/25/2019  7:30 AM [facility] - CARDIAC REH[facility]  [facility]   9/27/2019  7:30 AM [facility] - CARDIAC REHAB  [facility]   9/30/2019  7:30 AM [facility] - CARDIAC REH[facility]  [facility]   10/2/2019  7:30 AM [facility] - CARDIAC REHAB  [facility]   10/4/2019  7:30 AM [facility] - CARDIAC REHAB  [facility]   10/7/2019  7:30 AM [facility] - CARDIAC REH[facility]  [facility]   10/9/2019  7:30 AM [facility] - CARDIAC REH[facility]  [facility]   10/11/2019  7:30 AM [facility] - CARDIAC REH[facility]  [facility]   10/14/2019  7:30 AM [facility] - CARDIAC REHAB  [facility]   10/16/2019  7:30 AM [facility] - CARDIAC REHAB  [facility]   10/18/2019  7:30 AM [facility] - CARDIAC REHAB  [facility]   10/21/2019  7:30 AM [facility] - CARDIAC REHAB  [facility]   10/23/2019  7:30 AM [facility] - CARDIAC REHAB  [facility]   10/25/2019  7:30 AM [facility] - CARDIAC REHAB  [facility]   10/28/2019  7:30 AM [facility] - CARDIAC REHAB  [facility]   10/30/2019  7:30 AM [facility] - CARDIAC REHAB  [facility]   11/1/2019  7:30 AM [facility] - CARDIAC REHAB  [facility]   11/4/2019  7:30 AM [facility] - CARDIAC REHAB  [facility]   11/6/2019  7:30 AM [facility] - CARDIAC REHAB  [facility]   11/8/2019  7:30 AM [facility] - CARDIAC REHAB  [facility]   11/11/2019  7:30 AM [facility] - CARDIAC REHAB  [facility]   11/13/2019  7:30 AM [facility] - CARDIAC REHAB  [facility]   11/15/2019  7:30 AM [facility] - CARDIAC REH[facility]  [facility]   11/18/2019  7:30 AM [facility] - CARDIAC REHAB  [facility]   11/20/2019  7:30 AM [facility] - CARDIAC REHAB  [facility]   11/22/2019  7:30 AM [facility] - CARDIAC REHAB  [facility]   11/25/2019  7:30 AM [facility] - CARDIAC REHAB  [facility]   11/27/2019  7:30 AM [facility] - CARDIAC REHAB  [facility]    11/29/2019  7:30 AM [facility] - CARDIAC REHAB  [facility]   12/2/2019  7:30 AM [facility] - CARDIAC REHAB  [facility]   12/4/2019  7:30 AM [facility] - CARDIAC REHAB  [facility]   12/6/2019  7:30 AM [facility] - C[facility]AC RE[facility]AB  [facility]   12/9/2019  7:30 AM [facility] - CARDIAC REHAB  [facility]   12/11/2019  7:30 AM [facility] - CARDIAC REHAB  [facility]

## 2019-07-31 ENCOUNTER — OFFICE VISIT (OUTPATIENT)
Dept: CARDIAC REHAB | Facility: HOSPITAL | Age: 84
End: 2019-07-31

## 2019-07-31 DIAGNOSIS — Z95.5 HISTORY OF PLACEMENT OF STENT IN LAD CORONARY ARTERY: Primary | ICD-10-CM

## 2019-08-02 ENCOUNTER — OFFICE VISIT (OUTPATIENT)
Dept: CARDIAC REHAB | Facility: HOSPITAL | Age: 84
End: 2019-08-02

## 2019-08-02 DIAGNOSIS — Z95.5 HISTORY OF PLACEMENT OF STENT IN LAD CORONARY ARTERY: Primary | ICD-10-CM

## 2019-08-05 ENCOUNTER — OFFICE VISIT (OUTPATIENT)
Dept: CARDIAC REHAB | Facility: HOSPITAL | Age: 84
End: 2019-08-05

## 2019-08-05 DIAGNOSIS — Z95.5 HISTORY OF PLACEMENT OF STENT IN LAD CORONARY ARTERY: Primary | ICD-10-CM

## 2019-08-07 ENCOUNTER — OFFICE VISIT (OUTPATIENT)
Dept: CARDIAC REHAB | Facility: HOSPITAL | Age: 84
End: 2019-08-07

## 2019-08-07 DIAGNOSIS — Z95.5 HISTORY OF PLACEMENT OF STENT IN LAD CORONARY ARTERY: Primary | ICD-10-CM

## 2019-08-09 ENCOUNTER — OFFICE VISIT (OUTPATIENT)
Dept: CARDIAC REHAB | Facility: HOSPITAL | Age: 84
End: 2019-08-09

## 2019-08-09 DIAGNOSIS — Z95.5 HISTORY OF PLACEMENT OF STENT IN LAD CORONARY ARTERY: Primary | ICD-10-CM

## 2019-08-12 ENCOUNTER — OFFICE VISIT (OUTPATIENT)
Dept: CARDIAC REHAB | Facility: HOSPITAL | Age: 84
End: 2019-08-12

## 2019-08-12 DIAGNOSIS — Z95.5 HISTORY OF PLACEMENT OF STENT IN LAD CORONARY ARTERY: Primary | ICD-10-CM

## 2019-08-14 ENCOUNTER — OFFICE VISIT (OUTPATIENT)
Dept: CARDIAC REHAB | Facility: HOSPITAL | Age: 84
End: 2019-08-14

## 2019-08-14 DIAGNOSIS — Z95.5 HISTORY OF PLACEMENT OF STENT IN LAD CORONARY ARTERY: Primary | ICD-10-CM

## 2019-08-16 ENCOUNTER — OFFICE VISIT (OUTPATIENT)
Dept: CARDIAC REHAB | Facility: HOSPITAL | Age: 84
End: 2019-08-16

## 2019-08-16 DIAGNOSIS — Z95.5 HISTORY OF PLACEMENT OF STENT IN LAD CORONARY ARTERY: Primary | ICD-10-CM

## 2019-08-19 ENCOUNTER — OFFICE VISIT (OUTPATIENT)
Dept: CARDIAC REHAB | Facility: HOSPITAL | Age: 84
End: 2019-08-19

## 2019-08-19 DIAGNOSIS — Z95.5 HISTORY OF PLACEMENT OF STENT IN LAD CORONARY ARTERY: Primary | ICD-10-CM

## 2019-08-21 ENCOUNTER — OFFICE VISIT (OUTPATIENT)
Dept: CARDIAC REHAB | Facility: HOSPITAL | Age: 84
End: 2019-08-21

## 2019-08-21 DIAGNOSIS — Z95.5 HISTORY OF PLACEMENT OF STENT IN LAD CORONARY ARTERY: Primary | ICD-10-CM

## 2019-08-23 ENCOUNTER — OFFICE VISIT (OUTPATIENT)
Dept: CARDIAC REHAB | Facility: HOSPITAL | Age: 84
End: 2019-08-23

## 2019-08-23 DIAGNOSIS — Z95.5 HISTORY OF PLACEMENT OF STENT IN LAD CORONARY ARTERY: Primary | ICD-10-CM

## 2019-08-26 ENCOUNTER — OFFICE VISIT (OUTPATIENT)
Dept: CARDIAC REHAB | Facility: HOSPITAL | Age: 84
End: 2019-08-26

## 2019-08-26 DIAGNOSIS — Z95.5 HISTORY OF PLACEMENT OF STENT IN LAD CORONARY ARTERY: Primary | ICD-10-CM

## 2019-08-28 ENCOUNTER — OFFICE VISIT (OUTPATIENT)
Dept: CARDIAC REHAB | Facility: HOSPITAL | Age: 84
End: 2019-08-28

## 2019-08-28 DIAGNOSIS — Z95.5 HISTORY OF PLACEMENT OF STENT IN LAD CORONARY ARTERY: Primary | ICD-10-CM

## 2019-08-30 ENCOUNTER — OFFICE VISIT (OUTPATIENT)
Dept: CARDIAC REHAB | Facility: HOSPITAL | Age: 84
End: 2019-08-30

## 2019-08-30 DIAGNOSIS — Z95.5 HISTORY OF PLACEMENT OF STENT IN LAD CORONARY ARTERY: Primary | ICD-10-CM

## 2019-09-04 ENCOUNTER — OFFICE VISIT (OUTPATIENT)
Dept: CARDIAC REHAB | Facility: HOSPITAL | Age: 84
End: 2019-09-04

## 2019-09-04 DIAGNOSIS — Z95.5 HISTORY OF PLACEMENT OF STENT IN LAD CORONARY ARTERY: Primary | ICD-10-CM

## 2019-09-09 ENCOUNTER — OFFICE VISIT (OUTPATIENT)
Dept: CARDIAC REHAB | Facility: HOSPITAL | Age: 84
End: 2019-09-09

## 2019-09-09 DIAGNOSIS — Z95.5 HISTORY OF PLACEMENT OF STENT IN LAD CORONARY ARTERY: Primary | ICD-10-CM

## 2019-09-11 ENCOUNTER — OFFICE VISIT (OUTPATIENT)
Dept: CARDIAC REHAB | Facility: HOSPITAL | Age: 84
End: 2019-09-11

## 2019-09-11 DIAGNOSIS — Z95.5 HISTORY OF PLACEMENT OF STENT IN LAD CORONARY ARTERY: Primary | ICD-10-CM

## 2019-09-13 ENCOUNTER — OFFICE VISIT (OUTPATIENT)
Dept: CARDIAC REHAB | Facility: HOSPITAL | Age: 84
End: 2019-09-13

## 2019-09-13 DIAGNOSIS — Z95.5 HISTORY OF PLACEMENT OF STENT IN LAD CORONARY ARTERY: Primary | ICD-10-CM

## 2019-09-16 ENCOUNTER — OFFICE VISIT (OUTPATIENT)
Dept: CARDIAC REHAB | Facility: HOSPITAL | Age: 84
End: 2019-09-16

## 2019-09-16 DIAGNOSIS — Z95.5 HISTORY OF PLACEMENT OF STENT IN LAD CORONARY ARTERY: Primary | ICD-10-CM

## 2019-10-02 ENCOUNTER — OFFICE VISIT (OUTPATIENT)
Dept: CARDIAC REHAB | Facility: HOSPITAL | Age: 84
End: 2019-10-02

## 2019-10-02 DIAGNOSIS — Z95.5 HISTORY OF PLACEMENT OF STENT IN LAD CORONARY ARTERY: Primary | ICD-10-CM

## 2019-10-04 ENCOUNTER — OFFICE VISIT (OUTPATIENT)
Dept: CARDIAC REHAB | Facility: HOSPITAL | Age: 84
End: 2019-10-04

## 2019-10-04 DIAGNOSIS — Z95.5 HISTORY OF PLACEMENT OF STENT IN LAD CORONARY ARTERY: Primary | ICD-10-CM

## 2019-10-07 ENCOUNTER — OFFICE VISIT (OUTPATIENT)
Dept: CARDIAC REHAB | Facility: HOSPITAL | Age: 84
End: 2019-10-07

## 2019-10-07 DIAGNOSIS — Z95.5 HISTORY OF PLACEMENT OF STENT IN LAD CORONARY ARTERY: Primary | ICD-10-CM

## 2019-10-09 ENCOUNTER — OFFICE VISIT (OUTPATIENT)
Dept: CARDIAC REHAB | Facility: HOSPITAL | Age: 84
End: 2019-10-09

## 2019-10-09 DIAGNOSIS — Z95.5 HISTORY OF PLACEMENT OF STENT IN LAD CORONARY ARTERY: Primary | ICD-10-CM

## 2019-10-14 ENCOUNTER — OFFICE VISIT (OUTPATIENT)
Dept: CARDIAC REHAB | Facility: HOSPITAL | Age: 84
End: 2019-10-14

## 2019-10-14 DIAGNOSIS — Z95.5 HISTORY OF PLACEMENT OF STENT IN LAD CORONARY ARTERY: Primary | ICD-10-CM

## 2019-10-16 ENCOUNTER — OFFICE VISIT (OUTPATIENT)
Dept: CARDIAC REHAB | Facility: HOSPITAL | Age: 84
End: 2019-10-16

## 2019-10-16 DIAGNOSIS — Z95.5 HISTORY OF PLACEMENT OF STENT IN LAD CORONARY ARTERY: Primary | ICD-10-CM

## 2019-10-18 ENCOUNTER — OFFICE VISIT (OUTPATIENT)
Dept: CARDIAC REHAB | Facility: HOSPITAL | Age: 84
End: 2019-10-18

## 2019-10-18 DIAGNOSIS — Z95.5 HISTORY OF PLACEMENT OF STENT IN LAD CORONARY ARTERY: Primary | ICD-10-CM

## 2019-10-21 ENCOUNTER — OFFICE VISIT (OUTPATIENT)
Dept: CARDIAC REHAB | Facility: HOSPITAL | Age: 84
End: 2019-10-21

## 2019-10-21 DIAGNOSIS — Z95.5 HISTORY OF PLACEMENT OF STENT IN LAD CORONARY ARTERY: Primary | ICD-10-CM

## 2019-10-23 ENCOUNTER — OFFICE VISIT (OUTPATIENT)
Dept: CARDIAC REHAB | Facility: HOSPITAL | Age: 84
End: 2019-10-23

## 2019-10-23 DIAGNOSIS — Z95.5 HISTORY OF PLACEMENT OF STENT IN LAD CORONARY ARTERY: Primary | ICD-10-CM

## 2019-10-25 ENCOUNTER — OFFICE VISIT (OUTPATIENT)
Dept: CARDIAC REHAB | Facility: HOSPITAL | Age: 84
End: 2019-10-25

## 2019-10-25 DIAGNOSIS — Z95.5 HISTORY OF PLACEMENT OF STENT IN LAD CORONARY ARTERY: Primary | ICD-10-CM

## 2019-10-28 ENCOUNTER — OFFICE VISIT (OUTPATIENT)
Dept: CARDIAC REHAB | Facility: HOSPITAL | Age: 84
End: 2019-10-28

## 2019-10-28 DIAGNOSIS — Z95.5 HISTORY OF PLACEMENT OF STENT IN LAD CORONARY ARTERY: Primary | ICD-10-CM

## 2019-10-30 ENCOUNTER — OFFICE VISIT (OUTPATIENT)
Dept: CARDIAC REHAB | Facility: HOSPITAL | Age: 84
End: 2019-10-30

## 2019-10-30 DIAGNOSIS — Z95.5 HISTORY OF PLACEMENT OF STENT IN LAD CORONARY ARTERY: Primary | ICD-10-CM

## 2019-11-06 ENCOUNTER — OFFICE VISIT (OUTPATIENT)
Dept: CARDIAC REHAB | Facility: HOSPITAL | Age: 84
End: 2019-11-06

## 2019-11-06 DIAGNOSIS — Z95.5 HISTORY OF PLACEMENT OF STENT IN LAD CORONARY ARTERY: Primary | ICD-10-CM

## 2019-11-08 ENCOUNTER — OFFICE VISIT (OUTPATIENT)
Dept: CARDIAC REHAB | Facility: HOSPITAL | Age: 84
End: 2019-11-08

## 2019-11-08 DIAGNOSIS — Z95.5 HISTORY OF PLACEMENT OF STENT IN LAD CORONARY ARTERY: Primary | ICD-10-CM

## 2019-11-11 ENCOUNTER — OFFICE VISIT (OUTPATIENT)
Dept: CARDIAC REHAB | Facility: HOSPITAL | Age: 84
End: 2019-11-11

## 2019-11-11 DIAGNOSIS — Z95.5 HISTORY OF PLACEMENT OF STENT IN LAD CORONARY ARTERY: Primary | ICD-10-CM

## 2019-11-13 ENCOUNTER — OFFICE VISIT (OUTPATIENT)
Dept: CARDIAC REHAB | Facility: HOSPITAL | Age: 84
End: 2019-11-13

## 2019-11-13 DIAGNOSIS — Z95.5 HISTORY OF PLACEMENT OF STENT IN LAD CORONARY ARTERY: Primary | ICD-10-CM

## 2019-11-15 ENCOUNTER — OFFICE VISIT (OUTPATIENT)
Dept: CARDIAC REHAB | Facility: HOSPITAL | Age: 84
End: 2019-11-15

## 2019-11-15 DIAGNOSIS — Z95.5 HISTORY OF PLACEMENT OF STENT IN LAD CORONARY ARTERY: Primary | ICD-10-CM

## 2019-11-18 ENCOUNTER — OFFICE VISIT (OUTPATIENT)
Dept: CARDIAC REHAB | Facility: HOSPITAL | Age: 84
End: 2019-11-18

## 2019-11-18 DIAGNOSIS — Z95.5 HISTORY OF PLACEMENT OF STENT IN LAD CORONARY ARTERY: Primary | ICD-10-CM

## 2019-11-20 ENCOUNTER — OFFICE VISIT (OUTPATIENT)
Dept: CARDIAC REHAB | Facility: HOSPITAL | Age: 84
End: 2019-11-20

## 2019-11-20 DIAGNOSIS — Z95.5 HISTORY OF PLACEMENT OF STENT IN LAD CORONARY ARTERY: Primary | ICD-10-CM

## 2019-11-22 ENCOUNTER — OFFICE VISIT (OUTPATIENT)
Dept: CARDIAC REHAB | Facility: HOSPITAL | Age: 84
End: 2019-11-22

## 2019-11-22 DIAGNOSIS — Z95.5 HISTORY OF PLACEMENT OF STENT IN LAD CORONARY ARTERY: Primary | ICD-10-CM

## 2019-11-25 ENCOUNTER — OFFICE VISIT (OUTPATIENT)
Dept: CARDIAC REHAB | Facility: HOSPITAL | Age: 84
End: 2019-11-25

## 2019-11-25 DIAGNOSIS — Z95.5 HISTORY OF PLACEMENT OF STENT IN LAD CORONARY ARTERY: Primary | ICD-10-CM

## 2019-11-27 ENCOUNTER — OFFICE VISIT (OUTPATIENT)
Dept: CARDIAC REHAB | Facility: HOSPITAL | Age: 84
End: 2019-11-27

## 2019-11-27 DIAGNOSIS — Z95.5 HISTORY OF PLACEMENT OF STENT IN LAD CORONARY ARTERY: Primary | ICD-10-CM

## 2019-12-02 ENCOUNTER — OFFICE VISIT (OUTPATIENT)
Dept: CARDIAC REHAB | Facility: HOSPITAL | Age: 84
End: 2019-12-02

## 2019-12-02 DIAGNOSIS — Z95.5 HISTORY OF PLACEMENT OF STENT IN LAD CORONARY ARTERY: Primary | ICD-10-CM

## 2019-12-04 ENCOUNTER — OFFICE VISIT (OUTPATIENT)
Dept: CARDIAC REHAB | Facility: HOSPITAL | Age: 84
End: 2019-12-04

## 2019-12-04 DIAGNOSIS — Z95.5 HISTORY OF PLACEMENT OF STENT IN LAD CORONARY ARTERY: Primary | ICD-10-CM

## 2019-12-06 ENCOUNTER — OFFICE VISIT (OUTPATIENT)
Dept: CARDIAC REHAB | Facility: HOSPITAL | Age: 84
End: 2019-12-06

## 2019-12-06 DIAGNOSIS — Z95.5 HISTORY OF PLACEMENT OF STENT IN LAD CORONARY ARTERY: Primary | ICD-10-CM

## 2019-12-11 ENCOUNTER — TRANSCRIBE ORDERS (OUTPATIENT)
Dept: CARDIAC REHAB | Facility: HOSPITAL | Age: 84
End: 2019-12-11

## 2019-12-11 ENCOUNTER — OFFICE VISIT (OUTPATIENT)
Dept: CARDIAC REHAB | Facility: HOSPITAL | Age: 84
End: 2019-12-11

## 2019-12-11 DIAGNOSIS — Z95.5 HISTORY OF PLACEMENT OF STENT IN LAD CORONARY ARTERY: Primary | ICD-10-CM

## 2019-12-13 ENCOUNTER — OFFICE VISIT (OUTPATIENT)
Dept: CARDIAC REHAB | Facility: HOSPITAL | Age: 84
End: 2019-12-13

## 2019-12-13 DIAGNOSIS — Z95.5 HISTORY OF PLACEMENT OF STENT IN LAD CORONARY ARTERY: Primary | ICD-10-CM

## 2019-12-16 ENCOUNTER — OFFICE VISIT (OUTPATIENT)
Dept: CARDIAC REHAB | Facility: HOSPITAL | Age: 84
End: 2019-12-16

## 2019-12-16 DIAGNOSIS — Z95.5 HISTORY OF PLACEMENT OF STENT IN LAD CORONARY ARTERY: Primary | ICD-10-CM

## 2019-12-18 ENCOUNTER — OFFICE VISIT (OUTPATIENT)
Dept: CARDIAC REHAB | Facility: HOSPITAL | Age: 84
End: 2019-12-18

## 2019-12-18 DIAGNOSIS — Z95.5 HISTORY OF PLACEMENT OF STENT IN LAD CORONARY ARTERY: Primary | ICD-10-CM

## 2019-12-20 ENCOUNTER — OFFICE VISIT (OUTPATIENT)
Dept: CARDIAC REHAB | Facility: HOSPITAL | Age: 84
End: 2019-12-20

## 2019-12-20 DIAGNOSIS — Z95.5 HISTORY OF PLACEMENT OF STENT IN LAD CORONARY ARTERY: Primary | ICD-10-CM

## 2019-12-30 ENCOUNTER — OFFICE VISIT (OUTPATIENT)
Dept: CARDIAC REHAB | Facility: HOSPITAL | Age: 84
End: 2019-12-30

## 2019-12-30 DIAGNOSIS — Z95.5 HISTORY OF PLACEMENT OF STENT IN LAD CORONARY ARTERY: Primary | ICD-10-CM

## 2020-01-03 ENCOUNTER — OFFICE VISIT (OUTPATIENT)
Dept: CARDIAC REHAB | Facility: HOSPITAL | Age: 85
End: 2020-01-03

## 2020-01-03 DIAGNOSIS — Z95.5 HISTORY OF PLACEMENT OF STENT IN LAD CORONARY ARTERY: Primary | ICD-10-CM

## 2020-01-06 ENCOUNTER — OFFICE VISIT (OUTPATIENT)
Dept: CARDIAC REHAB | Facility: HOSPITAL | Age: 85
End: 2020-01-06

## 2020-01-06 DIAGNOSIS — Z95.5 HISTORY OF PLACEMENT OF STENT IN LAD CORONARY ARTERY: Primary | ICD-10-CM

## 2020-01-10 ENCOUNTER — OFFICE VISIT (OUTPATIENT)
Dept: CARDIAC REHAB | Facility: HOSPITAL | Age: 85
End: 2020-01-10

## 2020-01-10 DIAGNOSIS — Z95.5 HISTORY OF PLACEMENT OF STENT IN LAD CORONARY ARTERY: Primary | ICD-10-CM

## 2020-01-13 ENCOUNTER — OFFICE VISIT (OUTPATIENT)
Dept: CARDIAC REHAB | Facility: HOSPITAL | Age: 85
End: 2020-01-13

## 2020-01-13 DIAGNOSIS — Z95.5 HISTORY OF PLACEMENT OF STENT IN LAD CORONARY ARTERY: Primary | ICD-10-CM

## 2020-01-15 ENCOUNTER — OFFICE VISIT (OUTPATIENT)
Dept: CARDIAC REHAB | Facility: HOSPITAL | Age: 85
End: 2020-01-15

## 2020-01-15 DIAGNOSIS — Z95.5 HISTORY OF PLACEMENT OF STENT IN LAD CORONARY ARTERY: Primary | ICD-10-CM

## 2020-01-17 ENCOUNTER — OFFICE VISIT (OUTPATIENT)
Dept: CARDIAC REHAB | Facility: HOSPITAL | Age: 85
End: 2020-01-17

## 2020-01-17 DIAGNOSIS — Z95.5 HISTORY OF PLACEMENT OF STENT IN LAD CORONARY ARTERY: Primary | ICD-10-CM

## 2020-01-20 ENCOUNTER — OFFICE VISIT (OUTPATIENT)
Dept: CARDIAC REHAB | Facility: HOSPITAL | Age: 85
End: 2020-01-20

## 2020-01-20 DIAGNOSIS — Z95.5 HISTORY OF PLACEMENT OF STENT IN LAD CORONARY ARTERY: Primary | ICD-10-CM

## 2020-01-22 ENCOUNTER — OFFICE VISIT (OUTPATIENT)
Dept: CARDIAC REHAB | Facility: HOSPITAL | Age: 85
End: 2020-01-22

## 2020-01-22 DIAGNOSIS — Z95.5 HISTORY OF PLACEMENT OF STENT IN LAD CORONARY ARTERY: Primary | ICD-10-CM

## 2020-01-24 ENCOUNTER — OFFICE VISIT (OUTPATIENT)
Dept: CARDIAC REHAB | Facility: HOSPITAL | Age: 85
End: 2020-01-24

## 2020-01-24 DIAGNOSIS — Z95.5 HISTORY OF PLACEMENT OF STENT IN LAD CORONARY ARTERY: Primary | ICD-10-CM

## 2020-01-31 ENCOUNTER — OFFICE VISIT (OUTPATIENT)
Dept: CARDIAC REHAB | Facility: HOSPITAL | Age: 85
End: 2020-01-31

## 2020-01-31 DIAGNOSIS — Z95.5 HISTORY OF PLACEMENT OF STENT IN LAD CORONARY ARTERY: Primary | ICD-10-CM

## 2020-02-03 ENCOUNTER — OFFICE VISIT (OUTPATIENT)
Dept: CARDIAC REHAB | Facility: HOSPITAL | Age: 85
End: 2020-02-03

## 2020-02-03 DIAGNOSIS — Z95.5 HISTORY OF PLACEMENT OF STENT IN LAD CORONARY ARTERY: Primary | ICD-10-CM

## 2020-02-10 ENCOUNTER — OFFICE VISIT (OUTPATIENT)
Dept: CARDIAC REHAB | Facility: HOSPITAL | Age: 85
End: 2020-02-10

## 2020-02-10 DIAGNOSIS — Z95.5 HISTORY OF PLACEMENT OF STENT IN LAD CORONARY ARTERY: Primary | ICD-10-CM

## 2020-02-12 ENCOUNTER — OFFICE VISIT (OUTPATIENT)
Dept: CARDIAC REHAB | Facility: HOSPITAL | Age: 85
End: 2020-02-12

## 2020-02-12 DIAGNOSIS — Z95.5 HISTORY OF PLACEMENT OF STENT IN LAD CORONARY ARTERY: Primary | ICD-10-CM

## 2020-02-14 ENCOUNTER — OFFICE VISIT (OUTPATIENT)
Dept: CARDIAC REHAB | Facility: HOSPITAL | Age: 85
End: 2020-02-14

## 2020-02-14 DIAGNOSIS — Z95.5 HISTORY OF PLACEMENT OF STENT IN LAD CORONARY ARTERY: Primary | ICD-10-CM

## 2020-02-14 PROCEDURE — 93798 PHYS/QHP OP CAR RHAB W/ECG: CPT

## 2020-02-17 ENCOUNTER — OFFICE VISIT (OUTPATIENT)
Dept: CARDIAC REHAB | Facility: HOSPITAL | Age: 85
End: 2020-02-17

## 2020-02-17 DIAGNOSIS — Z95.5 HISTORY OF PLACEMENT OF STENT IN LAD CORONARY ARTERY: Primary | ICD-10-CM

## 2020-02-19 ENCOUNTER — OFFICE VISIT (OUTPATIENT)
Dept: CARDIAC REHAB | Facility: HOSPITAL | Age: 85
End: 2020-02-19

## 2020-02-19 DIAGNOSIS — Z95.5 HISTORY OF PLACEMENT OF STENT IN LAD CORONARY ARTERY: Primary | ICD-10-CM

## 2020-02-21 ENCOUNTER — OFFICE VISIT (OUTPATIENT)
Dept: CARDIAC REHAB | Facility: HOSPITAL | Age: 85
End: 2020-02-21

## 2020-02-21 DIAGNOSIS — Z95.5 HISTORY OF PLACEMENT OF STENT IN LAD CORONARY ARTERY: Primary | ICD-10-CM

## 2020-02-24 ENCOUNTER — OFFICE VISIT (OUTPATIENT)
Dept: CARDIAC REHAB | Facility: HOSPITAL | Age: 85
End: 2020-02-24

## 2020-02-24 DIAGNOSIS — Z95.5 HISTORY OF PLACEMENT OF STENT IN LAD CORONARY ARTERY: Primary | ICD-10-CM

## 2020-03-02 ENCOUNTER — OFFICE VISIT (OUTPATIENT)
Dept: CARDIAC REHAB | Facility: HOSPITAL | Age: 85
End: 2020-03-02

## 2020-03-02 DIAGNOSIS — Z95.5 HISTORY OF PLACEMENT OF STENT IN LAD CORONARY ARTERY: Primary | ICD-10-CM

## 2020-03-04 ENCOUNTER — OFFICE VISIT (OUTPATIENT)
Dept: CARDIAC REHAB | Facility: HOSPITAL | Age: 85
End: 2020-03-04

## 2020-03-04 DIAGNOSIS — Z95.5 HISTORY OF PLACEMENT OF STENT IN LAD CORONARY ARTERY: Primary | ICD-10-CM

## 2020-03-04 PROCEDURE — 93798 PHYS/QHP OP CAR RHAB W/ECG: CPT

## 2020-03-06 ENCOUNTER — OFFICE VISIT (OUTPATIENT)
Dept: CARDIAC REHAB | Facility: HOSPITAL | Age: 85
End: 2020-03-06

## 2020-03-06 DIAGNOSIS — Z95.5 HISTORY OF PLACEMENT OF STENT IN LAD CORONARY ARTERY: Primary | ICD-10-CM

## 2020-03-09 ENCOUNTER — OFFICE VISIT (OUTPATIENT)
Dept: CARDIAC REHAB | Facility: HOSPITAL | Age: 85
End: 2020-03-09

## 2020-03-09 DIAGNOSIS — Z95.5 HISTORY OF PLACEMENT OF STENT IN LAD CORONARY ARTERY: Primary | ICD-10-CM

## 2020-03-11 ENCOUNTER — OFFICE VISIT (OUTPATIENT)
Dept: CARDIAC REHAB | Facility: HOSPITAL | Age: 85
End: 2020-03-11

## 2020-03-11 DIAGNOSIS — Z95.5 HISTORY OF PLACEMENT OF STENT IN LAD CORONARY ARTERY: Primary | ICD-10-CM

## 2020-03-13 ENCOUNTER — OFFICE VISIT (OUTPATIENT)
Dept: CARDIAC REHAB | Facility: HOSPITAL | Age: 85
End: 2020-03-13

## 2020-03-13 DIAGNOSIS — Z95.5 HISTORY OF PLACEMENT OF STENT IN LAD CORONARY ARTERY: Primary | ICD-10-CM

## 2020-06-09 ENCOUNTER — OFFICE VISIT (OUTPATIENT)
Dept: INTERNAL MEDICINE | Facility: CLINIC | Age: 85
End: 2020-06-09

## 2020-06-09 VITALS
RESPIRATION RATE: 18 BRPM | BODY MASS INDEX: 32.91 KG/M2 | HEART RATE: 74 BPM | WEIGHT: 243 LBS | DIASTOLIC BLOOD PRESSURE: 76 MMHG | HEIGHT: 72 IN | SYSTOLIC BLOOD PRESSURE: 150 MMHG | TEMPERATURE: 97.8 F

## 2020-06-09 DIAGNOSIS — I25.10 ARTERIOSCLEROSIS OF CORONARY ARTERY: Primary | ICD-10-CM

## 2020-06-09 DIAGNOSIS — R53.83 FATIGUE, UNSPECIFIED TYPE: ICD-10-CM

## 2020-06-09 DIAGNOSIS — R50.9 FEVER, UNSPECIFIED FEVER CAUSE: ICD-10-CM

## 2020-06-09 DIAGNOSIS — G47.33 OBSTRUCTIVE APNEA: ICD-10-CM

## 2020-06-09 DIAGNOSIS — I10 BENIGN ESSENTIAL HYPERTENSION: ICD-10-CM

## 2020-06-09 DIAGNOSIS — E78.00 PURE HYPERCHOLESTEROLEMIA: ICD-10-CM

## 2020-06-09 DIAGNOSIS — R50.9 FEVER, UNSPECIFIED FEVER CAUSE: Primary | ICD-10-CM

## 2020-06-09 DIAGNOSIS — I48.11 LONGSTANDING PERSISTENT ATRIAL FIBRILLATION (HCC): ICD-10-CM

## 2020-06-09 PROCEDURE — 99214 OFFICE O/P EST MOD 30 MIN: CPT | Performed by: INTERNAL MEDICINE

## 2020-06-09 NOTE — PROGRESS NOTES
"Chief Complaint   Patient presents with   • Hypertension   • Coronary Artery Disease   • Hyperlipidemia   • Sleep Apnea       History of Present Illness   Scar Pérez is a 85 y.o. male presents for follow up evaluation. He has cad and atrial fibrillation. Reports that mid may he had a day w/ fatigue and dyspnea. Discussed w/ pacemaker rep and was told \"something happened w/ my pacer\". He has hypertension. bp at home runs 110s-140/ 60s-70s. He notes that energy is gradually improving from mid June \"some\". He had a nuclear stress and echo. He met w/ sleep specialist and per patient his readings are at goal.   He reports a fever on arrival to [facility] on Saturday but this came down after he sat down.       The following portions of the patient's history were reviewed and updated as appropriate: allergies, current medications, past family history, past medical history, past social history, past surgical history and problem list.  Current Outpatient Medications on File Prior to Visit   Medication Sig Dispense Refill   • aspirin 325 MG tablet Take 1 tablet by mouth daily.     • atorvastatin (LIPITOR) 20 MG tablet Take 20 mg by mouth daily.     • Cholecalciferol (VITAMIN D3) 5000 UNITS tablet Take by mouth.     • Cyanocobalamin (B-12) 1000 MCG capsule Take 1 capsule by mouth.     • cyclobenzaprine (FLEXERIL) 5 MG tablet Take 1 tablet by mouth 3 (Three) Times a Day As Needed for Muscle Spasms. 20 tablet 1   • fluocinolone (SYNALAR) 0.01 % external solution Apply topically. APPLY TO AFFECTED AREA AS DIRECTED     • hydrochlorothiazide (MICROZIDE) 12.5 MG capsule Take 1 capsule by mouth Daily. 30 capsule 5   • hydrocortisone (ANUSOL-HC) 25 MG suppository Hydrocortisone Acetate 25 MG Rectal Suppository; Patient Sig: Hydrocortisone Acetate 25 MG Rectal Suppository INSERT ONE SUPPOSITORY RECTALLY TWICE DAILY AS NEEDED; 12; 4; 19-Nov-2013; Active     • metoprolol tartrate (LOPRESSOR) 100 MG tablet Take 1 tablet by mouth 2 " "(two) times a day.     • niacin 500 MG tablet Take 1 tablet by mouth daily.     • nitroglycerin (NITROSTAT) 0.4 MG SL tablet Place 0.4 mg under the tongue.     • pantoprazole (PROTONIX) 40 MG EC tablet Take 1 tablet by mouth daily.     • trospium (SANCTURA) 20 MG tablet Take 20 mg by mouth 2 (Two) Times a Day.     • valsartan (DIOVAN) 160 MG tablet Take 1 tablet by mouth Daily. 90 tablet 2     No current facility-administered medications on file prior to visit.      Review of Systems   Constitutional: Positive for fatigue.   HENT: Positive for rhinorrhea and sore throat.    Eyes: Negative.    Respiratory: Positive for shortness of breath.    Cardiovascular: Negative for chest pain and palpitations.   Gastrointestinal: Negative.    Endocrine: Negative.    Genitourinary: Negative.    Musculoskeletal: Positive for arthralgias.   Skin: Negative.    Allergic/Immunologic: Negative.    Neurological: Negative.    Hematological: Negative.    Psychiatric/Behavioral: Negative.        Objective   Physical Exam   Constitutional: He is oriented to person, place, and time. He appears well-developed and well-nourished.   HENT:   Head: Normocephalic and atraumatic.   Right Ear: External ear normal.   Left Ear: External ear normal.   Eyes: Pupils are equal, round, and reactive to light. EOM are normal.   Neck: Normal range of motion. Neck supple.   Abdominal: Soft. Bowel sounds are normal.   Musculoskeletal: Normal range of motion.   Neurological: He is alert and oriented to person, place, and time.   Skin: Skin is warm and dry.   Psychiatric: He has a normal mood and affect. His behavior is normal. Judgment and thought content normal.   Nursing note and vitals reviewed.       /76   Pulse 74   Temp 97.8 °F (36.6 °C) (Temporal)   Resp 18   Ht 181.6 cm (71.5\")   Wt 110 kg (243 lb)   BMI 33.42 kg/m²     Assessment/Plan   Diagnoses and all orders for this visit:    Arteriosclerosis of coronary artery  -     CBC & " Differential  -     Comprehensive Metabolic Panel  -     Lipid Panel With LDL / HDL Ratio  -     TSH  -     Urinalysis With Culture If Indicated -    Longstanding persistent atrial fibrillation (CMS/HCC)    Benign essential hypertension  -     CBC & Differential  -     Comprehensive Metabolic Panel  -     Lipid Panel With LDL / HDL Ratio  -     TSH  -     Urinalysis With Culture If Indicated -    Obstructive apnea    Pure hypercholesterolemia  -     CBC & Differential  -     Comprehensive Metabolic Panel  -     Lipid Panel With LDL / HDL Ratio  -     TSH  -     Urinalysis With Culture If Indicated -    Fever, unspecified fever cause  -     COVID-19,[facility] ROUTINE, NP/OP SWAB IN TRANSPORT MEDIA OR ESWAB 72 HR TAT - Swab, Nasopharynx    Fatigue, unspecified type  -     COVID-19,[facility] ROUTINE, NP/OP SWAB IN TRANSPORT MEDIA OR ESWAB 72 HR TAT - Swab, Nasopharynx      Patient w/ cad, afib, bettye, htn. Now w/ some dyspnea. He does have increased bp today. His home bp monitor actually reads higher than our office monitor so normotensive readings at home thought to be reliable. He had neg stress nuc and echo testing through cards and is using cpap appropriately.  Will test listed labs. Given fever, fatigue, and non spec symptoms will test for covid. He is encouraged to increas hydration and discouraged from social gatherings at this time. Will monitor bp and home and may need to increase valsartan to 320 mg. F/u here in 3 mo or prn.

## 2020-06-10 LAB
ALBUMIN SERPL-MCNC: 3.7 G/DL (ref 3.6–4.6)
ALBUMIN/GLOB SERPL: 1.5 {RATIO} (ref 1.2–2.2)
ALP SERPL-CCNC: 56 IU/L (ref 39–117)
ALT SERPL-CCNC: 18 IU/L (ref 0–44)
AST SERPL-CCNC: 19 IU/L (ref 0–40)
BASOPHILS # BLD AUTO: 0.1 X10E3/UL (ref 0–0.2)
BASOPHILS NFR BLD AUTO: 1 %
BILIRUB SERPL-MCNC: 0.7 MG/DL (ref 0–1.2)
BUN SERPL-MCNC: 13 MG/DL (ref 8–27)
BUN/CREAT SERPL: 15 (ref 10–24)
CALCIUM SERPL-MCNC: 9.2 MG/DL (ref 8.6–10.2)
CHLORIDE SERPL-SCNC: 102 MMOL/L (ref 96–106)
CHOLEST SERPL-MCNC: 99 MG/DL (ref 100–199)
CO2 SERPL-SCNC: 23 MMOL/L (ref 20–29)
CREAT SERPL-MCNC: 0.84 MG/DL (ref 0.76–1.27)
EOSINOPHIL # BLD AUTO: 0.3 X10E3/UL (ref 0–0.4)
EOSINOPHIL NFR BLD AUTO: 5 %
ERYTHROCYTE [DISTWIDTH] IN BLOOD BY AUTOMATED COUNT: 12.9 % (ref 11.6–15.4)
GLOBULIN SER CALC-MCNC: 2.4 G/DL (ref 1.5–4.5)
GLUCOSE SERPL-MCNC: 119 MG/DL (ref 65–99)
HCT VFR BLD AUTO: 41 % (ref 37.5–51)
HDLC SERPL-MCNC: 31 MG/DL
HGB BLD-MCNC: 13.7 G/DL (ref 13–17.7)
IMM GRANULOCYTES # BLD AUTO: 0 X10E3/UL (ref 0–0.1)
IMM GRANULOCYTES NFR BLD AUTO: 0 %
LDLC SERPL CALC-MCNC: 55 MG/DL (ref 0–99)
LDLC/HDLC SERPL: 1.8 RATIO (ref 0–3.6)
LYMPHOCYTES # BLD AUTO: 1 X10E3/UL (ref 0.7–3.1)
LYMPHOCYTES NFR BLD AUTO: 20 %
MCH RBC QN AUTO: 31.4 PG (ref 26.6–33)
MCHC RBC AUTO-ENTMCNC: 33.4 G/DL (ref 31.5–35.7)
MCV RBC AUTO: 94 FL (ref 79–97)
MONOCYTES # BLD AUTO: 0.6 X10E3/UL (ref 0.1–0.9)
MONOCYTES NFR BLD AUTO: 12 %
NEUTROPHILS # BLD AUTO: 3.3 X10E3/UL (ref 1.4–7)
NEUTROPHILS NFR BLD AUTO: 62 %
PLATELET # BLD AUTO: 199 X10E3/UL (ref 150–450)
POTASSIUM SERPL-SCNC: 4.1 MMOL/L (ref 3.5–5.2)
PROT SERPL-MCNC: 6.1 G/DL (ref 6–8.5)
RBC # BLD AUTO: 4.37 X10E6/UL (ref 4.14–5.8)
SODIUM SERPL-SCNC: 138 MMOL/L (ref 134–144)
TRIGL SERPL-MCNC: 64 MG/DL (ref 0–149)
TSH SERPL DL<=0.005 MIU/L-ACNC: 1.84 UIU/ML (ref 0.45–4.5)
UNABLE TO VOID: NORMAL
VLDLC SERPL CALC-MCNC: 13 MG/DL (ref 5–40)
WBC # BLD AUTO: 5.2 X10E3/UL (ref 3.4–10.8)

## 2020-09-09 ENCOUNTER — OFFICE VISIT (OUTPATIENT)
Dept: INTERNAL MEDICINE | Facility: CLINIC | Age: 85
End: 2020-09-09

## 2020-09-09 VITALS
HEART RATE: 82 BPM | SYSTOLIC BLOOD PRESSURE: 120 MMHG | HEIGHT: 72 IN | BODY MASS INDEX: 32.51 KG/M2 | WEIGHT: 240 LBS | DIASTOLIC BLOOD PRESSURE: 74 MMHG | TEMPERATURE: 98 F

## 2020-09-09 DIAGNOSIS — E78.00 PURE HYPERCHOLESTEROLEMIA: ICD-10-CM

## 2020-09-09 DIAGNOSIS — R73.01 IMPAIRED FASTING GLUCOSE: ICD-10-CM

## 2020-09-09 DIAGNOSIS — I10 BENIGN ESSENTIAL HYPERTENSION: ICD-10-CM

## 2020-09-09 DIAGNOSIS — I25.10 ARTERIOSCLEROSIS OF CORONARY ARTERY: Primary | ICD-10-CM

## 2020-09-09 PROCEDURE — 99214 OFFICE O/P EST MOD 30 MIN: CPT | Performed by: INTERNAL MEDICINE

## 2020-09-09 NOTE — PROGRESS NOTES
Chief Complaint   Patient presents with   • Hypertension   • Hyperlipidemia   • Coronary Artery Disease       History of Present Illness   Scar Pérez is a 86 y.o. male presents for routine follow up evaluation. In general he is doing well. He is noting some difficulty getting out of some chairs and needs to use his elbows to get up. He follows w/ ortho for knee OA. Had an injection in his right knee w/ some benefit. He has hypertension and hyperlipidemia. bp is normotensive and lipid level is at goal. He has lost 3 pounds from June and 9 pounds from last year. Eating at home more now and less out given pandemic. He is walking and working in the yard for fitness. Has cad. No recent cp or palpitations. History of AAA. He is to have vascular screening. This testing was delayed related to the pandemic. Elevated fbs in June. He reports repeat labs from that time at [facility].     The following portions of the patient's history were reviewed and updated as appropriate: allergies, current medications, past family history, past medical history, past social history, past surgical history and problem list.  Current Outpatient Medications on File Prior to Visit   Medication Sig Dispense Refill   • aspirin 325 MG tablet Take 1 tablet by mouth daily.     • atorvastatin (LIPITOR) 20 MG tablet Take 20 mg by mouth daily.     • Cholecalciferol (VITAMIN D3) 5000 UNITS tablet Take by mouth.     • Cyanocobalamin (B-12) 1000 MCG capsule Take 1 capsule by mouth.     • cyclobenzaprine (FLEXERIL) 5 MG tablet Take 1 tablet by mouth 3 (Three) Times a Day As Needed for Muscle Spasms. 20 tablet 1   • fluocinolone (SYNALAR) 0.01 % external solution Apply topically. APPLY TO AFFECTED AREA AS DIRECTED     • hydrochlorothiazide (MICROZIDE) 12.5 MG capsule Take 1 capsule by mouth Daily. 30 capsule 5   • hydrocortisone (ANUSOL-HC) 25 MG suppository Hydrocortisone Acetate 25 MG Rectal Suppository; Patient Sig: Hydrocortisone Acetate 25 MG Rectal  Suppository INSERT ONE SUPPOSITORY RECTALLY TWICE DAILY AS NEEDED; 12; 4; 19-Nov-2013; Active     • metoprolol tartrate (LOPRESSOR) 100 MG tablet Take 1 tablet by mouth 2 (two) times a day.     • niacin 500 MG tablet Take 1 tablet by mouth daily.     • nitroglycerin (NITROSTAT) 0.4 MG SL tablet Place 0.4 mg under the tongue.     • pantoprazole (PROTONIX) 40 MG EC tablet Take 1 tablet by mouth daily.     • trospium (SANCTURA) 20 MG tablet Take 20 mg by mouth 2 (Two) Times a Day.     • valsartan (DIOVAN) 160 MG tablet Take 1 tablet by mouth Daily. 90 tablet 2     No current facility-administered medications on file prior to visit.      Review of Systems   Constitutional: Negative.    HENT: Negative.    Eyes: Negative.    Respiratory: Negative.    Cardiovascular: Negative.    Gastrointestinal: Negative.    Endocrine: Negative.    Genitourinary: Negative.    Musculoskeletal:        Knee pain. stable   Skin: Negative.    Allergic/Immunologic: Negative.    Neurological: Negative.    Hematological: Negative.    Psychiatric/Behavioral: Negative.        Objective   Physical Exam   Constitutional: He is oriented to person, place, and time. He appears well-developed and well-nourished.   HENT:   Head: Normocephalic and atraumatic.   Right Ear: External ear normal.   Left Ear: External ear normal.   Mouth/Throat: Oropharynx is clear and moist.   Eyes: Pupils are equal, round, and reactive to light. EOM are normal.   Neck: Normal range of motion. Neck supple.   Cardiovascular: Normal rate, regular rhythm and normal heart sounds.   Pulmonary/Chest: Effort normal and breath sounds normal.   Abdominal: Soft. Bowel sounds are normal.   Musculoskeletal: Normal range of motion.   Neurological: He is alert and oriented to person, place, and time.   Skin: Skin is warm and dry.   Psychiatric: He has a normal mood and affect. His behavior is normal. Judgment and thought content normal.   Nursing note and vitals reviewed.       /74   " Pulse 82   Temp 98 °F (36.7 °C)   Ht 181.6 cm (71.5\")   Wt 109 kg (240 lb)   BMI 33.01 kg/m²     Assessment/Plan   Diagnoses and all orders for this visit:    Arteriosclerosis of coronary artery    Benign essential hypertension    Impaired fasting glucose        Patient with CAD. He will continue healthy fitness and a low carb/ clean diet. He has B knee oa. Advised patient to strengthen supporting musculature. Able to stand from seated independently in office today. Offered PT and he declines this. Will restart cardiac rehab once this is available. He will continue current meds for bp and lipid regulation. F/u in 6-8 months or prn.          "

## 2020-09-14 ENCOUNTER — TELEPHONE (OUTPATIENT)
Dept: INTERNAL MEDICINE | Facility: CLINIC | Age: 85
End: 2020-09-14

## 2021-03-09 ENCOUNTER — TELEPHONE (OUTPATIENT)
Dept: INTERNAL MEDICINE | Facility: CLINIC | Age: 86
End: 2021-03-09

## 2021-03-09 NOTE — TELEPHONE ENCOUNTER
----- Message from Marcelle Fields MD sent at 3/9/2021 12:15 PM EST -----  Please schedule patient for a hospital follow up  Thank you

## 2021-04-06 DIAGNOSIS — I10 BENIGN ESSENTIAL HYPERTENSION: ICD-10-CM

## 2021-04-06 DIAGNOSIS — R73.01 IMPAIRED FASTING GLUCOSE: ICD-10-CM

## 2021-04-06 DIAGNOSIS — E87.5 HYPERKALEMIA: ICD-10-CM

## 2021-04-06 DIAGNOSIS — E78.00 PURE HYPERCHOLESTEROLEMIA: ICD-10-CM

## 2021-04-06 DIAGNOSIS — Z00.00 HEALTHCARE MAINTENANCE: Primary | ICD-10-CM

## 2021-04-07 LAB
ALBUMIN SERPL-MCNC: 3.8 G/DL (ref 3.5–5.2)
ALBUMIN/GLOB SERPL: 1.7 G/DL
ALP SERPL-CCNC: 62 U/L (ref 39–117)
ALT SERPL-CCNC: 21 U/L (ref 1–41)
APPEARANCE UR: CLEAR
AST SERPL-CCNC: 15 U/L (ref 1–40)
BACTERIA #/AREA URNS HPF: NORMAL /HPF
BASOPHILS # BLD AUTO: 0.04 10*3/MM3 (ref 0–0.2)
BASOPHILS NFR BLD AUTO: 0.7 % (ref 0–1.5)
BILIRUB SERPL-MCNC: 0.8 MG/DL (ref 0–1.2)
BILIRUB UR QL STRIP: NEGATIVE
BUN SERPL-MCNC: 13 MG/DL (ref 8–23)
BUN/CREAT SERPL: 16.5 (ref 7–25)
CALCIUM SERPL-MCNC: 8.7 MG/DL (ref 8.6–10.5)
CHLORIDE SERPL-SCNC: 98 MMOL/L (ref 98–107)
CHOLEST SERPL-MCNC: 103 MG/DL (ref 0–200)
CO2 SERPL-SCNC: 27.5 MMOL/L (ref 22–29)
COLOR UR: YELLOW
CREAT SERPL-MCNC: 0.79 MG/DL (ref 0.76–1.27)
EOSINOPHIL # BLD AUTO: 0.34 10*3/MM3 (ref 0–0.4)
EOSINOPHIL NFR BLD AUTO: 5.6 % (ref 0.3–6.2)
EPI CELLS #/AREA URNS HPF: NORMAL /HPF (ref 0–10)
ERYTHROCYTE [DISTWIDTH] IN BLOOD BY AUTOMATED COUNT: 12.6 % (ref 12.3–15.4)
GLOBULIN SER CALC-MCNC: 2.3 GM/DL
GLUCOSE SERPL-MCNC: 91 MG/DL (ref 65–99)
GLUCOSE UR QL: NEGATIVE
HBA1C MFR BLD: 5.6 % (ref 4.8–5.6)
HCT VFR BLD AUTO: 41.7 % (ref 37.5–51)
HDLC SERPL-MCNC: 36 MG/DL (ref 40–60)
HGB BLD-MCNC: 14.4 G/DL (ref 13–17.7)
HGB UR QL STRIP: NEGATIVE
IMM GRANULOCYTES # BLD AUTO: 0.01 10*3/MM3 (ref 0–0.05)
IMM GRANULOCYTES NFR BLD AUTO: 0.2 % (ref 0–0.5)
KETONES UR QL STRIP: NEGATIVE
LDLC SERPL CALC-MCNC: 55 MG/DL (ref 0–100)
LEUKOCYTE ESTERASE UR QL STRIP: NEGATIVE
LYMPHOCYTES # BLD AUTO: 1.28 10*3/MM3 (ref 0.7–3.1)
LYMPHOCYTES NFR BLD AUTO: 21 % (ref 19.6–45.3)
MCH RBC QN AUTO: 31.8 PG (ref 26.6–33)
MCHC RBC AUTO-ENTMCNC: 34.5 G/DL (ref 31.5–35.7)
MCV RBC AUTO: 92.1 FL (ref 79–97)
MICRO URNS: NORMAL
MICRO URNS: NORMAL
MONOCYTES # BLD AUTO: 0.64 10*3/MM3 (ref 0.1–0.9)
MONOCYTES NFR BLD AUTO: 10.5 % (ref 5–12)
MUCOUS THREADS URNS QL MICRO: PRESENT /HPF
NEUTROPHILS # BLD AUTO: 3.79 10*3/MM3 (ref 1.7–7)
NEUTROPHILS NFR BLD AUTO: 62 % (ref 42.7–76)
NITRITE UR QL STRIP: NEGATIVE
NRBC BLD AUTO-RTO: 0 /100 WBC (ref 0–0.2)
PH UR STRIP: 6 [PH] (ref 5–7.5)
PLATELET # BLD AUTO: 196 10*3/MM3 (ref 140–450)
POTASSIUM SERPL-SCNC: 4.3 MMOL/L (ref 3.5–5.2)
PROT SERPL-MCNC: 6.1 G/DL (ref 6–8.5)
PROT UR QL STRIP: NEGATIVE
RBC # BLD AUTO: 4.53 10*6/MM3 (ref 4.14–5.8)
RBC #/AREA URNS HPF: NORMAL /HPF (ref 0–2)
SODIUM SERPL-SCNC: 136 MMOL/L (ref 136–145)
SP GR UR: 1.02 (ref 1–1.03)
TRIGL SERPL-MCNC: 51 MG/DL (ref 0–150)
URINALYSIS REFLEX: NORMAL
UROBILINOGEN UR STRIP-MCNC: 1 MG/DL (ref 0.2–1)
VLDLC SERPL CALC-MCNC: 12 MG/DL (ref 5–40)
WBC # BLD AUTO: 6.1 10*3/MM3 (ref 3.4–10.8)
WBC #/AREA URNS HPF: NORMAL /HPF (ref 0–5)

## 2021-04-13 ENCOUNTER — OFFICE VISIT (OUTPATIENT)
Dept: INTERNAL MEDICINE | Facility: CLINIC | Age: 86
End: 2021-04-13

## 2021-04-13 VITALS
DIASTOLIC BLOOD PRESSURE: 74 MMHG | BODY MASS INDEX: 33.05 KG/M2 | WEIGHT: 244 LBS | HEIGHT: 72 IN | HEART RATE: 63 BPM | SYSTOLIC BLOOD PRESSURE: 168 MMHG

## 2021-04-13 DIAGNOSIS — I25.10 ARTERIOSCLEROSIS OF CORONARY ARTERY: ICD-10-CM

## 2021-04-13 DIAGNOSIS — I48.11 LONGSTANDING PERSISTENT ATRIAL FIBRILLATION (HCC): ICD-10-CM

## 2021-04-13 DIAGNOSIS — Z00.00 HEALTHCARE MAINTENANCE: Primary | ICD-10-CM

## 2021-04-13 DIAGNOSIS — I49.5 SICK SINUS SYNDROME (HCC): ICD-10-CM

## 2021-04-13 DIAGNOSIS — I10 BENIGN ESSENTIAL HYPERTENSION: ICD-10-CM

## 2021-04-13 DIAGNOSIS — G47.33 OBSTRUCTIVE APNEA: ICD-10-CM

## 2021-04-13 PROCEDURE — G0439 PPPS, SUBSEQ VISIT: HCPCS | Performed by: INTERNAL MEDICINE

## 2021-04-13 PROCEDURE — 99214 OFFICE O/P EST MOD 30 MIN: CPT | Performed by: INTERNAL MEDICINE

## 2021-04-13 RX ORDER — HYDROCHLOROTHIAZIDE 12.5 MG/1
12.5 CAPSULE, GELATIN COATED ORAL DAILY
Qty: 90 CAPSULE | Refills: 2 | Status: SHIPPED | OUTPATIENT
Start: 2021-04-13 | End: 2022-10-31 | Stop reason: ALTCHOICE

## 2021-04-13 NOTE — PROGRESS NOTES
Subjective    Sick sinus syndrome  Cad  htn  hyperlip  bettye    Scar Pérez is a 86 y.o. male who presents for an annual wellness visit.  He is current on all healthcare maint including eye examinations, vaccinations, and cardiac testing. He follows at [facility] for some of his health maintenance. Patient has a cardiac pacer. He had a malfunction that required hospitalization and replacement of pacer. He feels much improved energy after this. He has BETTYE and wears CPAP every evening. He recently received new equipment and follows routine with sleep medicine. Lipids are well at goal level w ldl at 55. Fasting glucose and a1c are wnl. Reviewed cbc, cmp, lipid, u/a today.         He reports that physical and mental health is about the same as last year. However, feels improvement from 4 months ago. He is appropriately taking ASA.      Review of Systems   Constitutional: Negative.    HENT: Negative.         Some hearing impairment. Declines audiology referral   Eyes: Negative.    Respiratory: Negative.    Cardiovascular: Negative.    Gastrointestinal: Negative.    Endocrine: Negative.    Genitourinary: Negative.    Musculoskeletal: Negative.    Skin: Negative.    Allergic/Immunologic: Negative.    Neurological: Negative.    Hematological: Negative.    Psychiatric/Behavioral: Negative.        The following portions of the patient's history were reviewed and updated as appropriate: allergies, current medications, past family history, past medical history, past social history, past surgical history and problem list.     Patient Active Problem List   Diagnosis   • Absolute anemia   • A-fib (CMS/[facility])   • Arteriosclerosis of coronary artery   • Bleeding gastrointestinal   • Lipoma of skin and subcutaneous tissue   • Obstructive apnea   • Hyperkalemia   • Benign essential hypertension   • Pure hypercholesterolemia   • Impaired fasting glucose       Past Medical History:   Diagnosis Date   • Abdominal aortic aneurysm (CMS/[facility])     • Benign essential hypertension    • Coronary atherosclerosis    • Esophageal reflux    • Hyperglycemia    • Hyperlipidemia     '   • Sick sinus syndrome (CMS/HCC)        Past Surgical History:   Procedure Laterality Date   • CARDIAC PACEMAKER PLACEMENT     • CARDIAC PACEMAKER PLACEMENT     • CORONARY ANGIOPLASTY     • CORONARY ANGIOPLASTY WITH STENT PLACEMENT     • OTHER SURGICAL HISTORY      CATARACT SURGERY   • OTHER SURGICAL HISTORY      ROTATOR CUFF REPAIR        Family History   Problem Relation Age of Onset   • Leukemia Father    • Heart disease Maternal Aunt    • Diabetes Daughter        Social History     Socioeconomic History   • Marital status:      Spouse name: Not on file   • Number of children: Not on file   • Years of education: Not on file   • Highest education level: Not on file   Tobacco Use   • Smoking status: Never Smoker   • Smokeless tobacco: Never Used   Substance and Sexual Activity   • Alcohol use: Yes     Comment: BEING A SOCIAL DRINKER   • Drug use: No       Current Outpatient Medications on File Prior to Visit   Medication Sig Dispense Refill   • aspirin 325 MG tablet Take 1 tablet by mouth daily.     • atorvastatin (LIPITOR) 20 MG tablet Take 20 mg by mouth daily.     • Cholecalciferol (VITAMIN D3) 5000 UNITS tablet Take by mouth.     • Cyanocobalamin (B-12) 1000 MCG capsule Take 1 capsule by mouth.     • metoprolol tartrate (LOPRESSOR) 100 MG tablet Take 1 tablet by mouth 2 (two) times a day.     • niacin 500 MG tablet Take 1 tablet by mouth daily.     • nitroglycerin (NITROSTAT) 0.4 MG SL tablet Place 0.4 mg under the tongue.     • pantoprazole (PROTONIX) 40 MG EC tablet Take 1 tablet by mouth daily.     • valsartan (DIOVAN) 160 MG tablet Take 1 tablet by mouth Daily. 90 tablet 2   • hydrocortisone (ANUSOL-HC) 25 MG suppository Hydrocortisone Acetate 25 MG Rectal Suppository; Patient Sig: Hydrocortisone Acetate 25 MG Rectal Suppository INSERT ONE SUPPOSITORY RECTALLY TWICE  "DAILY AS NEEDED; 12; 4; 19-Nov-2013; Active     • trospium (SANCTURA) 20 MG tablet Take 20 mg by mouth 2 (Two) Times a Day.     • [DISCONTINUED] cyclobenzaprine (FLEXERIL) 5 MG tablet Take 1 tablet by mouth 3 (Three) Times a Day As Needed for Muscle Spasms. 20 tablet 1   • [DISCONTINUED] fluocinolone (SYNALAR) 0.01 % external solution Apply topically. APPLY TO AFFECTED AREA AS DIRECTED     • [DISCONTINUED] hydrochlorothiazide (MICROZIDE) 12.5 MG capsule Take 1 capsule by mouth Daily. 30 capsule 5     No current facility-administered medications on file prior to visit.       Allergies   Allergen Reactions   • Iodine        Immunization History   Administered Date(s) Administered   • Fluzone High Dose =>65 Years ([employer] ONLY) 09/17/2016, 10/01/2018, 10/03/2019, 09/09/2020   • Hepatitis A 07/07/2018, 10/19/2018   • Influenza TIV (IM) 09/11/2015   • Influenza, Unspecified 01/01/2010, 09/30/2013, 09/25/2014   • Pneumococcal Conjugate 13-Valent (PCV13) 03/09/2015, 04/25/2016   • Pneumococcal Polysaccharide (PPSV23) 10/03/2007, 09/30/2013   • Tdap 10/03/2007, 07/31/2017       Objective     /74   Pulse 63   Ht 181.6 cm (71.5\")   Wt 111 kg (244 lb)   BMI 33.56 kg/m²     Physical Exam  Vitals and nursing note reviewed.   Constitutional:       Appearance: Normal appearance.   HENT:      Head: Normocephalic and atraumatic.      Right Ear: Tympanic membrane normal.      Left Ear: Tympanic membrane normal.      Nose: Nose normal.      Mouth/Throat:      Mouth: Mucous membranes are moist.   Eyes:      Extraocular Movements: Extraocular movements intact.      Pupils: Pupils are equal, round, and reactive to light.   Cardiovascular:      Rate and Rhythm: Normal rate and regular rhythm.      Pulses: Normal pulses.      Heart sounds: Normal heart sounds.   Pulmonary:      Effort: Pulmonary effort is normal.      Breath sounds: Normal breath sounds.   Abdominal:      General: Abdomen is flat.      Palpations: Abdomen is " soft.   Musculoskeletal:         General: Normal range of motion.      Cervical back: Normal range of motion and neck supple.      Comments: Arthritic changes   Skin:     General: Skin is warm and dry.   Neurological:      General: No focal deficit present.      Mental Status: He is alert and oriented to person, place, and time.   Psychiatric:         Mood and Affect: Mood normal.         Behavior: Behavior normal.         Thought Content: Thought content normal.         Judgment: Judgment normal.         Assessment/Plan   Diagnoses and all orders for this visit:    1. Healthcare maintenance (Primary)    2. Benign essential hypertension    3. Sick sinus syndrome (CMS/HCC)    4. Longstanding persistent atrial fibrillation (CMS/HCC)    5. Arteriosclerosis of coronary artery    6. Obstructive apnea    Other orders  -     hydroCHLOROthiazide (MICROZIDE) 12.5 MG capsule; Take 1 capsule by mouth Daily.  Dispense: 90 capsule; Refill: 2        Discussion    AWV.      Direct observation of cognitive ability was evaluated.  Patient was given health advice or handouts on the following:  nutrition, fall prevention, exercise, weight management     patient w/ above medical issues. Will restart hctz given elevated bp in office today. He will continue to monitor bp and weight. Will have potassium tested w/ labs from [facility]. He is encouraged a low sodium diet w/ increased movement. He is to follow up w/ cardiology routinely. He is feeling improvement w/ new pacer placement. He will wear cpap for bettye every evening. He will follow up in office in 6 months or prn.          No future appointments.

## 2021-08-31 ENCOUNTER — OFFICE VISIT (OUTPATIENT)
Dept: INTERNAL MEDICINE | Facility: CLINIC | Age: 86
End: 2021-08-31

## 2021-08-31 VITALS
BODY MASS INDEX: 32.51 KG/M2 | HEIGHT: 72 IN | OXYGEN SATURATION: 92 % | HEART RATE: 87 BPM | TEMPERATURE: 101 F | WEIGHT: 240 LBS

## 2021-08-31 DIAGNOSIS — R60.9 EDEMA, UNSPECIFIED TYPE: ICD-10-CM

## 2021-08-31 DIAGNOSIS — I25.10 ARTERIOSCLEROSIS OF CORONARY ARTERY: Primary | ICD-10-CM

## 2021-08-31 DIAGNOSIS — J18.9 COMMUNITY ACQUIRED PNEUMONIA OF RIGHT MIDDLE LOBE OF LUNG: ICD-10-CM

## 2021-08-31 DIAGNOSIS — R06.00 DYSPNEA, UNSPECIFIED TYPE: ICD-10-CM

## 2021-08-31 LAB
HCT VFR BLDA CALC: 38.4 % (ref 38–51)
HGB BLDA-MCNC: 13.4 G/DL (ref 12–17)
MCH, POC: 31.6
MCHC, POC: 34.9
MCV, POC: 90.6
PLATELET # BLD AUTO: 165 10*3/MM3
PMV BLD: 8.2 FL
RBC, POC: 4.24
RDW, POC: 14
WBC # BLD: 10.2 10*3/UL

## 2021-08-31 PROCEDURE — 85027 COMPLETE CBC AUTOMATED: CPT | Performed by: INTERNAL MEDICINE

## 2021-08-31 PROCEDURE — 99214 OFFICE O/P EST MOD 30 MIN: CPT | Performed by: INTERNAL MEDICINE

## 2021-08-31 PROCEDURE — 93000 ELECTROCARDIOGRAM COMPLETE: CPT | Performed by: INTERNAL MEDICINE

## 2021-08-31 PROCEDURE — 71046 X-RAY EXAM CHEST 2 VIEWS: CPT | Performed by: INTERNAL MEDICINE

## 2021-08-31 RX ORDER — METOPROLOL TARTRATE 50 MG/1
50 TABLET, FILM COATED ORAL 2 TIMES DAILY
Qty: 60 TABLET | Refills: 4 | Status: SHIPPED | OUTPATIENT
Start: 2021-08-31

## 2021-08-31 RX ORDER — DOXYCYCLINE 100 MG/1
100 CAPSULE ORAL 2 TIMES DAILY
Qty: 14 CAPSULE | Refills: 0 | Status: SHIPPED | OUTPATIENT
Start: 2021-08-31 | End: 2021-09-06 | Stop reason: HOSPADM

## 2021-08-31 RX ORDER — SACCHAROMYCES BOULARDII 250 MG
250 CAPSULE ORAL 2 TIMES DAILY
Qty: 20 CAPSULE | Refills: 0 | Status: SHIPPED | OUTPATIENT
Start: 2021-08-31 | End: 2022-04-18

## 2021-08-31 NOTE — PROGRESS NOTES
"Chief Complaint   Patient presents with   • Fever   • Fatigue   • Shortness of Breath       History of Present Illness   Scar Pérez is a 87 y.o. male presents for acute care. Patient reports fatigue that started about 3 days ago. \"similar to when my pace maker wasn't working\". Went to urgent care. Was advised that bp is low but pacer is catching. bp has been running low. sbp 101-106/ 50s. Feeling dizzy when going lying to standing. Temp max 101 at home. He had some increased urinary freq a couple of days ago but normal urine activity today. He feels some exertional but not resting dyspnea. He is febrile in office today. At urgent care flu and covid testing were obtained and are negative.           The following portions of the patient's history were reviewed and updated as appropriate: allergies, current medications, past family history, past medical history, past social history, past surgical history and problem list.  Current Outpatient Medications on File Prior to Visit   Medication Sig Dispense Refill   • aspirin 325 MG tablet Take 1 tablet by mouth daily.     • atorvastatin (LIPITOR) 20 MG tablet Take 20 mg by mouth daily.     • Cholecalciferol (VITAMIN D3) 5000 UNITS tablet Take by mouth.     • Cyanocobalamin (B-12) 1000 MCG capsule Take 1 capsule by mouth.     • hydroCHLOROthiazide (MICROZIDE) 12.5 MG capsule Take 1 capsule by mouth Daily. 90 capsule 2   • hydrocortisone (ANUSOL-HC) 25 MG suppository Hydrocortisone Acetate 25 MG Rectal Suppository; Patient Sig: Hydrocortisone Acetate 25 MG Rectal Suppository INSERT ONE SUPPOSITORY RECTALLY TWICE DAILY AS NEEDED; 12; 4; 19-Nov-2013; Active     • niacin 500 MG tablet Take 1 tablet by mouth daily.     • nitroglycerin (NITROSTAT) 0.4 MG SL tablet Place 0.4 mg under the tongue.     • pantoprazole (PROTONIX) 40 MG EC tablet Take 1 tablet by mouth daily.     • trospium (SANCTURA) 20 MG tablet Take 20 mg by mouth 2 (Two) Times a Day.     • valsartan (DIOVAN) " "160 MG tablet Take 1 tablet by mouth Daily. 90 tablet 2   • [DISCONTINUED] metoprolol tartrate (LOPRESSOR) 100 MG tablet Take 1 tablet by mouth 2 (two) times a day.       No current facility-administered medications on file prior to visit.     Review of Systems   Constitutional: Negative.    HENT: Negative.    Eyes: Negative.    Respiratory: Positive for shortness of breath.    Cardiovascular: Positive for leg swelling. Negative for chest pain.   Gastrointestinal: Negative.    Endocrine: Negative.    Genitourinary: Negative.    Musculoskeletal: Negative.    Skin: Negative.    Allergic/Immunologic: Negative.    Neurological: Negative.    Hematological: Negative.    Psychiatric/Behavioral: Negative.        Objective   Physical Exam   CXR for dyspnea and fever. rml w/ infiltrate. No prior.   Cbc tested. Wbc 10.3    Pulse 87   Temp (!) 101 °F (38.3 °C)   Ht 181.6 cm (71.5\")   Wt 109 kg (240 lb)   SpO2 92%   BMI 33.01 kg/m²     Assessment/Plan   Diagnoses and all orders for this visit:    Arteriosclerosis of coronary artery  -     Comprehensive Metabolic Panel  -     TSH  -     POC CBC  -     proBNP  -     D-dimer, Quantitative    Dyspnea, unspecified type  -     Comprehensive Metabolic Panel  -     TSH  -     POC CBC  -     proBNP  -     D-dimer, Quantitative  -     XR Chest PA & Lateral (In Office)  -     ECG 12 Lead    Edema, unspecified type  -     Comprehensive Metabolic Panel  -     TSH  -     POC CBC  -     proBNP  -     D-dimer, Quantitative    Community acquired pneumonia of right middle lobe of lung    Other orders  -     metoprolol tartrate (Lopressor) 50 MG tablet; Take 1 tablet by mouth 2 (Two) Times a Day.  -     doxycycline (MONODOX) 100 MG capsule; Take 1 capsule by mouth 2 (Two) Times a Day.  -     saccharomyces boulardii (Florastor) 250 MG capsule; Take 1 capsule by mouth 2 (Two) Times a Day.    icc results/ notes from 8/28 obtained and reviewed with patient  dyspnea- sat 93%. He is to continue to " monitor this. Given swelling will check bnp and d dimer although suspect related to #2  Abnormal cxr- suspect cap. Neg for covid and flu. Will retest covid. empyric tx w/ doxycycline. Awaiting stat overread on cxr. Cbc tested. Wbc slightly elevated. H/h stable. Other labs pending at this time.     Hypotension- will reduce metoprolol to 50 mg bid. Will reduce valsartan to 50 mg. Will monitor bp and further reduce med if needed. He will f/u w/ cardiology once afebrile.   Total work in visit > 45 min w/ cxr, labs, patient evaluation, and counseling patient.   F/u in office in 3 days or prn.

## 2021-09-01 LAB
ALBUMIN SERPL-MCNC: 3.2 G/DL (ref 3.6–4.6)
ALBUMIN/GLOB SERPL: 1.2 {RATIO} (ref 1.2–2.2)
ALP SERPL-CCNC: 54 IU/L (ref 48–121)
ALT SERPL-CCNC: 70 IU/L (ref 0–44)
AST SERPL-CCNC: 81 IU/L (ref 0–40)
BILIRUB SERPL-MCNC: 1.4 MG/DL (ref 0–1.2)
BUN SERPL-MCNC: 22 MG/DL (ref 8–27)
BUN/CREAT SERPL: 19 (ref 10–24)
CALCIUM SERPL-MCNC: 8.2 MG/DL (ref 8.6–10.2)
CHLORIDE SERPL-SCNC: 87 MMOL/L (ref 96–106)
CO2 SERPL-SCNC: 24 MMOL/L (ref 20–29)
CREAT SERPL-MCNC: 1.16 MG/DL (ref 0.76–1.27)
D DIMER PPP FEU-MCNC: 3.46 MG/L FEU (ref 0–0.49)
GLOBULIN SER CALC-MCNC: 2.7 G/DL (ref 1.5–4.5)
GLUCOSE SERPL-MCNC: 124 MG/DL (ref 65–99)
NT-PROBNP SERPL-MCNC: 1589 PG/ML (ref 0–486)
POTASSIUM SERPL-SCNC: 4 MMOL/L (ref 3.5–5.2)
PROT SERPL-MCNC: 5.9 G/DL (ref 6–8.5)
SARS-COV-2 RNA RESP QL NAA+PROBE: NOT DETECTED
SODIUM SERPL-SCNC: 124 MMOL/L (ref 134–144)
TSH SERPL DL<=0.005 MIU/L-ACNC: 1.79 UIU/ML (ref 0.45–4.5)

## 2021-09-01 RX ORDER — AMOXICILLIN AND CLAVULANATE POTASSIUM 875; 125 MG/1; MG/1
1 TABLET, FILM COATED ORAL 2 TIMES DAILY
Qty: 14 TABLET | Refills: 0 | Status: SHIPPED | OUTPATIENT
Start: 2021-09-01 | End: 2021-09-06 | Stop reason: HOSPADM

## 2021-09-03 ENCOUNTER — APPOINTMENT (OUTPATIENT)
Dept: CT IMAGING | Facility: HOSPITAL | Age: 86
End: 2021-09-03

## 2021-09-03 ENCOUNTER — APPOINTMENT (OUTPATIENT)
Dept: GENERAL RADIOLOGY | Facility: HOSPITAL | Age: 86
End: 2021-09-03

## 2021-09-03 ENCOUNTER — HOSPITAL ENCOUNTER (INPATIENT)
Facility: HOSPITAL | Age: 86
LOS: 3 days | Discharge: HOME | End: 2021-09-06
Attending: EMERGENCY MEDICINE | Admitting: HOSPITALIST

## 2021-09-03 ENCOUNTER — OFFICE VISIT (OUTPATIENT)
Dept: INTERNAL MEDICINE | Facility: CLINIC | Age: 86
End: 2021-09-03

## 2021-09-03 VITALS
OXYGEN SATURATION: 91 % | WEIGHT: 240 LBS | HEART RATE: 53 BPM | SYSTOLIC BLOOD PRESSURE: 98 MMHG | TEMPERATURE: 97.5 F | BODY MASS INDEX: 33.01 KG/M2 | DIASTOLIC BLOOD PRESSURE: 60 MMHG

## 2021-09-03 DIAGNOSIS — R09.02 HYPOXIA: ICD-10-CM

## 2021-09-03 DIAGNOSIS — J18.9 COMMUNITY ACQUIRED PNEUMONIA, UNSPECIFIED LATERALITY: Primary | ICD-10-CM

## 2021-09-03 DIAGNOSIS — J18.9 COMMUNITY ACQUIRED PNEUMONIA OF RIGHT LUNG, UNSPECIFIED PART OF LUNG: Primary | ICD-10-CM

## 2021-09-03 DIAGNOSIS — E87.1 HYPONATREMIA: ICD-10-CM

## 2021-09-03 DIAGNOSIS — R79.89 POSITIVE D DIMER: ICD-10-CM

## 2021-09-03 DIAGNOSIS — I50.21 ACUTE SYSTOLIC CHF (CONGESTIVE HEART FAILURE) (HCC): ICD-10-CM

## 2021-09-03 DIAGNOSIS — I95.9 HYPOTENSION, UNSPECIFIED HYPOTENSION TYPE: ICD-10-CM

## 2021-09-03 LAB
ALBUMIN SERPL-MCNC: 3 G/DL (ref 3.5–5.2)
ALBUMIN/GLOB SERPL: 1 G/DL
ALP SERPL-CCNC: 60 U/L (ref 39–117)
ALT SERPL W P-5'-P-CCNC: 107 U/L (ref 1–41)
ANION GAP SERPL CALCULATED.3IONS-SCNC: 11.4 MMOL/L (ref 5–15)
AST SERPL-CCNC: 87 U/L (ref 1–40)
B PARAPERT DNA SPEC QL NAA+PROBE: NOT DETECTED
B PERT DNA SPEC QL NAA+PROBE: NOT DETECTED
BASOPHILS # BLD AUTO: 0.02 10*3/MM3 (ref 0–0.2)
BASOPHILS NFR BLD AUTO: 0.3 % (ref 0–1.5)
BILIRUB SERPL-MCNC: 0.9 MG/DL (ref 0–1.2)
BUN SERPL-MCNC: 22 MG/DL (ref 8–23)
BUN/CREAT SERPL: 25.9 (ref 7–25)
C PNEUM DNA NPH QL NAA+NON-PROBE: NOT DETECTED
CALCIUM SPEC-SCNC: 9 MG/DL (ref 8.6–10.5)
CHLORIDE SERPL-SCNC: 91 MMOL/L (ref 98–107)
CO2 SERPL-SCNC: 26.6 MMOL/L (ref 22–29)
CREAT SERPL-MCNC: 0.85 MG/DL (ref 0.76–1.27)
D-LACTATE SERPL-SCNC: 1.4 MMOL/L (ref 0.5–2)
DEPRECATED RDW RBC AUTO: 43.7 FL (ref 37–54)
EOSINOPHIL # BLD AUTO: 0.21 10*3/MM3 (ref 0–0.4)
EOSINOPHIL NFR BLD AUTO: 2.9 % (ref 0.3–6.2)
ERYTHROCYTE [DISTWIDTH] IN BLOOD BY AUTOMATED COUNT: 12.9 % (ref 12.3–15.4)
FLUAV SUBTYP SPEC NAA+PROBE: NOT DETECTED
FLUBV RNA ISLT QL NAA+PROBE: NOT DETECTED
GFR SERPL CREATININE-BSD FRML MDRD: 85 ML/MIN/1.73
GLOBULIN UR ELPH-MCNC: 3 GM/DL
GLUCOSE SERPL-MCNC: 109 MG/DL (ref 65–99)
HADV DNA SPEC NAA+PROBE: NOT DETECTED
HCOV 229E RNA SPEC QL NAA+PROBE: NOT DETECTED
HCOV HKU1 RNA SPEC QL NAA+PROBE: NOT DETECTED
HCOV NL63 RNA SPEC QL NAA+PROBE: NOT DETECTED
HCOV OC43 RNA SPEC QL NAA+PROBE: NOT DETECTED
HCT VFR BLD AUTO: 40.3 % (ref 37.5–51)
HGB BLD-MCNC: 13.8 G/DL (ref 13–17.7)
HMPV RNA NPH QL NAA+NON-PROBE: NOT DETECTED
HPIV1 RNA SPEC QL NAA+PROBE: NOT DETECTED
HPIV2 RNA SPEC QL NAA+PROBE: NOT DETECTED
HPIV3 RNA NPH QL NAA+PROBE: NOT DETECTED
HPIV4 P GENE NPH QL NAA+PROBE: NOT DETECTED
IMM GRANULOCYTES # BLD AUTO: 0.03 10*3/MM3 (ref 0–0.05)
IMM GRANULOCYTES NFR BLD AUTO: 0.4 % (ref 0–0.5)
LYMPHOCYTES # BLD AUTO: 0.68 10*3/MM3 (ref 0.7–3.1)
LYMPHOCYTES NFR BLD AUTO: 9.4 % (ref 19.6–45.3)
M PNEUMO IGG SER IA-ACNC: NOT DETECTED
MCH RBC QN AUTO: 31.7 PG (ref 26.6–33)
MCHC RBC AUTO-ENTMCNC: 34.2 G/DL (ref 31.5–35.7)
MCV RBC AUTO: 92.4 FL (ref 79–97)
MONOCYTES # BLD AUTO: 0.8 10*3/MM3 (ref 0.1–0.9)
MONOCYTES NFR BLD AUTO: 11.1 % (ref 5–12)
NEUTROPHILS NFR BLD AUTO: 5.47 10*3/MM3 (ref 1.7–7)
NEUTROPHILS NFR BLD AUTO: 75.9 % (ref 42.7–76)
NRBC BLD AUTO-RTO: 0 /100 WBC (ref 0–0.2)
PLATELET # BLD AUTO: 151 10*3/MM3 (ref 140–450)
PMV BLD AUTO: 10 FL (ref 6–12)
POTASSIUM SERPL-SCNC: 3.5 MMOL/L (ref 3.5–5.2)
PROCALCITONIN SERPL-MCNC: 0.15 NG/ML (ref 0–0.25)
PROT SERPL-MCNC: 6 G/DL (ref 6–8.5)
QT INTERVAL: 464 MS
RBC # BLD AUTO: 4.36 10*6/MM3 (ref 4.14–5.8)
RHINOVIRUS RNA SPEC NAA+PROBE: NOT DETECTED
RSV RNA NPH QL NAA+NON-PROBE: NOT DETECTED
SARS-COV-2 RNA NPH QL NAA+NON-PROBE: NOT DETECTED
SODIUM SERPL-SCNC: 129 MMOL/L (ref 136–145)
WBC # BLD AUTO: 7.21 10*3/MM3 (ref 3.4–10.8)

## 2021-09-03 PROCEDURE — 85025 COMPLETE CBC W/AUTO DIFF WBC: CPT | Performed by: EMERGENCY MEDICINE

## 2021-09-03 PROCEDURE — 84145 PROCALCITONIN (PCT): CPT | Performed by: EMERGENCY MEDICINE

## 2021-09-03 PROCEDURE — 93005 ELECTROCARDIOGRAM TRACING: CPT | Performed by: EMERGENCY MEDICINE

## 2021-09-03 PROCEDURE — 71045 X-RAY EXAM CHEST 1 VIEW: CPT

## 2021-09-03 PROCEDURE — 93010 ELECTROCARDIOGRAM REPORT: CPT | Performed by: INTERNAL MEDICINE

## 2021-09-03 PROCEDURE — 83605 ASSAY OF LACTIC ACID: CPT | Performed by: EMERGENCY MEDICINE

## 2021-09-03 PROCEDURE — 25010000002 AZITHROMYCIN PER 500 MG: Performed by: EMERGENCY MEDICINE

## 2021-09-03 PROCEDURE — 87040 BLOOD CULTURE FOR BACTERIA: CPT | Performed by: EMERGENCY MEDICINE

## 2021-09-03 PROCEDURE — 0202U NFCT DS 22 TRGT SARS-COV-2: CPT | Performed by: EMERGENCY MEDICINE

## 2021-09-03 PROCEDURE — 25010000002 CEFTRIAXONE PER 250 MG: Performed by: EMERGENCY MEDICINE

## 2021-09-03 PROCEDURE — 80053 COMPREHEN METABOLIC PANEL: CPT | Performed by: EMERGENCY MEDICINE

## 2021-09-03 PROCEDURE — 71250 CT THORAX DX C-: CPT

## 2021-09-03 PROCEDURE — 99284 EMERGENCY DEPT VISIT MOD MDM: CPT

## 2021-09-03 PROCEDURE — 99215 OFFICE O/P EST HI 40 MIN: CPT | Performed by: INTERNAL MEDICINE

## 2021-09-03 RX ORDER — NITROGLYCERIN 0.4 MG/1
0.4 TABLET SUBLINGUAL
Status: DISCONTINUED | OUTPATIENT
Start: 2021-09-03 | End: 2021-09-06 | Stop reason: HOSPADM

## 2021-09-03 RX ORDER — HYDROCHLOROTHIAZIDE 12.5 MG/1
12.5 CAPSULE, GELATIN COATED ORAL DAILY
Status: DISCONTINUED | OUTPATIENT
Start: 2021-09-04 | End: 2021-09-06 | Stop reason: HOSPADM

## 2021-09-03 RX ORDER — CHOLECALCIFEROL (VITAMIN D3) 125 MCG
1000 CAPSULE ORAL DAILY
Status: DISCONTINUED | OUTPATIENT
Start: 2021-09-04 | End: 2021-09-06 | Stop reason: HOSPADM

## 2021-09-03 RX ORDER — UREA 10 %
3 LOTION (ML) TOPICAL NIGHTLY PRN
Status: DISCONTINUED | OUTPATIENT
Start: 2021-09-03 | End: 2021-09-06 | Stop reason: HOSPADM

## 2021-09-03 RX ORDER — ONDANSETRON 4 MG/1
4 TABLET, FILM COATED ORAL EVERY 6 HOURS PRN
Status: DISCONTINUED | OUTPATIENT
Start: 2021-09-03 | End: 2021-09-06 | Stop reason: HOSPADM

## 2021-09-03 RX ORDER — SACCHAROMYCES BOULARDII 250 MG
250 CAPSULE ORAL 2 TIMES DAILY
Status: DISCONTINUED | OUTPATIENT
Start: 2021-09-03 | End: 2021-09-06 | Stop reason: HOSPADM

## 2021-09-03 RX ORDER — MELATONIN
5000 DAILY
Status: DISCONTINUED | OUTPATIENT
Start: 2021-09-04 | End: 2021-09-06 | Stop reason: HOSPADM

## 2021-09-03 RX ORDER — PANTOPRAZOLE SODIUM 40 MG/1
40 TABLET, DELAYED RELEASE ORAL DAILY
Status: DISCONTINUED | OUTPATIENT
Start: 2021-09-04 | End: 2021-09-06 | Stop reason: HOSPADM

## 2021-09-03 RX ORDER — OXYBUTYNIN CHLORIDE 10 MG/1
10 TABLET, EXTENDED RELEASE ORAL DAILY
Status: DISCONTINUED | OUTPATIENT
Start: 2021-09-04 | End: 2021-09-06 | Stop reason: HOSPADM

## 2021-09-03 RX ORDER — ONDANSETRON 2 MG/ML
4 INJECTION INTRAMUSCULAR; INTRAVENOUS EVERY 6 HOURS PRN
Status: DISCONTINUED | OUTPATIENT
Start: 2021-09-03 | End: 2021-09-06 | Stop reason: HOSPADM

## 2021-09-03 RX ORDER — METOPROLOL TARTRATE 50 MG/1
50 TABLET, FILM COATED ORAL 2 TIMES DAILY
Status: DISCONTINUED | OUTPATIENT
Start: 2021-09-03 | End: 2021-09-06 | Stop reason: HOSPADM

## 2021-09-03 RX ORDER — ATORVASTATIN CALCIUM 20 MG/1
20 TABLET, FILM COATED ORAL DAILY
Status: DISCONTINUED | OUTPATIENT
Start: 2021-09-04 | End: 2021-09-06 | Stop reason: HOSPADM

## 2021-09-03 RX ORDER — SODIUM CHLORIDE 0.9 % (FLUSH) 0.9 %
10 SYRINGE (ML) INJECTION AS NEEDED
Status: DISCONTINUED | OUTPATIENT
Start: 2021-09-03 | End: 2021-09-06 | Stop reason: HOSPADM

## 2021-09-03 RX ORDER — ACETAMINOPHEN 325 MG/1
650 TABLET ORAL EVERY 4 HOURS PRN
Status: DISCONTINUED | OUTPATIENT
Start: 2021-09-03 | End: 2021-09-06 | Stop reason: HOSPADM

## 2021-09-03 RX ORDER — ASPIRIN 325 MG
325 TABLET ORAL DAILY
Status: DISCONTINUED | OUTPATIENT
Start: 2021-09-04 | End: 2021-09-06 | Stop reason: HOSPADM

## 2021-09-03 RX ADMIN — CEFTRIAXONE 1 G: 1 INJECTION, POWDER, FOR SOLUTION INTRAMUSCULAR; INTRAVENOUS at 16:07

## 2021-09-03 RX ADMIN — AZITHROMYCIN MONOHYDRATE 500 MG: 500 INJECTION, POWDER, LYOPHILIZED, FOR SOLUTION INTRAVENOUS at 16:54

## 2021-09-03 NOTE — ED TRIAGE NOTES
Dx c pneumonia this am at [facility].  He is RA at baseline.  He was 87% c activity at [facility].  He is now on 2L O2 for sats 99%.  Fever to 101.8 yest.  Nurse from [facility] reports covid test results not back yet    Patient was placed in face mask during first look triage.  Patient was wearing a face mask throughout encounter.  I wore personal protective equipment throughout the encounter.  Hand hygiene was performed before and after patient encounter.

## 2021-09-03 NOTE — PROGRESS NOTES
Chief Complaint   Patient presents with   • Fatigue   • Pneumonia       History of Present Illness   Scar Pérez is a 87 y.o. male presents for follow up evaluation. To [facility] 6 days ago w/ fatigue and fever. Neg covid/ flu and sent home. To office 2 days ago. Diagnosed with CAP per cxr. Started on doxycycline and augmentin w/ florastor. Labs obtained but just available today w/ hyponatremia, elevated bnp,  and elevated D-dimer. He feels weak. Has no appetitie. Reduced metoprolol to new rx of 50mg. Wrote down instructions to take 1/2 tab of valsartan but did not reduce dosing. He did take an additional hctz x 2 due to increased edema. bp is low today. He gets dyspnic walking to his bathroom.          The following portions of the patient's history were reviewed and updated as appropriate: allergies, current medications, past family history, past medical history, past social history, past surgical history and problem list.  Current Outpatient Medications on File Prior to Visit   Medication Sig Dispense Refill   • amoxicillin-clavulanate (AUGMENTIN) 875-125 MG per tablet Take 1 tablet by mouth 2 (Two) Times a Day. 14 tablet 0   • aspirin 325 MG tablet Take 1 tablet by mouth daily.     • atorvastatin (LIPITOR) 20 MG tablet Take 20 mg by mouth daily.     • Cholecalciferol (VITAMIN D3) 5000 UNITS tablet Take by mouth.     • Cyanocobalamin (B-12) 1000 MCG capsule Take 1 capsule by mouth.     • doxycycline (MONODOX) 100 MG capsule Take 1 capsule by mouth 2 (Two) Times a Day. 14 capsule 0   • hydroCHLOROthiazide (MICROZIDE) 12.5 MG capsule Take 1 capsule by mouth Daily. 90 capsule 2   • hydrocortisone (ANUSOL-HC) 25 MG suppository Hydrocortisone Acetate 25 MG Rectal Suppository; Patient Sig: Hydrocortisone Acetate 25 MG Rectal Suppository INSERT ONE SUPPOSITORY RECTALLY TWICE DAILY AS NEEDED; 12; 4; 19-Nov-2013; Active     • metoprolol tartrate (Lopressor) 50 MG tablet Take 1 tablet by mouth 2 (Two) Times a Day. 60  tablet 4   • niacin 500 MG tablet Take 1 tablet by mouth daily.     • nitroglycerin (NITROSTAT) 0.4 MG SL tablet Place 0.4 mg under the tongue.     • pantoprazole (PROTONIX) 40 MG EC tablet Take 1 tablet by mouth daily.     • saccharomyces boulardii (Florastor) 250 MG capsule Take 1 capsule by mouth 2 (Two) Times a Day. 20 capsule 0   • trospium (SANCTURA) 20 MG tablet Take 20 mg by mouth 2 (Two) Times a Day.     • [DISCONTINUED] valsartan (DIOVAN) 160 MG tablet Take 1 tablet by mouth Daily. 90 tablet 2     No current facility-administered medications on file prior to visit.     Review of Systems   Constitutional: Positive for fatigue.   HENT: Negative.    Eyes: Negative.    Respiratory: Positive for shortness of breath. Negative for cough.    Cardiovascular: Positive for leg swelling.   Gastrointestinal: Negative.    Endocrine: Negative.    Genitourinary: Negative.    Musculoskeletal: Negative.    Skin: Negative.    Allergic/Immunologic: Negative.    Neurological: Negative.    Hematological: Negative.    Psychiatric/Behavioral: Negative.        Objective   Physical Exam  Vitals and nursing note reviewed.   HENT:      Head: Normocephalic and atraumatic.      Right Ear: Tympanic membrane normal.      Left Ear: Tympanic membrane normal.      Nose: Nose normal.      Mouth/Throat:      Mouth: Mucous membranes are moist.   Eyes:      Extraocular Movements: Extraocular movements intact.      Pupils: Pupils are equal, round, and reactive to light.   Cardiovascular:      Rate and Rhythm: Normal rate and regular rhythm.      Comments: Lower extremity edema B R>L  Abdominal:      General: Abdomen is flat.      Palpations: Abdomen is soft.   Musculoskeletal:         General: Normal range of motion.      Cervical back: Normal range of motion.   Skin:     General: Skin is warm and dry.   Neurological:      General: No focal deficit present.      Mental Status: He is oriented to person, place, and time.   Psychiatric:          Mood and Affect: Mood normal.         Behavior: Behavior normal.         Thought Content: Thought content normal.         Judgment: Judgment normal.          BP 98/60   Pulse 53   Temp 97.5 °F (36.4 °C)   Wt 109 kg (240 lb)   SpO2 91% Comment: 91  BMI 33.01 kg/m²     Assessment/Plan   Diagnoses and all orders for this visit:    Community acquired pneumonia of right lung, unspecified part of lung    Acute systolic CHF (congestive heart failure) (CMS/HCC)    Hyponatremia    Positive D dimer    Hypoxia    Hypotension, unspecified hypotension type      Patient w/ acute CAP. Admit to hospital.  Trial oral abx but now w/ worsened reduced oxygenation. Has peripheral edema w/ hyponatremia. Will need to monitor electrolytes and alter diuretic. He is hypotensive. Advised valsartan reduction but did not. Will hold med for now/ follow per hospitalist. He has an elevated d dimer and will need stat ct chest to further evaluate. Will need to ensure proper function of pacer and likely have cardiology monitor as well. He will f/u in office after hospital d/c.     ADDENDUM  No availble room at hospital at this time. Will send to ER and admit if appropriate.

## 2021-09-03 NOTE — H&P
HISTORY AND PHYSICAL   [facility]        Date of Admission: 9/3/2021  Patient Identification:  Name: Scar Pérez  Age: 87 y.o.  Sex: male  :  1934  MRN: [id number]                     Primary Care Physician: Marcelle Fields MD    Chief Complaint:  87 year old gentleman presented to his pcp today with fever, fatigue and shortness of breath; he had been seen at an [facility] and was started on doxycycline and augmentin with no improvement; he feels weak and has had poor po intake; he denies sick contacts; no fever or chills; he was sent to the ED for further evaluation and treatment; dyspnea is worse with exertion and starts with minimal activity    History of Present Illness:   As above    Past Medical History:  Past Medical History:   Diagnosis Date   • Abdominal aortic aneurysm (CMS/HCC)    • Benign essential hypertension    • Coronary atherosclerosis    • Esophageal reflux    • Hyperglycemia    • Hyperlipidemia     '   • Sick sinus syndrome (CMS/HCC)      Past Surgical History:  Past Surgical History:   Procedure Laterality Date   • CARDIAC PACEMAKER PLACEMENT     • CARDIAC PACEMAKER PLACEMENT     • CORONARY ANGIOPLASTY     • CORONARY ANGIOPLASTY WITH STENT PLACEMENT     • OTHER SURGICAL HISTORY      CATARACT SURGERY   • OTHER SURGICAL HISTORY      ROTATOR CUFF REPAIR       Home Meds:  (Not in a hospital admission)      Allergies:  Allergies   Allergen Reactions   • Iodine      Immunizations:  Immunization History   Administered Date(s) Administered   • COVID-19 (PFIZER) 2021, 2021   • Fluzone High Dose =>65 Years ([employer] ONLY) 2016, 10/01/2018, 10/03/2019, 2020   • Hepatitis A 2018, 10/19/2018   • Influenza TIV (IM) 2015   • Influenza, Unspecified 2010, 2013, 2014   • Pneumococcal Conjugate 13-Valent (PCV13) 2015, 2016   • Pneumococcal Polysaccharide (PPSV23) 10/03/2007, 2013   • Tdap 10/03/2007,  "2017     Social History:   Social History     Social History Narrative   • Not on file     Social History     Socioeconomic History   • Marital status:      Spouse name: Not on file   • Number of children: Not on file   • Years of education: Not on file   • Highest education level: Not on file   Tobacco Use   • Smoking status: Never Smoker   • Smokeless tobacco: Never Used   Substance and Sexual Activity   • Alcohol use: Yes     Comment: BEING A SOCIAL DRINKER   • Drug use: No       Family History:  Family History   Problem Relation Age of Onset   • Leukemia Father    • Heart disease Maternal Aunt    • Diabetes Daughter         Review of Systems  See history of present illness and past medical history.  Patient denies headache, dizziness, syncope, falls, trauma, change in vision, change in hearing, change in taste, changes in weight, changes in appetite, focal weakness, numbness, or paresthesia.  Patient denies chest pain, palpitations, dyspnea, orthopnea, PND, cough, sinus pressure, rhinorrhea, epistaxis, hemoptysis, nausea, vomiting,hematemesis, diarrhea, constipation or hematchezia.  Denies cold or heat intolerance, polydipsia, polyuria, polyphagia. Denies hematuria, pyuria, dysuria, hesitancy, frequency or urgency. Denies consumption of raw and under cooked meats foods or change in water source.  Denies fever, chills, sweats, night sweats.  Denies missing any routine medications. Remainder of ROS is negative.    Objective:  T Max 24 hrs: Temp (24hrs), Av.5 °F (36.4 °C), Min:97.5 °F (36.4 °C), Max:97.5 °F (36.4 °C)    Vitals Ranges:   Temp:  [97.5 °F (36.4 °C)] 97.5 °F (36.4 °C)  Heart Rate:  [53-77] 71  Resp:  [16] 16  BP: ()/(60-71) 132/62      Exam:  /62   Pulse 71   Temp 97.5 °F (36.4 °C) (Tympanic)   Resp 16   Ht 180.3 cm (71\")   Wt 108 kg (238 lb)   SpO2 99%   BMI 33.19 kg/m²     General Appearance:    Alert, cooperative, no distress, appears stated age   Head:    " Normocephalic, without obvious abnormality, atraumatic   Eyes:    PERRL, conjunctivae/corneas clear, EOM's intact, both eyes   Ears:    Normal external ear canals, both ears   Nose:   Nares normal, septum midline, mucosa normal, no drainage    or sinus tenderness   Throat:   Lips, mucosa, and tongue normal   Neck:   Supple, symmetrical, trachea midline, no adenopathy;     thyroid:  no enlargement/tenderness/nodules; no carotid    bruit or JVD   Back:     Symmetric, no curvature, ROM normal, no CVA tenderness   Lungs:     Decreased breath sounds bilaterally, respirations unlabored   Chest Wall:    No tenderness or deformity    Heart:    Regular rate and rhythm, S1 and S2 normal, no murmur, rub   or gallop   Abdomen:     Soft, nontender, bowel sounds active all four quadrants,     no masses, no hepatomegaly, no splenomegaly   Extremities:   Extremities normal, atraumatic, no cyanosis or edema   Pulses:   2+ and symmetric all extremities   Skin:   Skin color, texture, turgor normal, no rashes or lesions   Lymph nodes:   Cervical, supraclavicular, and axillary nodes normal   Neurologic:   CNII-XII intact, normal strength, sensation intact throughout      .    Data Review:  Labs in chart were reviewed.  WBC   Date Value Ref Range Status   09/03/2021 7.21 3.40 - 10.80 10*3/mm3 Final     Hemoglobin   Date Value Ref Range Status   09/03/2021 13.8 13.0 - 17.7 g/dL Final     Hematocrit   Date Value Ref Range Status   09/03/2021 40.3 37.5 - 51.0 % Final     Platelets   Date Value Ref Range Status   09/03/2021 151 140 - 450 10*3/mm3 Final     Sodium   Date Value Ref Range Status   09/03/2021 129 (L) 136 - 145 mmol/L Final     Potassium   Date Value Ref Range Status   09/03/2021 3.5 3.5 - 5.2 mmol/L Final     Chloride   Date Value Ref Range Status   09/03/2021 91 (L) 98 - 107 mmol/L Final     CO2   Date Value Ref Range Status   09/03/2021 26.6 22.0 - 29.0 mmol/L Final     BUN   Date Value Ref Range Status   09/03/2021 22 8 - 23  mg/dL Final     Creatinine   Date Value Ref Range Status   09/03/2021 0.85 0.76 - 1.27 mg/dL Final     Glucose   Date Value Ref Range Status   09/03/2021 109 (H) 65 - 99 mg/dL Final     Calcium   Date Value Ref Range Status   09/03/2021 9.0 8.6 - 10.5 mg/dL Final     AST (SGOT)   Date Value Ref Range Status   09/03/2021 87 (H) 1 - 40 U/L Final     ALT (SGPT)   Date Value Ref Range Status   09/03/2021 107 (H) 1 - 41 U/L Final     Alkaline Phosphatase   Date Value Ref Range Status   09/03/2021 60 39 - 117 U/L Final     No results found for: APTT, INR    Results from last 7 days   Lab Units 08/31/21  1545   TSH uIU/mL 1.790          Imaging Results (All)     Procedure Component Value Units Date/Time    CT Chest Without Contrast Diagnostic [[id number]] Collected: 09/03/21 1540     Updated: 09/03/21 1540    Narrative:      CT CHEST WITHOUT CONTRAST     HISTORY: 87-year-old male with shortness of breath and an abnormal chest  radiograph. Evaluate for a lung mass.     TECHNIQUE: Radiation dose reduction techniques were utilized, including  automated exposure control and exposure modulation based on body size.   3 mm images were obtained through the chest without the administration  of IV contrast. Compared with today's chest radiograph. There is no  previous chest CT for comparison.     FINDINGS: There is a triangulated mixed density airspace consolidation  at the right upper lobe extending from the hilum to the lateral  periphery. There is a component of atelectasis and volume loss. There is  a similar much smaller opacity at the anterolateral aspect of the left  lower lobe, image 68. There are no pleural or pericardial effusions.  There are shotty mediastinal nodes which are likely reactive. Ro are  difficult to evaluate in the absence of IV contrast. Incidentally noted  is at least 1 gallstone within the gallbladder without evidence for  cholecystitis and there is no biliary dilatation. There is a stable  small left  adrenal adenoma since a chest CT 09/21/2007.       Impression:      Large mixed density airspace consolidation at the right upper lobe is  suspected to represent a combination of atelectasis and pneumonia. There  is a similar but much smaller appearing opacity at the lingula.  Follow-up of the right upper lobe airspace consolidation is recommended,  particularly as the right hilum is not well evaluated in the absence of  IV contrast. Reevaluation is recommended with a chest CT in 2-3 weeks.     Discussed with Dr. Galvez.       XR Chest 1 View [[id number]] Collected: 09/03/21 1254     Updated: 09/03/21 1259    Narrative:      Portable chest radiograph     HISTORY:Shortness of air     TECHNIQUE: Single AP portable radiograph of the chest     COMPARISON:None       Impression:      FINDINGS AND IMPRESSION:  There is somewhat focal rounded area of pulmonary opacification  extending from the right hilum to the periphery of the right lung. While  findings are nonspecific, given the somewhat rounded appearance  underlying findings raise concern for potential underlying neoplasm and  further evaluation with CT chest with intravenous contrast is  recommended. Other differential consideration includes masslike  pneumonia. There is a left-sided pacemaker.     No pneumothorax or pleural effusion is seen. Cardiac silhouette is at  the upper limits of normal to mildly enlarged.     This report was finalized on 9/3/2021 12:56 PM by Dr. Ashish Quintana M.D.           Patient Active Problem List   Diagnosis Code   • Absolute anemia D64.9   • A-fib (CMS/HCC) I48.91   • Arteriosclerosis of coronary artery I25.10   • Bleeding gastrointestinal K92.2   • Lipoma of skin and subcutaneous tissue D17.30   • Obstructive apnea G47.33   • Hyperkalemia E87.5   • Benign essential hypertension I10   • Pure hypercholesterolemia E78.00   • Impaired fasting glucose R73.01   • Community acquired pneumonia J18.9       Assessment:    Community acquired  pneumonia  hypertension  Atrial fibrillation  Acute hypoxic respiratory failure  Hyponatremia  Cad  hyperglycemia    Plan:  Continue iv antibiotics  Ask pulmonary to see him  Wean oxygen as tolerated  Monitor on telemetry  Trend sodium  D.w patient and ED provider    Carolin Bowman MD  9/3/2021  17:43 EDT

## 2021-09-03 NOTE — ED PROVIDER NOTES
EMERGENCY DEPARTMENT ENCOUNTER    Room Number:  S414/1  Date of encounter:  9/4/2021  PCP: Marcelle Fields MD  Historian: Patient      HPI:  Chief Complaint: Fever and cough  A complete HPI/ROS/PMH/PSH/SH/FH are unobtainable due to: Nothing    Context: Scar Pérez is a 87 y.o. male who presents to the ED c/o fever for the last 4 days along with nonproductive cough and weakness.  Patient also claims shortness of breath.  He was seen by his PCP earlier today and felt to have pneumonia.  Patient was referred to the ER for evaluation and possible admission.    Patient states he had a temperature of up to 102, and he was documented to have O2 saturations of 86% on room air at the doctor's office today.  He has not been on any medications for it, and he does have a history of hypertension and coronary artery disease.    He is currently on nasal cannula oxygen and says he feels much better with a non-      PAST MEDICAL HISTORY  Active Ambulatory Problems     Diagnosis Date Noted   • Absolute anemia 02/12/2016   • A-fib (CMS/HCC) 02/12/2016   • Arteriosclerosis of coronary artery 02/12/2016   • Bleeding gastrointestinal 02/12/2016   • Lipoma of skin and subcutaneous tissue 02/12/2016   • Obstructive apnea 02/12/2016   • Hyperkalemia 02/12/2016   • Benign essential hypertension 02/12/2016   • Pure hypercholesterolemia 11/11/2016   • Impaired fasting glucose 11/11/2016     Resolved Ambulatory Problems     Diagnosis Date Noted   • No Resolved Ambulatory Problems     Past Medical History:   Diagnosis Date   • Abdominal aortic aneurysm (CMS/HCC)    • Coronary atherosclerosis    • Esophageal reflux    • Hyperglycemia    • Hyperlipidemia    • Sick sinus syndrome (CMS/HCC)          PAST SURGICAL HISTORY  Past Surgical History:   Procedure Laterality Date   • CARDIAC PACEMAKER PLACEMENT     • CARDIAC PACEMAKER PLACEMENT     • CORONARY ANGIOPLASTY     • CORONARY ANGIOPLASTY WITH STENT PLACEMENT     • OTHER SURGICAL HISTORY       CATARACT SURGERY   • OTHER SURGICAL HISTORY      ROTATOR CUFF REPAIR          FAMILY HISTORY  Family History   Problem Relation Age of Onset   • Leukemia Father    • Heart disease Maternal Aunt    • Diabetes Daughter          SOCIAL HISTORY  Social History     Socioeconomic History   • Marital status:      Spouse name: Not on file   • Number of children: Not on file   • Years of education: Not on file   • Highest education level: Not on file   Tobacco Use   • Smoking status: Never Smoker   • Smokeless tobacco: Never Used   Substance and Sexual Activity   • Alcohol use: Yes     Comment: BEING A SOCIAL DRINKER   • Drug use: No         ALLERGIES  Iodine        REVIEW OF SYSTEMS  Review of Systems     All systems reviewed and negative except for those discussed in HPI.       PHYSICAL EXAM    I have reviewed the triage vital signs and nursing notes.    ED Triage Vitals [09/03/21 1123]   Temp Heart Rate Resp BP SpO2   97.5 °F (36.4 °C) 60 16 -- 99 %      Temp src Heart Rate Source Patient Position BP Location FiO2 (%)   Tympanic Monitor -- -- --       Physical Exam  GENERAL: Awake and alert, elderly  HENT: nares patent  EYES: no scleral icterus  CV: regular rhythm, regular rate  RESPIRATORY: Mild increased work of breathing, coarse rhonchi in the right base, no accessory muscle use.  On 3 L by nasal cannula currently to maintain O2 sats  ABDOMEN: soft  MUSCULOSKELETAL: no deformity  NEURO: alert, moves all extremities, follows commands  SKIN: warm, dry        LAB RESULTS  Recent Results (from the past 24 hour(s))   Basic Metabolic Panel    Collection Time: 09/04/21  4:02 AM    Specimen: Blood   Result Value Ref Range    Glucose 94 65 - 99 mg/dL    BUN 14 8 - 23 mg/dL    Creatinine 0.68 (L) 0.76 - 1.27 mg/dL    Sodium 131 (L) 136 - 145 mmol/L    Potassium 3.3 (L) 3.5 - 5.2 mmol/L    Chloride 95 (L) 98 - 107 mmol/L    CO2 25.1 22.0 - 29.0 mmol/L    Calcium 8.2 (L) 8.6 - 10.5 mg/dL    eGFR Non African Amer 110 >60  mL/min/1.73    BUN/Creatinine Ratio 20.6 7.0 - 25.0    Anion Gap 10.9 5.0 - 15.0 mmol/L   CBC (No Diff)    Collection Time: 09/04/21  4:02 AM    Specimen: Blood   Result Value Ref Range    WBC 5.99 3.40 - 10.80 10*3/mm3    RBC 4.06 (L) 4.14 - 5.80 10*6/mm3    Hemoglobin 12.6 (L) 13.0 - 17.7 g/dL    Hematocrit 37.1 (L) 37.5 - 51.0 %    MCV 91.4 79.0 - 97.0 fL    MCH 31.0 26.6 - 33.0 pg    MCHC 34.0 31.5 - 35.7 g/dL    RDW 13.1 12.3 - 15.4 %    RDW-SD 43.7 37.0 - 54.0 fl    MPV 10.0 6.0 - 12.0 fL    Platelets 157 140 - 450 10*3/mm3       Ordered the above labs and independently reviewed the results.        RADIOLOGY  No Radiology Exams Resulted Within Past 24 Hours    I ordered the above noted radiological studies. Reviewed by me and discussed with radiologist.  See dictation for official radiology interpretation.      PROCEDURES    Procedures      MEDICATIONS GIVEN IN ER    Medications   sodium chloride 0.9 % flush 10 mL (has no administration in time range)   nitroglycerin (NITROSTAT) SL tablet 0.4 mg (has no administration in time range)   acetaminophen (TYLENOL) tablet 650 mg (has no administration in time range)   ondansetron (ZOFRAN) tablet 4 mg (has no administration in time range)     Or   ondansetron (ZOFRAN) injection 4 mg (has no administration in time range)   melatonin tablet 3 mg (has no administration in time range)   aspirin tablet 325 mg (325 mg Oral Given 9/4/21 0937)   atorvastatin (LIPITOR) tablet 20 mg (20 mg Oral Given 9/4/21 0937)   cholecalciferol (VITAMIN D3) tablet 5,000 Units (5,000 Units Oral Given 9/4/21 0937)   vitamin B-12 (CYANOCOBALAMIN) tablet 1,000 mcg (1,000 mcg Oral Given 9/4/21 0937)   hydroCHLOROthiazide (MICROZIDE) capsule 12.5 mg (12.5 mg Oral Given 9/4/21 1432)   metoprolol tartrate (LOPRESSOR) tablet 50 mg (50 mg Oral Not Given 9/4/21 0900)   pantoprazole (PROTONIX) EC tablet 40 mg (40 mg Oral Given 9/4/21 0937)   saccharomyces boulardii (FLORASTOR) capsule 250 mg (250 mg  Oral Given 9/4/21 1432)   oxybutynin XL (DITROPAN-XL) 24 hr tablet 10 mg (10 mg Oral Given 9/4/21 1432)   ampicillin-sulbactam (UNASYN) 3 g in sodium chloride 0.9 % 100 mL IVPB-VTB (3 g Intravenous New Bag 9/4/21 1441)   cefTRIAXone (ROCEPHIN) 1 g in sodium chloride 0.9 % 100 mL IVPB-VTB (0 g Intravenous Stopped 9/3/21 1640)   AZITHROMYCIN 500 MG/250 ML 0.9% NS IVPB (vial-mate) (500 mg Intravenous New Bag 9/3/21 1654)         PROGRESS, DATA ANALYSIS, CONSULTS, AND MEDICAL DECISION MAKING    All labs have been independently reviewed by me.  All radiology studies have been reviewed by me and discussed with radiologist dictating the report.   EKG's independently viewed and interpreted by me.  Discussion below represents my analysis of pertinent findings related to patient's condition, differential diagnosis, treatment plan and final disposition.        Patient wore surgical mask, I wore an N95/face shield, gown and gloves for the entire interaction    AS OF 18:01 EDT VITALS:    BP - 134/91  HR - 65  TEMP - 98.1 °F (36.7 °C) (Oral)  O2 SATS - 97%        DIAGNOSIS  Final diagnoses:   Community acquired pneumonia, unspecified laterality         DISPOSITION  Admit to telemetry           Eric Galvez MD  09/04/21 5183

## 2021-09-04 LAB
ANION GAP SERPL CALCULATED.3IONS-SCNC: 10.9 MMOL/L (ref 5–15)
BUN SERPL-MCNC: 14 MG/DL (ref 8–23)
BUN/CREAT SERPL: 20.6 (ref 7–25)
CALCIUM SPEC-SCNC: 8.2 MG/DL (ref 8.6–10.5)
CHLORIDE SERPL-SCNC: 95 MMOL/L (ref 98–107)
CO2 SERPL-SCNC: 25.1 MMOL/L (ref 22–29)
CREAT SERPL-MCNC: 0.68 MG/DL (ref 0.76–1.27)
DEPRECATED RDW RBC AUTO: 43.7 FL (ref 37–54)
ERYTHROCYTE [DISTWIDTH] IN BLOOD BY AUTOMATED COUNT: 13.1 % (ref 12.3–15.4)
GFR SERPL CREATININE-BSD FRML MDRD: 110 ML/MIN/1.73
GLUCOSE SERPL-MCNC: 94 MG/DL (ref 65–99)
HCT VFR BLD AUTO: 37.1 % (ref 37.5–51)
HGB BLD-MCNC: 12.6 G/DL (ref 13–17.7)
MCH RBC QN AUTO: 31 PG (ref 26.6–33)
MCHC RBC AUTO-ENTMCNC: 34 G/DL (ref 31.5–35.7)
MCV RBC AUTO: 91.4 FL (ref 79–97)
PLATELET # BLD AUTO: 157 10*3/MM3 (ref 140–450)
PMV BLD AUTO: 10 FL (ref 6–12)
POTASSIUM SERPL-SCNC: 3.3 MMOL/L (ref 3.5–5.2)
RBC # BLD AUTO: 4.06 10*6/MM3 (ref 4.14–5.8)
SODIUM SERPL-SCNC: 131 MMOL/L (ref 136–145)
WBC # BLD AUTO: 5.99 10*3/MM3 (ref 3.4–10.8)

## 2021-09-04 PROCEDURE — 85027 COMPLETE CBC AUTOMATED: CPT | Performed by: INTERNAL MEDICINE

## 2021-09-04 PROCEDURE — 36415 COLL VENOUS BLD VENIPUNCTURE: CPT | Performed by: INTERNAL MEDICINE

## 2021-09-04 PROCEDURE — 25010000003 AMPICILLIN-SULBACTAM PER 1.5 G: Performed by: HOSPITALIST

## 2021-09-04 PROCEDURE — 80048 BASIC METABOLIC PNL TOTAL CA: CPT | Performed by: INTERNAL MEDICINE

## 2021-09-04 PROCEDURE — 92610 EVALUATE SWALLOWING FUNCTION: CPT

## 2021-09-04 RX ORDER — VALSARTAN 160 MG/1
80 TABLET ORAL DAILY
COMMUNITY
End: 2021-09-14

## 2021-09-04 RX ADMIN — ATORVASTATIN CALCIUM 20 MG: 20 TABLET, FILM COATED ORAL at 09:37

## 2021-09-04 RX ADMIN — Medication 5000 UNITS: at 09:37

## 2021-09-04 RX ADMIN — ASPIRIN 325 MG: 325 TABLET ORAL at 09:37

## 2021-09-04 RX ADMIN — AMPICILLIN SODIUM AND SULBACTAM SODIUM 3 G: 2; 1 INJECTION, POWDER, FOR SOLUTION INTRAMUSCULAR; INTRAVENOUS at 20:13

## 2021-09-04 RX ADMIN — HYDROCHLOROTHIAZIDE 12.5 MG: 12.5 CAPSULE ORAL at 14:32

## 2021-09-04 RX ADMIN — METOPROLOL TARTRATE 50 MG: 50 TABLET, FILM COATED ORAL at 00:00

## 2021-09-04 RX ADMIN — OXYBUTYNIN CHLORIDE 10 MG: 10 TABLET, EXTENDED RELEASE ORAL at 14:32

## 2021-09-04 RX ADMIN — PANTOPRAZOLE SODIUM 40 MG: 40 TABLET, DELAYED RELEASE ORAL at 09:37

## 2021-09-04 RX ADMIN — AMPICILLIN SODIUM AND SULBACTAM SODIUM 3 G: 2; 1 INJECTION, POWDER, FOR SOLUTION INTRAMUSCULAR; INTRAVENOUS at 14:41

## 2021-09-04 RX ADMIN — Medication 250 MG: at 00:00

## 2021-09-04 RX ADMIN — Medication 250 MG: at 14:32

## 2021-09-04 RX ADMIN — METOPROLOL TARTRATE 50 MG: 50 TABLET, FILM COATED ORAL at 20:13

## 2021-09-04 RX ADMIN — Medication 250 MG: at 20:13

## 2021-09-04 RX ADMIN — Medication 1000 MCG: at 09:37

## 2021-09-04 NOTE — THERAPY EVALUATION
Acute Care - Speech Language Pathology   Swallow Initial Evaluation [facility]     Patient Name: Scar Pérez  : 1934  MRN: [id number]  Today's Date: 2021               Admit Date: 9/3/2021    Visit Dx:     ICD-10-CM ICD-9-CM   1. Community acquired pneumonia, unspecified laterality  J18.9 486     Patient Active Problem List   Diagnosis   • Absolute anemia   • A-fib (CMS/HCC)   • Arteriosclerosis of coronary artery   • Bleeding gastrointestinal   • Lipoma of skin and subcutaneous tissue   • Obstructive apnea   • Hyperkalemia   • Benign essential hypertension   • Pure hypercholesterolemia   • Impaired fasting glucose   • Right upper lobe pneumonia     Past Medical History:   Diagnosis Date   • Abdominal aortic aneurysm (CMS/HCC)    • Benign essential hypertension    • Coronary atherosclerosis    • Esophageal reflux    • Hyperglycemia    • Hyperlipidemia     '   • Sick sinus syndrome (CMS/HCC)      Past Surgical History:   Procedure Laterality Date   • CARDIAC PACEMAKER PLACEMENT     • CARDIAC PACEMAKER PLACEMENT     • CORONARY ANGIOPLASTY     • CORONARY ANGIOPLASTY WITH STENT PLACEMENT     • OTHER SURGICAL HISTORY      CATARACT SURGERY   • OTHER SURGICAL HISTORY      ROTATOR CUFF REPAIR        SLP Recommendation and Plan  SLP Swallowing Diagnosis: mild, oral dysphagia, R/O pharyngeal dysphagia, esophageal dysphagia  SLP Diet Recommendation: regular textures, thin liquids (avoid dry/sticky foods)  Recommended Precautions and Strategies: upright posture during/after eating, small bites of food and sips of liquid, alternate between small bites of food and sips of liquid  SLP Rec. for Method of Medication Administration: meds whole, meds crushed, with thin liquids, as tolerated     Monitor for Signs of Aspiration: yes, notify SLP if any concerns  Recommended Diagnostics: VFSS (MBS)  Swallow Criteria for Skilled Therapeutic Interventions Met: demonstrates skilled criteria  Anticipated Discharge  Disposition (SLP): unknown  Rehab Potential/Prognosis, Swallowing: good, to achieve stated therapy goals  Therapy Frequency (Swallow): PRN  Predicted Duration Therapy Intervention (Days): until discharge  Demonstrates Need for Referral to Another Service: gastroenterology  Daily Summary of Progress (SLP): progress toward functional goals as expected                   Plan of Care Reviewed With: patient, family  Progress: no change  Outcome Summary: Pt seen for bedside swallow eval a/mild oral and suspected esophageal Dysphagia putting pt at high risk of aspiration. Rec regular diet, avoid dry/sticky foods, thin liquids, meds crushed or whole (one at a time) with thin liquid as tolerated. VFSS 9/6 or 9/7 or as outpatient. Refer to GI. ST to follow.         SWALLOW EVALUATION (last 72 hours)      SLP Adult Swallow Evaluation     Row Name 09/04/21 1426                   Rehab Evaluation    Document Type  evaluation  -JT        Subjective Information  no complaints  -JT        Patient Observations  alert;cooperative;agree to therapy  -JT        Patient/Family/Caregiver Comments/Observations  pt upright in bed w/family present.  -JT        Patient Effort  good  -JT        Comment  pt "[employer]  -JT        Symptoms Noted During/After Treatment  none  -JT           General Information    Patient Profile Reviewed  yes  -JT        Pertinent History Of Current Problem  Pt adm w/fever, fatigue. CT chest showing RUL Pna, resp failure. PMH CAD, GERD, Afib, Hiatal Hernia, HX of Esophageal dilation years ago.  -JT        Current Method of Nutrition  regular textures;thin liquids  -JT        Precautions/Limitations, Hearing  hearing impairment, bilaterally  -JT        Prior Level of Function-Communication  WFL  -JT        Prior Level of Function-Swallowing  esophageal concerns pt states difficulty swallowing some foods  -JT        Plans/Goals Discussed with  patient and family;agreed upon  -JT        Barriers to Rehab  hearing deficit   -JT        Patient's Goals for Discharge  patient did not state  -JT        Family Goals for Discharge  family did not state  -JT           Pain    Additional Documentation  Pain Scale: Numbers Pre/Post-Treatment (Group)  -JT           Pain Scale: Numbers Pre/Post-Treatment    Pretreatment Pain Rating  0/10 - no pain  -JT        Posttreatment Pain Rating  0/10 - no pain  -JT           Oral Motor Structure and Function    Dentition Assessment  natural, present and adequate  -JT        Secretion Management  WNL/WFL  -JT        Mucosal Quality  moist, healthy  -JT        Volitional Swallow  WFL  -JT        Volitional Cough  weak  -JT           Oral Musculature and Cranial Nerve Assessment    Oral Motor General Assessment  generalized oral motor weakness  -JT        Lingual Impairment, Detail. Cranial Nerves IX, XII (Glossopharyngeal and Hypoglossal)  reduced lingual ROM  -JT        Vocal Impairment, Detail. Cranial Nerve X (Vagus)  impaired throat clear/cough (see comments) weak cough  -JT           General Eating/Swallowing Observations    Respiratory Support Currently in Use  room air  -JT        Eating/Swallowing Skills  fed by SLP  -JT        Positioning During Eating  upright 90 degree;upright in bed  -JT        Utensils Used  spoon;cup;straw  -JT        Consistencies Trialed  regular textures;soft textures;chopped;thin liquids;pureed  -JT           Clinical Swallow Eval    Oral Prep Phase  impaired  -JT        Oral Transit  WFL  -JT        Oral Residue  WFL  -JT        Pharyngeal Phase  no overt signs/symptoms of pharyngeal impairment  -JT        Esophageal Phase  suspected esophageal impairment  -JT        Clinical Swallow Evaluation Summary  Pt  seen for bedside swallow eval w/mild oral and suspected esophageal dysphagia. Pt w/hx of hiatal hernia and esophageal dilation c/o certain foods sticking in throat. Laryngeal elevation adequate upon palpation. Mildly prolonged mastication noted with regular solids. No  c/o food sticking during evaluation. Pt states he has trouble with steak and some other tough foods. No overt signs/symptoms aspiration noted in evaluation, but pt high risk due to esophageal difficulties. Rec regular diet chopped into small pieces, avoid dry/sticky foods, thin liquids, meds crushed or one at a time whole w/thin liquids as tolerated.  -JT           Oral Prep Concerns    Oral Prep Concerns  prolonged mastication;inefficient mastication  -JT        Prolonged Mastication  regular consistencies  -JT        Inefficient Mastication  regular consistencies  -JT           Esophageal Phase Concerns    Esophageal Phase Concerns  belching c/o foods sticking, not observed this date  -JT        Belching  all consistencies  -JT        Esophageal Phase Concerns, Comment  History of hiatal hernia and esophageal dilation  -JT           Clinical Impression    Daily Summary of Progress (SLP)  progress toward functional goals as expected  -JT        SLP Swallowing Diagnosis  mild;oral dysphagia;R/O pharyngeal dysphagia;esophageal dysphagia  -JT        Functional Impact  risk of aspiration/pneumonia;risk of malnutrition;risk of dehydration  -JT        Rehab Potential/Prognosis, Swallowing  good, to achieve stated therapy goals  -JT        Swallow Criteria for Skilled Therapeutic Interventions Met  demonstrates skilled criteria  -JT           Recommendations    Therapy Frequency (Swallow)  PRN  -JT        Predicted Duration Therapy Intervention (Days)  until discharge  -JT        SLP Diet Recommendation  regular textures;thin liquids avoid dry/sticky foods  -JT        Recommended Diagnostics  VFSS (MBS)  -JT        Recommended Precautions and Strategies  upright posture during/after eating;small bites of food and sips of liquid;alternate between small bites of food and sips of liquid  -JT        Oral Care Recommendations  Oral Care BID/PRN  -JT        SLP Rec. for Method of Medication Administration  meds whole;meds  crushed;with thin liquids;as tolerated  -JT        Monitor for Signs of Aspiration  yes;notify SLP if any concerns  -JT        Anticipated Discharge Disposition (SLP)  unknown  -JT        Demonstrates Need for Referral to Another Service  gastroenterology  -JT           Swallow Goals (SLP)    Oral Nutrition/Hydration Goal Selection (SLP)  oral nutrition/hydration, SLP goal 1  -JT           Oral Nutrition/Hydration Goal 1 (SLP)    Oral Nutrition/Hydration Goal 1, SLP  Pt will tolerate least restrictive diet w/no s/s aspiration.   -JT        Time Frame (Oral Nutrition/Hydration Goal 1, SLP)  by discharge  -JT          User Key  (r) = Recorded By, (t) = Taken By, (c) = Cosigned By    Initials Name Effective Dates    Aura Smith 06/16/21 -           EDUCATION  The patient has been educated in the following areas:   Dysphagia (Swallowing Impairment).     Patient was not wearing a face mask during this therapy encounter. Therapist used appropriate personal protective equipment including mask, eye protection and gloves.  Mask used was standard procedure mask. Appropriate PPE was worn during the entire therapy session. Hand hygiene was completed before and after therapy session. Patient is not in enhanced droplet precautions.                 SLP GOALS     Row Name 09/04/21 1426             Oral Nutrition/Hydration Goal 1 (SLP)    Oral Nutrition/Hydration Goal 1, SLP  Pt will tolerate least restrictive diet w/no s/s aspiration.   -JT      Time Frame (Oral Nutrition/Hydration Goal 1, SLP)  by discharge  -JT        User Key  (r) = Recorded By, (t) = Taken By, (c) = Cosigned By    Initials Name Provider Type    Aura Smith Speech and Language Pathologist           SLP Outcome Measures (last 72 hours)      SLP Outcome Measures     Row Name 09/04/21 1400             SLP Outcome Measures    Outcome Measure Used?  Adult NOMS  -JT         Adult FCM Scores    FCM Chosen  Swallowing  -JT      Swallowing FCM  Score  7  -JT        User Key  (r) = Recorded By, (t) = Taken By, (c) = Cosigned By    Initials Name Effective Dates    Aura Smith 06/16/21 -            Time Calculation:   Time Calculation- SLP     Row Name 09/04/21 1440             Time Calculation- SLP    SLP Start Time  1400  -JT      SLP Stop Time  1445  -JT      SLP Time Calculation (min)  45 min  -JT      SLP Received On  09/04/21  -JT         Untimed Charges    45953-UT Eval Oral Pharyng Swallow Minutes  45  -JT         Total Minutes    Untimed Charges Total Minutes  45  -JT       Total Minutes  45  -JT        User Key  (r) = Recorded By, (t) = Taken By, (c) = Cosigned By    Initials Name Provider Type    Aura Smith Speech and Language Pathologist          Therapy Charges for Today     Code Description Service Date Service Provider Modifiers Qty    [id number] HC ST EVAL ORAL PHARYNG SWALLOW 3 9/4/2021 Aura Stone GN 1               Aura Stone  9/4/2021

## 2021-09-04 NOTE — PLAN OF CARE
Goal Outcome Evaluation:            1956: Received pt from ED. Placed safely in bed, no family at bedside. Oriented to unit and procedures. Skin assessment done. Fall precaution observed. Call light within reach, will continue to monitor.  2134: Pt poor hx, placed call to Neda-Spouse for admission assessment. Will come tomorrow to visit pt. Will endorse to AM RN to f/u.  0504: MD notified of AM labs. NNO for now. Will continue to monitor.

## 2021-09-04 NOTE — CONSULTS
Group: [facility]         CONSULT NOTE    Patient Identification:    Scar Pérez  87 y.o.  male  1934  [id number]            Patient Care Team:  Marcelle Fields MD as PCP - General (Internal Medicine)  Sami Fay MD as Consulting Physician (Orthopedic Surgery)  Jeannie Palomo MD (Dermatology)  Yomi Delgadillo DPM as Consulting Physician (Podiatry)  Devon Florez MD (Vascular Surgery)  Dorian Connor MD as Consulting Physician (Cardiology)    Requesting physician: Dr. Sheppard    Reason for Consultation:  Acute respiratory failure    CC: Shortness of breath    History of Present Illness: Patient is a 87-year-old elderly white male patient with past medical history significant for CAD, severe PAD with AAA requiring EVAR, SSS s/p permanent pacemaker who reportedly was doing well till Tuesday of this week where he started noticing subjective fevers and his wife checked his temperature and noted to be elevated.  Patient states that he did have nonspecific shortness of breath but no significant cough or sputum production.  Eventually concerned about COVID-19 and was checked by primary care and was negative.  He was noted to have abnormal chest x-ray on follow-up testing on Friday at which point he was decide to be sent to the ER and was admitted to the hospital with acute respiratory failure.  Patient underwent a chest x-ray which showed right-sided infiltrate as well as CT of the chest was done which showed nonspecific groundglass/consolidation changes in the right upper lobe concerning for pneumonia and patient was started on antibiotics.  We have been asked to assist in management of patient    I have reviewed the H&P as well as the notes from the other consultants taking care of the patient this admission as well as previous hospital notes (if any available) from our group physicians and summarized above      Objective     Review of  Systems:  Constitutional: + fever, No - chills, weight loss or loss of appetite.   ENMT: No sinus congestion, post nasal drip, epistaxis, sore throat  Cardiovascular: No chest pain, palpitation or legs swelling.    Respiratory: No hemoptysis, orthopnea, PND.  Gastrointestinal: No constipation, diarrhea or abdominal pain   Neurology: No headache, focal weakness, numbness or dizziness.   Musculoskeletal: No joint stiffness or swelling.   Psychiatry: No agitation or behavioral changes  Lymphatic: No swollen neck glands.  Integumentary: No rash.    Past Medical History:  Past Medical History:   Diagnosis Date   • Abdominal aortic aneurysm (CMS/HCC)    • Benign essential hypertension    • Coronary atherosclerosis    • Esophageal reflux    • Hyperglycemia    • Hyperlipidemia     '   • Sick sinus syndrome (CMS/HCC)        Past Surgical History:  Past Surgical History:   Procedure Laterality Date   • CARDIAC PACEMAKER PLACEMENT     • CARDIAC PACEMAKER PLACEMENT     • CORONARY ANGIOPLASTY     • CORONARY ANGIOPLASTY WITH STENT PLACEMENT     • OTHER SURGICAL HISTORY      CATARACT SURGERY   • OTHER SURGICAL HISTORY      ROTATOR CUFF REPAIR         Home Meds:  Medications Prior to Admission   Medication Sig Dispense Refill Last Dose   • amoxicillin-clavulanate (AUGMENTIN) 875-125 MG per tablet Take 1 tablet by mouth 2 (Two) Times a Day. 14 tablet 0    • aspirin 325 MG tablet Take 1 tablet by mouth daily.      • atorvastatin (LIPITOR) 20 MG tablet Take 20 mg by mouth daily.      • Cholecalciferol (VITAMIN D3) 5000 UNITS tablet Take by mouth.      • Cyanocobalamin (B-12) 1000 MCG capsule Take 1 capsule by mouth.      • doxycycline (MONODOX) 100 MG capsule Take 1 capsule by mouth 2 (Two) Times a Day. 14 capsule 0    • hydroCHLOROthiazide (MICROZIDE) 12.5 MG capsule Take 1 capsule by mouth Daily. 90 capsule 2    • hydrocortisone (ANUSOL-HC) 25 MG suppository Hydrocortisone Acetate 25 MG Rectal Suppository; Patient Sig:  "Hydrocortisone Acetate 25 MG Rectal Suppository INSERT ONE SUPPOSITORY RECTALLY TWICE DAILY AS NEEDED; 12; 4; 19-Nov-2013; Active      • metoprolol tartrate (Lopressor) 50 MG tablet Take 1 tablet by mouth 2 (Two) Times a Day. 60 tablet 4    • niacin 500 MG tablet Take 1 tablet by mouth daily.      • nitroglycerin (NITROSTAT) 0.4 MG SL tablet Place 0.4 mg under the tongue.      • pantoprazole (PROTONIX) 40 MG EC tablet Take 1 tablet by mouth daily.      • saccharomyces boulardii (Florastor) 250 MG capsule Take 1 capsule by mouth 2 (Two) Times a Day. 20 capsule 0    • trospium (SANCTURA) 20 MG tablet Take 20 mg by mouth 2 (Two) Times a Day.      • valsartan (DIOVAN) 160 MG tablet Take 80 mg by mouth Daily. Take 1/2 tablet daily          Allergies:  Allergies   Allergen Reactions   • Iodine        Social History:   Social History     Socioeconomic History   • Marital status:      Spouse name: Not on file   • Number of children: Not on file   • Years of education: Not on file   • Highest education level: Not on file   Tobacco Use   • Smoking status: Never Smoker   • Smokeless tobacco: Never Used   Substance and Sexual Activity   • Alcohol use: Yes     Comment: BEING A SOCIAL DRINKER   • Drug use: No       Family History:  Family History   Problem Relation Age of Onset   • Leukemia Father    • Heart disease Maternal Aunt    • Diabetes Daughter        Physical Exam:  /62 (BP Location: Left arm, Patient Position: Lying)   Pulse 72   Temp 97.5 °F (36.4 °C) (Oral)   Resp 20   Ht 180.3 cm (71\")   Wt 106 kg (233 lb 6.4 oz)   SpO2 98%   BMI 32.55 kg/m²  Body mass index is 32.55 kg/m². 98% 106 kg (233 lb 6.4 oz)    Physical Exam     Constitutional: Middle aged pt in bed, No acute respiratory distress, + accessory muscle use  Head: - NCAT  Eyes: No pallor, Anicteric conjunctiva, EOMI.  ENMT:  Mallampati 4, no oral thrush. Moist MM.   NECK: Trachea midline, No thyromegaly, no palpable cervical LNpathy  Heart: " RRR, no murmur. No pedal edema   Lungs: BERNADETTE +, occasional rhonchi on the right no wheezes/ crackles heard    Abdomen: Soft. No tenderness, guarding or rigidity. No palpable masses  Extremities: Extremities warm and well perfused. No cyanosis/ clubbing  Neuro: Conscious, answers appropriately, no gross focal neuro deficits  Psych: Mood and affect appropriate    PPE recommended per [facility] infectious disease Isolation protocol for the current clinical scenario(as mentioned below) was followed.     LABS:  Results from last 7 days   Lab Units 09/04/21  0402 09/03/21  1306 08/31/21  1552 08/31/21  1545   SODIUM mmol/L 131* 129*  --  124*   POTASSIUM mmol/L 3.3* 3.5  --  4.0   CHLORIDE mmol/L 95* 91*  --  87*   TOTAL CO2 mmol/L  --   --   --  24   CO2 mmol/L 25.1 26.6  --   --    BUN mg/dL 14 22  --  22   CREATININE mg/dL 0.68* 0.85  --  1.16   CALCIUM mg/dL 8.2* 9.0  --  8.2*   BILIRUBIN mg/dL  --  0.9  --  1.4*   ALK PHOS U/L  --  60  --  54   ALT (SGPT) U/L  --  107*  --  70*   AST (SGOT) U/L  --  87*  --  81*   GLUCOSE mg/dL 94 109*  --   --    WBC 10*3/mm3 5.99 7.21  --   --    HEMOGLOBIN g/dL 12.6* 13.8  --   --    HEMOGLOBIN, POC g/dL  --   --  13.4  --    PLATELETS 10*3/mm3 157 151  --   --    PROBNP pg/mL  --   --   --  1,589*   PROCALCITONIN ng/mL  --  0.15  --   --        Lab Results   Component Value Date    CALCIUM 8.2 (L) 09/04/2021       Results from last 7 days   Lab Units 09/03/21  1605 09/03/21  1306   BLOODCX  No growth at 24 hours No growth at 24 hours              Results Review:    I have reviewed the relevant laboratory results and independently reviewed the chest imaging from this hospitalization including the available echocardiogram reports personally and summarized it if/ when appropriate below    Assessment   Acute hypoxic respiratory failure  Right upper lobe pneumonia  Dysphagia  Atrial fibrillation not on anticoagulation   AAA s/p previous EVAR  SSS s/p  PPM  CAD  J CARLOS    RECOMMENDATIONS:  Patient's acute respiratory failure is likely from abnormal CAT scan changes.  Agree with the concern for pneumonia in spite of negative procalcitonin and white cell count given subjective fevers.  Not much sputum production noted.  Would finish a course of antibiotics for now.    Noted some dysphagia concerns based on SLP evaluation but pattern is not classic for aspiration.  Agree with speech therapy evaluation and recommendations    Should be okay to be discharged from my standpoint on supplemental oxygen with outpatient follow-up CAT scan in 2 to 3 months to confirm resolution given atypical presentation    Thank you for letting me participate in the care of this pleasant patient.  I have discussed my findings and recommendations with patient.     Adán Blackwell MD  9/4/2021  19:54 EDT

## 2021-09-04 NOTE — PLAN OF CARE
Problem: Adult Inpatient Plan of Care  Goal: Plan of Care Review  Outcome: Ongoing, Progressing  Flowsheets (Taken 9/4/2021 1902)  Progress: improving  Plan of Care Reviewed With: patient  Outcome Summary: Pt still on 2L NC, sats maintaining >90%. States slowly feeling better. IV abx changed. SLP evaluated. will closely monitor.   Goal Outcome Evaluation:  Plan of Care Reviewed With: patient        Progress: improving  Outcome Summary: Pt still on 2L NC, sats maintaining >90%. States slowly feeling better. IV abx changed. SLP evaluated. will closely monitor.

## 2021-09-04 NOTE — PROGRESS NOTES
Name: Scar Pérez ADMIT: 9/3/2021   : 1934  PCP: Marcelle Fields MD    MRN: [id number] LOS: 1 days   AGE/SEX: 87 y.o. male  ROOM: [facility]     Subjective   Subjective   He feels little better today. Not sure about strength yet. He states sometimes when he eats he does cough. He has a hiatal hernia according to wife.    Review of Systems   Constitutional: Negative for fever.   Respiratory: Negative for cough and shortness of breath.    Cardiovascular: Negative for chest pain.   Gastrointestinal: Negative for abdominal pain.      Objective   Objective   Vital Signs  Temp:  [97.4 °F (36.3 °C)-98.6 °F (37 °C)] 98.2 °F (36.8 °C)  Heart Rate:  [60-77] 63  Resp:  [16-18] 18  BP: (104-137)/(55-81) 135/81  SpO2:  [92 %-100 %] 95 %  on  Flow (L/min):  [2] 2;   Device (Oxygen Therapy): nasal cannula  Body mass index is 32.55 kg/m².    Physical Exam  Constitutional:       Appearance: Normal appearance. He is not toxic-appearing.      Comments: Frail elderly   HENT:      Head: Normocephalic and atraumatic.   Cardiovascular:      Rate and Rhythm: Normal rate and regular rhythm.   Pulmonary:      Effort: Pulmonary effort is normal. No respiratory distress.      Breath sounds: Decreased breath sounds present.   Abdominal:      General: Bowel sounds are normal. There is no distension.      Palpations: Abdomen is soft.      Tenderness: There is no abdominal tenderness. There is no guarding or rebound.   Musculoskeletal:         General: No swelling.      Right lower leg: No edema.      Left lower leg: No edema.   Skin:     General: Skin is warm and dry.   Neurological:      General: No focal deficit present.      Mental Status: He is alert and oriented to person, place, and time.   Psychiatric:         Mood and Affect: Mood normal.         Behavior: Behavior normal.     Results Review  I reviewed the patient's new clinical results.  Results from last 7 days   Lab Units 21  0402 21  1306 21  0742    WBC 10*3/mm3 5.99 7.21  --    HEMOGLOBIN g/dL 12.6* 13.8  --    HEMOGLOBIN, POC g/dL  --   --  13.4   PLATELETS 10*3/mm3 157 151  --      Results from last 7 days   Lab Units 09/04/21  0402 09/03/21  1306 08/31/21  1545   SODIUM mmol/L 131* 129* 124*   POTASSIUM mmol/L 3.3* 3.5 4.0   CHLORIDE mmol/L 95* 91* 87*   TOTAL CO2 mmol/L  --   --  24   CO2 mmol/L 25.1 26.6  --    BUN mg/dL 14 22 22   CREATININE mg/dL 0.68* 0.85 1.16   GLUCOSE mg/dL 94 109*  --      Lab Results   Component Value Date    ANIONGAP 10.9 09/04/2021     Estimated Creatinine Clearance: 80.6 mL/min (A) (by C-G formula based on SCr of 0.68 mg/dL (L)).    Results from last 7 days   Lab Units 09/03/21  1306 08/31/21  1545   ALBUMIN g/dL 3.00* 3.2*   BILIRUBIN mg/dL 0.9 1.4*   ALK PHOS U/L 60 54   AST (SGOT) U/L 87* 81*   ALT (SGPT) U/L 107* 70*     Results from last 7 days   Lab Units 09/04/21  0402 09/03/21  1306 08/31/21  1545   CALCIUM mg/dL 8.2* 9.0 8.2*   ALBUMIN g/dL  --  3.00* 3.2*     Results from last 7 days   Lab Units 09/03/21  1306   PROCALCITONIN ng/mL 0.15   LACTATE mmol/L 1.4     No results found for: HGBA1C, POCGLU    Scheduled Meds  aspirin, 325 mg, Oral, Daily  atorvastatin, 20 mg, Oral, Daily  azithromycin, 500 mg, Intravenous, Q24H  cefTRIAXone, 1 g, Intravenous, Q24H  vitamin D3, 5,000 Units, Oral, Daily  hydroCHLOROthiazide, 12.5 mg, Oral, Daily  metoprolol tartrate, 50 mg, Oral, BID  oxybutynin XL, 10 mg, Oral, Daily  pantoprazole, 40 mg, Oral, Daily  saccharomyces boulardii, 250 mg, Oral, BID  vitamin B-12, 1,000 mcg, Oral, Daily    Continuous Infusions   PRN Meds  •  acetaminophen  •  melatonin  •  nitroglycerin  •  ondansetron **OR** ondansetron  •  [COMPLETED] Insert peripheral IV **AND** sodium chloride     Diet  Diet Regular; Cardiac     I personally reviewed images     Assessment/Plan     Active Hospital Problems    Diagnosis  POA   • Right upper lobe pneumonia [J18.9]  Unknown   • A-fib (CMS/HCC) [I48.91]  Yes   •  Benign essential hypertension [I10]  Yes   • Arteriosclerosis of coronary artery [I25.10]  Yes   • Obstructive apnea [G47.33]  Yes      Resolved Hospital Problems   No resolved problems to display.     87 y.o. male admitted with right upper lobe pneumonia    · Right upper lobe pneumonia  · On antibiotics treating pneumonia however no fever, procalcitonin and lactate normal. WBC normal.-  · Patient states sometimes coughs after eating has a hiatal hernia ask SLP to see  · For now expand antibiotics to cover aspiration  · Repeat CT chest in 2 to 3 weeks  · Pulmonology has been consulted  · History of atrial fibrillation not on anticoagulation due to recurrent life-threatening GI bleed according to chart  · J CARLOS  · CAD  · SCDs for DVT prophylaxis  · Full code  · Discussed with patient, family and nursing staff  · Anticipate discharge home in 1-2 days. PT eval. Wife plans home possibly with home health    Ryan Sheppard MD  [facility]  09/04/21  12:02 EDT    I wore protective equipment throughout this patient encounter including a face mask, gloves, and protective eyewear.  Hand hygiene was performed before donning protective equipment and after removal when leaving the room.

## 2021-09-04 NOTE — PLAN OF CARE
Goal Outcome Evaluation:  Plan of Care Reviewed With: patient, family        Progress: no change  Outcome Summary: Pt seen for bedside swallow eval a/mild oral and suspected esophageal Dysphagia putting pt at high risk of aspiration. Rec regular diet, avoid dry/sticky foods, thin liquids, meds crushed or whole (one at a time) with thin liquid as tolerated. VFSS 9/6 or 9/7 or as outpatient. Refer to GI. ST to follow.

## 2021-09-05 PROBLEM — E87.6 HYPOPOTASSEMIA: Status: ACTIVE | Noted: 2021-09-05

## 2021-09-05 LAB
L PNEUMO1 AG UR QL IA: NEGATIVE
S PNEUM AG SPEC QL LA: NEGATIVE

## 2021-09-05 PROCEDURE — 25010000003 AMPICILLIN-SULBACTAM PER 1.5 G: Performed by: HOSPITALIST

## 2021-09-05 PROCEDURE — 97530 THERAPEUTIC ACTIVITIES: CPT

## 2021-09-05 PROCEDURE — 97162 PT EVAL MOD COMPLEX 30 MIN: CPT

## 2021-09-05 PROCEDURE — 87899 AGENT NOS ASSAY W/OPTIC: CPT | Performed by: INTERNAL MEDICINE

## 2021-09-05 RX ORDER — POTASSIUM CHLORIDE 750 MG/1
40 TABLET, FILM COATED, EXTENDED RELEASE ORAL AS NEEDED
Status: DISCONTINUED | OUTPATIENT
Start: 2021-09-05 | End: 2021-09-06 | Stop reason: HOSPADM

## 2021-09-05 RX ORDER — POTASSIUM CHLORIDE 1.5 G/1.77G
40 POWDER, FOR SOLUTION ORAL AS NEEDED
Status: DISCONTINUED | OUTPATIENT
Start: 2021-09-05 | End: 2021-09-06 | Stop reason: HOSPADM

## 2021-09-05 RX ORDER — POTASSIUM CHLORIDE 7.45 MG/ML
10 INJECTION INTRAVENOUS
Status: DISCONTINUED | OUTPATIENT
Start: 2021-09-05 | End: 2021-09-06 | Stop reason: HOSPADM

## 2021-09-05 RX ADMIN — ATORVASTATIN CALCIUM 20 MG: 20 TABLET, FILM COATED ORAL at 08:56

## 2021-09-05 RX ADMIN — AMPICILLIN SODIUM AND SULBACTAM SODIUM 3 G: 2; 1 INJECTION, POWDER, FOR SOLUTION INTRAMUSCULAR; INTRAVENOUS at 13:25

## 2021-09-05 RX ADMIN — METOPROLOL TARTRATE 50 MG: 50 TABLET, FILM COATED ORAL at 20:16

## 2021-09-05 RX ADMIN — Medication 5000 UNITS: at 08:56

## 2021-09-05 RX ADMIN — AMPICILLIN SODIUM AND SULBACTAM SODIUM 3 G: 2; 1 INJECTION, POWDER, FOR SOLUTION INTRAMUSCULAR; INTRAVENOUS at 08:55

## 2021-09-05 RX ADMIN — Medication 1000 MCG: at 08:56

## 2021-09-05 RX ADMIN — METOPROLOL TARTRATE 50 MG: 50 TABLET, FILM COATED ORAL at 08:57

## 2021-09-05 RX ADMIN — Medication 250 MG: at 08:56

## 2021-09-05 RX ADMIN — POTASSIUM CHLORIDE 40 MEQ: 750 TABLET, EXTENDED RELEASE ORAL at 21:04

## 2021-09-05 RX ADMIN — Medication 250 MG: at 20:16

## 2021-09-05 RX ADMIN — AMPICILLIN SODIUM AND SULBACTAM SODIUM 3 G: 2; 1 INJECTION, POWDER, FOR SOLUTION INTRAMUSCULAR; INTRAVENOUS at 20:16

## 2021-09-05 RX ADMIN — ASPIRIN 325 MG: 325 TABLET ORAL at 08:56

## 2021-09-05 RX ADMIN — OXYBUTYNIN CHLORIDE 10 MG: 10 TABLET, EXTENDED RELEASE ORAL at 08:56

## 2021-09-05 RX ADMIN — HYDROCHLOROTHIAZIDE 12.5 MG: 12.5 CAPSULE ORAL at 08:56

## 2021-09-05 RX ADMIN — AMPICILLIN SODIUM AND SULBACTAM SODIUM 3 G: 2; 1 INJECTION, POWDER, FOR SOLUTION INTRAMUSCULAR; INTRAVENOUS at 02:02

## 2021-09-05 RX ADMIN — PANTOPRAZOLE SODIUM 40 MG: 40 TABLET, DELAYED RELEASE ORAL at 08:56

## 2021-09-05 NOTE — THERAPY EVALUATION
Patient Name: Scar Pérez  : 1934    MRN: [id number]                              Today's Date: 2021       Admit Date: 9/3/2021    Visit Dx:     ICD-10-CM ICD-9-CM   1. Community acquired pneumonia, unspecified laterality  J18.9 486     Patient Active Problem List   Diagnosis   • Absolute anemia   • A-fib (CMS/HCC)   • Arteriosclerosis of coronary artery   • Bleeding gastrointestinal   • Lipoma of skin and subcutaneous tissue   • Obstructive apnea   • Hyperkalemia   • Benign essential hypertension   • Pure hypercholesterolemia   • Impaired fasting glucose   • Right upper lobe pneumonia   • Hypopotassemia     Past Medical History:   Diagnosis Date   • Abdominal aortic aneurysm (CMS/HCC)    • Benign essential hypertension    • Coronary atherosclerosis    • Esophageal reflux    • Hyperglycemia    • Hyperlipidemia     '   • Sick sinus syndrome (CMS/HCC)      Past Surgical History:   Procedure Laterality Date   • CARDIAC PACEMAKER PLACEMENT     • CARDIAC PACEMAKER PLACEMENT     • CORONARY ANGIOPLASTY     • CORONARY ANGIOPLASTY WITH STENT PLACEMENT     • OTHER SURGICAL HISTORY      CATARACT SURGERY   • OTHER SURGICAL HISTORY      ROTATOR CUFF REPAIR      General Information     Row Name 21 1457          Physical Therapy Time and Intention    Document Type  evaluation  -     Mode of Treatment  physical therapy;individual therapy  -     Row Name 21 1457          General Information    Patient Profile Reviewed  yes  -     Prior Level of Function  independent:  -     Existing Precautions/Restrictions  fall;oxygen therapy device and L/min  -     Barriers to Rehab  none identified  -     Row Name 21 1455          Living Environment    Lives With  spouse  -     Row Name 21 1457          Home Main Entrance    Number of Stairs, Main Entrance  one  -     Row Name 21 1457          Stairs Within Home, Primary    Number of Stairs, Within Home, Primary  none  -     Row  Name 09/05/21 1457          Cognition    Orientation Status (Cognition)  oriented x 3  -     Row Name 09/05/21 1457          Safety Issues, Functional Mobility    Safety Issues Affecting Function (Mobility)  awareness of need for assistance;insight into deficits/self-awareness  -     Impairments Affecting Function (Mobility)  shortness of breath;strength;endurance/activity tolerance  -       User Key  (r) = Recorded By, (t) = Taken By, (c) = Cosigned By    Initials Name Provider Type     Isa Goodwin PT Physical Therapist        Mobility     Row Name 09/05/21 1457          Bed Mobility    Bed Mobility  sit-supine  -     Sit-Supine Meadow (Bed Mobility)  verbal cues;1 person assist;standby assist  -     Comment (Bed Mobility)  sitting EOB at PT arrival  -     Row Name 09/05/21 1457          Sit-Stand Transfer    Sit-Stand [place] (Transfers)  standby assist;verbal cues;nonverbal cues (demo/gesture);1 person assist  -     Row Name 09/05/21 1457          Gait/Stairs (Locomotion)    [place]ow Level (Gait)  contact guard;verbal cues;nonverbal cues (demo/gesture);1 person assist  -     Assistive Device (Gait)  walker, front-wheeled  -     Distance in Feet (Gait)  75'  -     Deviations/Abnormal Patterns (Gait)  gait speed decreased;stride length decreased  -     Bilateral Gait Deviations  forward flexed posture  -     Comment (Gait/Stairs)  slwo steady gait, forward flexed posture - cued to improve upright standing to improve lung expansion  -       User Key  (r) = Recorded By, (t) = Taken By, (c) = Cosigned By    Initials Name Provider Type     Isa Goodwin, PT Physical Therapist        Obj/Interventions     Row Name 09/05/21 1458          Range of Motion Comprehensive    General Range of Motion  no range of motion deficits identified  -     Row Name 09/05/21 1458          Strength Comprehensive (MMT)    General Manual Muscle Testing (MMT) Assessment  lower extremity  strength deficits identified  -     Comment, General Manual Muscle Testing (MMT) Assessment  generalized weakness  -MH     Row Name 09/05/21 1458          Balance    Balance Assessment  standing static balance  -     Static Standing Balance  WFL;standing  -     Balance Interventions  standing  -MH     Row Name 09/05/21 1458          Sensory Assessment (Somatosensory)    Sensory Assessment (Somatosensory)  sensation intact  -       User Key  (r) = Recorded By, (t) = Taken By, (c) = Cosigned By    Initials Name Provider Type     Isa Goodwin, PT Physical Therapist        Goals/Plan     Row Name 09/05/21 1504          Bed Mobility Goal 1 (PT)    Activity/Assistive Device (Bed Mobility Goal 1, PT)  bed mobility activities, all  -     Ida Level/Cues Needed (Bed Mobility Goal 1, PT)  standby assist  -     Time Frame (Bed Mobility Goal 1, PT)  1 week  -MH     Row Name 09/05/21 1504          Transfer Goal 1 (PT)    Activity/Assistive Device (Transfer Goal 1, PT)  transfers, all  -MH     Ida Level/Cues Needed (Transfer Goal 1, PT)  independent  -     Time Frame (Transfer Goal 1, PT)  1 week  -MH     Row Name 09/05/21 1504          Gait Training Goal 1 (PT)    Activity/Assistive Device (Gait Training Goal 1, PT)  gait (walking locomotion)  -     Ida Level (Gait Training Goal 1, PT)  standby assist  -     Distance (Gait Training Goal 1, PT)  150  -MH     Time Frame (Gait Training Goal 1, PT)  1 week  -       User Key  (r) = Recorded By, (t) = Taken By, (c) = Cosigned By    Initials Name Provider Type     Isa Goodwin, PT Physical Therapist        Clinical Impression     Row Name 09/05/21 7174          Pain    Additional Documentation  Pain Scale: FACES Pre/Post-Treatment (Group)  -MH     Row Name 09/05/21 1459          Pain Scale: FACES Pre/Post-Treatment    Pain: FACES Scale, Pretreatment  0-->no hurt  -     Posttreatment Pain Rating  0-->no hurt  -MH     Row Name  09/05/21 2447          Plan of Care Review    Plan of Care Reviewed With  patient  -     Outcome Summary  Pt. is a 86 y/o male admitted due to progressive SOB and fatigue, was seen at [facility] several days prior to admission with (-) COVID19 test and found to have R upper lobe pneumonia. Pt. lives with wife in single level home with 1 MEDARDO, does not typically use AD with ambulation though has been using rwx due to increased fatigue last several days. P. has elevated commode, walk in shower, and rwx at home. Pt. does not typically use O2 though on 2L supp O2 at PT arrival. Pt. required SBA for bed mobility and STS, CGA for ambulation with rwx for 75'. Trialed mobility without O2 per RN consent. Pt. O2 97 upon standing, with return to room following ambulation O2 93-95%. Informed RN, pt. was left off O2. Pt. eager to D/C, anticipate pt. to D/C home with assist as needed when medically appropriate.  -     Row Name 09/05/21 8124          Therapy Assessment/Plan (PT)    Rehab Potential (PT)  good, to achieve stated therapy goals  -     Criteria for Skilled Interventions Met (PT)  yes  -     Predicted Duration of Therapy Intervention (PT)  1 week  -     Row Name 09/05/21 1459          Vital Signs    Pre SpO2 (%)  98 2L  -MH     O2 Delivery Pre Treatment  supplemental O2  -     Intra SpO2 (%)  -- 97  -MH     O2 Delivery Intra Treatment  room air  -MH     Post SpO2 (%)  95 RN aware  -     O2 Delivery Post Treatment  room air  -     Row Name 09/05/21 1455          Positioning and Restraints    Pre-Treatment Position  in bed  -     Post Treatment Position  bed  -MH     In Bed  supine;call light within reach;encouraged to call for assist;exit alarm on;notified [facility]  -       User Key  (r) = Recorded By, (t) = Taken By, (c) = Cosigned By    Initials Name Provider Type    Isa Bernard, PT Physical Therapist        Outcome Measures     Row Name 09/05/21 9294          How much help from another person do you  currently need...    Turning from your back to your side while in flat bed without using bedrails?  4  -MH     Moving from lying on back to sitting on the side of a flat bed without bedrails?  4  -MH     Moving to and from a bed to a chair (including a wheelchair)?  3  -MH     Standing up from a chair using your arms (e.g., wheelchair, bedside chair)?  3  -MH     Climbing 3-5 steps with a railing?  3  -MH     To walk in hospital room?  3  -     AM-PAC 6 Clicks Score (PT)  20  -     Row Name 09/05/21 1505          Functional Assessment    Outcome Measure Options  AM-PAC 6 Clicks Basic Mobility (PT)  -       User Key  (r) = Recorded By, (t) = Taken By, (c) = Cosigned By    Initials Name Provider Type     Isa Goodwin, NAYANA Physical Therapist                       Physical Therapy Education                 Title: PT OT SLP Therapies (Done)     Topic: Physical Therapy (Done)     Point: Mobility training (Done)     Learning Progress Summary           Patient Acceptance, TB,E,D, VU,DU,NR by  at 9/5/2021 1505                   Point: Body mechanics (Done)     Learning Progress Summary           Patient Acceptance, TB,E,D, VU,DU,NR by  at 9/5/2021 1505                   Point: Precautions (Done)     Learning Progress Summary           Patient Acceptance, TB,E,D, VU,DU,NR by  at 9/5/2021 1505                               User Key     Initials Effective Dates Name Provider Type Discipline     05/26/20 -  Isa Goodwin, NAYANA Physical Therapist PT              PT Recommendation and Plan  Planned Therapy Interventions (PT): balance training, bed mobility training, gait training, home exercise program, stretching, strengthening, stair training, postural re-education, patient/family education, transfer training, neuromuscular re-education  Plan of Care Reviewed With: patient  Outcome Summary: Pt. is a 86 y/o male admitted due to progressive SOB and fatigue, was seen at [facility] several days prior to admission with (-)  COVID19 test and found to have R upper lobe pneumonia. Pt. lives with wife in single level home with 1 MEDARDO, does not typically use AD with ambulation though has been using rwx due to increased fatigue last several days. P. has elevated commode, walk in shower, and rwx at home. Pt. does not typically use O2 though on 2L supp O2 at PT arrival. Pt. required SBA for bed mobility and STS, CGA for ambulation with rwx for 75'. Trialed mobility without O2 per RN consent. Pt. O2 97 upon standing, with return to room following ambulation O2 93-95%. Informed RN, pt. was left off O2. Pt. eager to D/C, anticipate pt. to D/C home with assist as needed when medically appropriate.     Time Calculation:   PT Charges     Row Name 09/05/21 1506             Time Calculation    Start Time  1427  -      Stop Time  1443  -      Time Calculation (min)  16 min  -MH      PT Received On  09/05/21  -      PT - Next Appointment  09/06/21  -      PT Goal Re-Cert Due Date  09/19/21  -         Time Calculation- PT    Total Timed Code Minutes- PT  14 minute(s)  -MH         Timed Charges    35917 - PT Therapeutic Activity Minutes  14  -MH         Total Minutes    Timed Charges Total Minutes  14  -MH       Total Minutes  14  -MH        User Key  (r) = Recorded By, (t) = Taken By, (c) = Cosigned By    Initials Name Provider Type     Isa Goodwin, PT Physical Therapist        Therapy Charges for Today     Code Description Service Date Service Provider Modifiers Qty    69495807171  PT THERAPEUTIC ACT EA 15 MIN 9/5/2021 Isa Goodwin, PT GP 1    [id number] HC PT EVAL MOD COMPLEXITY 1 9/5/2021 Isa Goodwin, PT GP 1          PT G-Codes  Outcome Measure Options: AM-PAC 6 Clicks Basic Mobility (PT)  AM-PAC 6 Clicks Score (PT): 20    Isa Goodwin PT  9/5/2021

## 2021-09-05 NOTE — PLAN OF CARE
Goal Outcome Evaluation:           Progress: no change  Outcome Summary: No c/o pain. VSS on 2Lo2. Voiding per urinal. Abx as ordered per md. Labs. Currently resting in bed. Will continue to monitor.

## 2021-09-05 NOTE — PROGRESS NOTES
Name: Scar Pérez ADMIT: 9/3/2021   : 1934  PCP: Marcelle Fields MD    MRN: [id number] LOS: 2 days   AGE/SEX: 87 y.o. male  ROOM: [facility]     Subjective   Subjective    Feeling better today. Worried about going home too soon.    Review of Systems   Constitutional: Negative for fever.   Respiratory: Negative for cough and shortness of breath.    Cardiovascular: Negative for chest pain.   Gastrointestinal: Negative for abdominal pain.      Objective   Objective   Vital Signs  Temp:  [97.5 °F (36.4 °C)-98.3 °F (36.8 °C)] 97.6 °F (36.4 °C)  Heart Rate:  [65-72] 71  Resp:  [18-20] 18  BP: (116-134)/(62-91) 116/71  SpO2:  [92 %-98 %] 92 %  on  Flow (L/min):  [1-2] 1.5;   Device (Oxygen Therapy): nasal cannula  Body mass index is 32.44 kg/m².    Physical Exam  Constitutional:       Appearance: Normal appearance. He is not toxic-appearing.      Comments: Frail elderly   HENT:      Head: Normocephalic and atraumatic.   Cardiovascular:      Rate and Rhythm: Normal rate and regular rhythm.   Pulmonary:      Effort: Pulmonary effort is normal. No respiratory distress.      Breath sounds: Decreased breath sounds present.   Abdominal:      General: Bowel sounds are normal. There is no distension.      Palpations: Abdomen is soft.      Tenderness: There is no abdominal tenderness. There is no guarding or rebound.   Musculoskeletal:         General: No swelling.      Right lower leg: No edema.      Left lower leg: No edema.   Skin:     General: Skin is warm and dry.   Neurological:      General: No focal deficit present.      Mental Status: He is alert and oriented to person, place, and time.   Psychiatric:         Mood and Affect: Mood normal.         Behavior: Behavior normal.     Results Review  I reviewed the patient's new clinical results.  Results from last 7 days   Lab Units 21  0402 21  1306 21  1552   WBC 10*3/mm3 5.99 7.21  --    HEMOGLOBIN g/dL 12.6* 13.8  --    HEMOGLOBIN, POC g/dL  --    --  13.4   PLATELETS 10*3/mm3 157 151  --      Results from last 7 days   Lab Units 09/04/21  0402 09/03/21  1306 08/31/21  1545   SODIUM mmol/L 131* 129* 124*   POTASSIUM mmol/L 3.3* 3.5 4.0   CHLORIDE mmol/L 95* 91* 87*   TOTAL CO2 mmol/L  --   --  24   CO2 mmol/L 25.1 26.6  --    BUN mg/dL 14 22 22   CREATININE mg/dL 0.68* 0.85 1.16   GLUCOSE mg/dL 94 109*  --      Lab Results   Component Value Date    ANIONGAP 10.9 09/04/2021     Estimated Creatinine Clearance: 80.6 mL/min (A) (by C-G formula based on SCr of 0.68 mg/dL (L)).    Results from last 7 days   Lab Units 09/03/21  1306 08/31/21  1545   ALBUMIN g/dL 3.00* 3.2*   BILIRUBIN mg/dL 0.9 1.4*   ALK PHOS U/L 60 54   AST (SGOT) U/L 87* 81*   ALT (SGPT) U/L 107* 70*     Results from last 7 days   Lab Units 09/04/21  0402 09/03/21  1306 08/31/21  1545   CALCIUM mg/dL 8.2* 9.0 8.2*   ALBUMIN g/dL  --  3.00* 3.2*     Results from last 7 days   Lab Units 09/03/21  1306   PROCALCITONIN ng/mL 0.15   LACTATE mmol/L 1.4     No results found for: HGBA1C, POCGLU    Scheduled Meds  ampicillin-sulbactam, 3 g, Intravenous, Q6H  aspirin, 325 mg, Oral, Daily  atorvastatin, 20 mg, Oral, Daily  vitamin D3, 5,000 Units, Oral, Daily  hydroCHLOROthiazide, 12.5 mg, Oral, Daily  metoprolol tartrate, 50 mg, Oral, BID  oxybutynin XL, 10 mg, Oral, Daily  pantoprazole, 40 mg, Oral, Daily  saccharomyces boulardii, 250 mg, Oral, BID  vitamin B-12, 1,000 mcg, Oral, Daily    Continuous Infusions   PRN Meds  •  acetaminophen  •  melatonin  •  nitroglycerin  •  ondansetron **OR** ondansetron  •  [COMPLETED] Insert peripheral IV **AND** sodium chloride     Diet  Diet Regular; Cardiac      Assessment/Plan     Active Hospital Problems    Diagnosis  POA   • **Right upper lobe pneumonia [J18.9]  Unknown   • Hypopotassemia [E87.6]  Unknown   • A-fib (CMS/HCC) [I48.91]  Yes   • Benign essential hypertension [I10]  Yes   • Arteriosclerosis of coronary artery [I25.10]  Yes   • Obstructive apnea  [G47.33]  Yes      Resolved Hospital Problems   No resolved problems to display.     87 y.o. male admitted with right upper lobe pneumonia    · Right upper lobe pneumonia  · Appreciate pulmonology  · On antibiotics treating pneumonia potentially aspiration however felt less likely, probably home on Augmentin  · VFSS tomorrow  · Repeat CT chest in 6 to 8 weeks  · Increase activity/PT  · History of atrial fibrillation not on anticoagulation due to recurrent life-threatening GI bleed according to chart  · Hypokalemia: Replace  · J CARLOS  · CAD  · SCDs for DVT prophylaxis  · Full code  · Discussed with patient, family and nursing staff  · Anticipate discharge home tomorrow. After VFSS    Ryan Sheppard MD  [facility]  09/05/21  13:31 EDT    I wore protective equipment throughout this patient encounter including a face mask, gloves, and protective eyewear.  Hand hygiene was performed before donning protective equipment and after removal when leaving the room.

## 2021-09-05 NOTE — PLAN OF CARE
Problem: Adult Inpatient Plan of Care  Goal: Plan of Care Review  Outcome: Ongoing, Progressing  Flowsheets  Taken 9/5/2021 1721 by Jeane Adames, RN  Progress: improving  Outcome Summary: Pt feeling better, able to wean to RA, slightly low sats w/activity. worked w/PT, able to walk down hallway w/staff assistance using walker. Encouraged increased activity. Using urinal independently, appetite improving. to have VFSS tomorrow. IV abx as ordered. will closey monitor.  Taken 9/5/2021 1505 by Isa Goodwin, PT  Plan of Care Reviewed With: patient   Goal Outcome Evaluation:  Plan of Care Reviewed With: patient        Progress: improving  Outcome Summary: Pt feeling better, able to wean to RA, slightly low sats w/activity. worked w/PT, able to walk down hallway w/staff assistance using walker. Encouraged increased activity. Using urinal independently, appetite improving. to have VFSS tomorrow. IV abx as ordered. will closey monitor.

## 2021-09-05 NOTE — PROGRESS NOTES
"      [facility]         Dr Cavazos Sayied   LOS: 2 days   Patient Care Team:  Marcelle Fields MD as PCP - General (Internal Medicine)  Sami Fay MD as Consulting Physician (Orthopedic Surgery)  Jeannie Palomo MD (Dermatology)  Yomi Delgadillo DPM as Consulting Physician (Podiatry)  Devon Florez MD (Vascular Surgery)  Dorian Connor MD as Consulting Physician (Cardiology)    Chief Complaint: Acute hypoxemic respiratory failure with right upper lobe pneumonia dysphagia    Interval History: Resting comfortably feels better.  Remains on 2 L oxygen nasal cannula.  Home CPAP at bedside not set up yet    REVIEW OF SYSTEMS:   CARDIOVASCULAR: No chest pain, chest pressure or chest discomfort. No palpitations or edema.   RESPIRATORY: Short of breath with activity  GASTROINTESTINAL: No anorexia, nausea, vomiting or diarrhea. No abdominal pain or blood.   HEMATOLOGIC: No bleeding or bruising.     Ventilator/Non-Invasive Ventilation Settings (From admission, onward)    None            Vital Signs  Temp:  [97.5 °F (36.4 °C)-98.3 °F (36.8 °C)] 97.6 °F (36.4 °C)  Heart Rate:  [65-72] 71  Resp:  [18-20] 18  BP: (116-134)/(62-91) 116/71    Intake/Output Summary (Last 24 hours) at 9/5/2021 1305  Last data filed at 9/5/2021 1037  Gross per 24 hour   Intake 540 ml   Output 1750 ml   Net -1210 ml     Flowsheet Rows      First Filed Value   Admission Height  180.3 cm (71\") Documented at 09/03/2021 1608   Admission Weight  108 kg (238 lb) Documented at 09/03/2021 1608          Physical Exam:  Patient is examined using the personal protective equipment as per guidelines from infection control for this particular patient as enacted.  Hand hygiene was performed before and after patient interaction.   General Appearance:    Alert, cooperative, in no acute distress.  Following simple commands  Neck midline trachea, no thyromegaly   Lungs:    Diminished breath sounds with crackles on " the basis    Heart:    Regular rhythm and normal rate, normal S1 and S2, no            murmur, no gallop, no rub, no click   Chest Wall:    No abnormalities observed   Abdomen:     Normal bowel sounds, no masses, no organomegaly, soft        nontender, nondistended, no guarding, no rebound                tenderness   Extremities:   Moves all extremities well, no edema, no cyanosis, no             redness  CNS no focal neurological deficits normal sensory exam  Skin no rashes no nodules  Musculoskeletal no cyanosis no clubbing normal range of motion     Results Review:        Results from last 7 days   Lab Units 09/04/21  0402 09/03/21  1306 09/03/21  1306 08/31/21  1545   SODIUM mmol/L 131*  --  129* 124*   POTASSIUM mmol/L 3.3*  --  3.5 4.0   CHLORIDE mmol/L 95*  --  91* 87*   TOTAL CO2 mmol/L  --   --   --  24   CO2 mmol/L 25.1  --  26.6  --    BUN mg/dL 14  --  22 22   CREATININE mg/dL 0.68*  --  0.85 1.16   GLUCOSE mg/dL 94   < > 109*  --    CALCIUM mg/dL 8.2*  --  9.0 8.2*    < > = values in this interval not displayed.         Results from last 7 days   Lab Units 09/04/21  0402 09/03/21  1306 08/31/21  1552   WBC 10*3/mm3 5.99 7.21  --    HEMOGLOBIN g/dL 12.6* 13.8  --    HEMOGLOBIN, POC g/dL  --   --  13.4   HEMATOCRIT % 37.1* 40.3  --    HEMATOCRIT POC %  --   --  38.4   PLATELETS 10*3/mm3 157 151  --                            I reviewed the patient's new clinical results.  I personally viewed and interpreted the patient's chest x-ray.        Medication Review:   ampicillin-sulbactam, 3 g, Intravenous, Q6H  aspirin, 325 mg, Oral, Daily  atorvastatin, 20 mg, Oral, Daily  vitamin D3, 5,000 Units, Oral, Daily  hydroCHLOROthiazide, 12.5 mg, Oral, Daily  metoprolol tartrate, 50 mg, Oral, BID  oxybutynin XL, 10 mg, Oral, Daily  pantoprazole, 40 mg, Oral, Daily  saccharomyces boulardii, 250 mg, Oral, BID  vitamin B-12, 1,000 mcg, Oral, Daily             ASSESSMENT:   Acute hypoxic respiratory failure  Right upper  lobe pneumonia  Dysphagia  Atrial fibrillation not on anticoagulation   AAA s/p previous EVAR  SSS s/p PPM  CAD  J CARLOS    PLAN:  Right upper lobe consolidation/atelectasis.  Improving with current antibiotics.  Would recommend follow-up CT chest in 6 to 8 weeks to ensure complete resolution  Oxygen to keep sats above 90% wean as tolerated  Swallow evaluation per speech  Home CPAP to be set up at bedside.  Discussed with wife  Mobilize and ambulate.      Dirk Jacques MD  09/05/21  13:05 EDT

## 2021-09-05 NOTE — CONSULTS
"Adult Nutrition  Assessment/PES    Patient Name:  Scar Pérez  YOB: 1934  MRN: [id number]  Admit Date:  9/3/2021    Assessment Date:  9/5/2021    Comments:  Nurse admission screen consult, MST 3    EMR reviewed. Pt admitted with and being treated for RUL pneumonia (covid negative). His weight is down from baseline of 240 lb to 232 lb now (-3% change). BMI 32.4. Skin intact, labs reviewed. SLP amalia noted - sent message to diet office to add the no dry, sticky textures onto current diet order per their recommendations. VFSS possibly on 9/6 or 9/7. PO intake has been ~50% most meals thus far. Will add mighty shakes BID, continue to monitor clinical course.     Reason for Assessment     Row Name 09/05/21 1054          Reason for Assessment    Reason For Assessment  nurse/nurse practitioner consult     Diagnosis  cardiac disease;pulmonary disease Afib, RUL pna, J CARLOS, HTN     Identified At Risk by Screening Criteria  MST SCORE 2+         Nutrition/Diet History     Row Name 09/05/21 1054          Nutrition/Diet History    Typical Food/Fluid Intake  Intake 50% or less thus far. Speech to see (r/o aspiration) though its noted he has abnormal CAT scan changes     Factors Affecting Nutritional Intake  compromised airway         Anthropometrics     Row Name 09/05/21 1056 09/05/21 0540       Anthropometrics    Height  180.3 cm (71\")  --    Weight  --  106 kg (232 lb 9.6 oz)       Admit Weight    Admit Weight  105 kg (232 lb)  --       Ideal Body Weight (IBW)    Ideal Body Weight (IBW) (kg)  79.27  --       Usual Body Weight (UBW)    Usual Body Weight  109 kg (240 lb) 9/2020  --    Weight Loss  unintentional  --    Weight Loss Time Frame  8 lb acutely (over last month) - 3% change  --       Body Mass Index (BMI)    BMI Assessment  BMI 30-34.9: obesity grade I  --        Labs/Tests/Procedures/Meds     Row Name 09/05/21 1058          Labs/Procedures/Meds    Lab Results Reviewed  reviewed     Lab Results " "Comments  Na, K, ALT, Cl, Alb, h/h        Diagnostic Tests/Procedures    Diagnostic Test/Procedure Reviewed  reviewed     Diagnostic Test/Procedures Comments  ST \"Pt seen for bedside swallow eval a/mild oral and suspected esophageal Dysphagia putting pt at high risk of aspiration. Rec regular diet, avoid dry/sticky foods, thin liquids, meds crushed or whole (one at a time) with thin liquid as tolerated. VFSS 9/6 or 9/7 or as outpatient.\"        Medications    Pertinent Medications Reviewed  reviewed     Pertinent Medications Comments  abx, Vit D, HCTZ, protonix, B12, florastor         Physical Findings     Row Name 09/05/21 1100          Physical Findings    Overall Physical Appearance  on oxygen therapy;edematous     Oral/Mouth Cavity  poor dentition     Skin  other (see comments) B 19, intact         Estimated/Assessed Needs     Row Name 09/05/21 1100 09/05/21 1056       Calculation Measurements    Weight Used For Calculations  105 kg (232 lb)  --    Height  --  180.3 cm (71\")       Estimated/Assessed Needs    Additional Documentation  KCAL/KG (Group);Protein Requirements (Group);Fluid Requirements (Group)  --       KCAL/KG    KCAL/KG  25 Kcal/Kg (kcal);20 Kcal/Kg (kcal)  --    20 Kcal/Kg (kcal)  2104.7  --    25 Kcal/Kg (kcal)  2630.875  --       Protein Requirements    Weight Used For Protein Calculations  105 kg (232 lb)  --    Est Protein Requirement Amount (gms/kg)  1.0 gm protein  --    Estimated Protein Requirements (gms/day)  105.24  --       Fluid Requirements    Fluid Requirements (mL/day)  2100  --    Estimated Fluid Requirement Method  RDA Method  --    RDA Method (mL)  2100  --        Nutrition Prescription Ordered     Row Name 09/05/21 1101          Nutrition Prescription PO    Current PO Diet  Regular     Fluid Consistency  Thin     Common Modifiers  Cardiac         Evaluation of Received Nutrient/Fluid Intake     Row Name 09/05/21 1102          PO Evaluation    Number of Days PO Intake Evaluated  2 " days     Number of Meals  4     % PO Intake  25-50%               Problem/Interventions:  Problem 1     Row Name 09/05/21 1102          Nutrition Diagnoses Problem 1    Problem 1  Predicted Suboptimal Intake     Etiology (related to)  Medical Diagnosis;Factors Affecting Nutrition     Pulmonary/Critical Care  Pneumonia     Oral  Swallowing Difficulty     Signs/Symptoms (evidenced by)  SLP/Swallow eval;Report/Observation     Other Comment  no dry, sticky textures recommended - sent message via [employer] to diet office to add onto diet order     Swallow eval status  Done     Type of SLP Evaluation  Bedside               Intervention Goal     Row Name 09/05/21 1104          Intervention Goal    General  Maintain nutrition;Disease management/therapy;Reduce/improve symptoms     PO  Tolerate PO;Increase intake;PO intake (%)     PO Intake %  75 %     Weight  Maintain weight         Nutrition Intervention     Row Name 09/05/21 1104          Nutrition Intervention    RD/Tech Action  Recommend/ordered;Follow Tx progress;Care plan reviewd;Encourage intake;Interview for preference     Recommended/Ordered  Supplement         Nutrition Prescription     Row Name 09/05/21 1105          Nutrition Prescription PO    PO Prescription  Begin/change supplement     Supplement  Mighty Shake     Supplement Frequency  2 times a day         Education/Evaluation     Row Name 09/05/21 1105          Education    Education  Will Instruct as appropriate        Monitor/Evaluation    Monitor  Per protocol           Electronically signed by:  Kristal rGuber RD  09/05/21 11:05 EDT

## 2021-09-05 NOTE — PLAN OF CARE
Goal Outcome Evaluation:  Plan of Care Reviewed With: patient           Outcome Summary: Pt. is a 86 y/o male admitted due to progressive SOB and fatigue, was seen at [facility] several days prior to admission with (-) COVID19 test and found to have R upper lobe pneumonia. Pt. lives with wife in single level home with 1 MEDARDO, does not typically use AD with ambulation though has been using rwx due to increased fatigue last several days. P. has elevated commode, walk in shower, and rwx at home. Pt. does not typically use O2 though on 2L supp O2 at PT arrival. Pt. required SBA for bed mobility and STS, CGA for ambulation with rwx for 75'. Trialed mobility without O2 per RN consent. Pt. O2 97 upon standing, with return to room following ambulation O2 93-95%. Informed RN, pt. was left off O2. Pt. eager to D/C, anticipate pt. to D/C home with assist as needed when medically appropriate.    Patient was wearing a face mask during this therapy encounter. Therapist used appropriate personal protective equipment including eye protection, mask, and gloves.  Mask used was standard procedure mask. Appropriate PPE was worn during the entire therapy session. Hand hygiene was completed before and after therapy session. Patient is not in enhanced droplet precautions.

## 2021-09-06 ENCOUNTER — READMISSION MANAGEMENT (OUTPATIENT)
Dept: CALL CENTER | Facility: HOSPITAL | Age: 86
End: 2021-09-06

## 2021-09-06 ENCOUNTER — APPOINTMENT (OUTPATIENT)
Dept: GENERAL RADIOLOGY | Facility: HOSPITAL | Age: 86
End: 2021-09-06

## 2021-09-06 VITALS
DIASTOLIC BLOOD PRESSURE: 63 MMHG | TEMPERATURE: 97.7 F | WEIGHT: 233.8 LBS | HEIGHT: 71 IN | SYSTOLIC BLOOD PRESSURE: 120 MMHG | OXYGEN SATURATION: 95 % | BODY MASS INDEX: 32.73 KG/M2 | HEART RATE: 62 BPM | RESPIRATION RATE: 20 BRPM

## 2021-09-06 PROCEDURE — 25010000003 AMPICILLIN-SULBACTAM PER 1.5 G: Performed by: HOSPITALIST

## 2021-09-06 PROCEDURE — 74230 X-RAY XM SWLNG FUNCJ C+: CPT

## 2021-09-06 PROCEDURE — 97530 THERAPEUTIC ACTIVITIES: CPT

## 2021-09-06 PROCEDURE — 92611 MOTION FLUOROSCOPY/SWALLOW: CPT

## 2021-09-06 RX ORDER — AMOXICILLIN AND CLAVULANATE POTASSIUM 875; 125 MG/1; MG/1
1 TABLET, FILM COATED ORAL 2 TIMES DAILY
Qty: 10 TABLET | Refills: 0 | Status: SHIPPED | OUTPATIENT
Start: 2021-09-06 | End: 2021-09-11

## 2021-09-06 RX ADMIN — BARIUM SULFATE 700 MG: 700 TABLET ORAL at 10:14

## 2021-09-06 RX ADMIN — ASPIRIN 325 MG: 325 TABLET ORAL at 09:28

## 2021-09-06 RX ADMIN — BARIUM SULFATE 1 TEASPOON(S): 0.6 CREAM ORAL at 10:12

## 2021-09-06 RX ADMIN — BARIUM SULFATE 55 ML: 0.81 POWDER, FOR SUSPENSION ORAL at 10:16

## 2021-09-06 RX ADMIN — PANTOPRAZOLE SODIUM 40 MG: 40 TABLET, DELAYED RELEASE ORAL at 09:27

## 2021-09-06 RX ADMIN — ATORVASTATIN CALCIUM 20 MG: 20 TABLET, FILM COATED ORAL at 09:28

## 2021-09-06 RX ADMIN — Medication 1000 MCG: at 09:28

## 2021-09-06 RX ADMIN — Medication 5000 UNITS: at 09:27

## 2021-09-06 RX ADMIN — POTASSIUM CHLORIDE 40 MEQ: 750 TABLET, EXTENDED RELEASE ORAL at 01:39

## 2021-09-06 RX ADMIN — METOPROLOL TARTRATE 50 MG: 50 TABLET, FILM COATED ORAL at 09:28

## 2021-09-06 RX ADMIN — OXYBUTYNIN CHLORIDE 10 MG: 10 TABLET, EXTENDED RELEASE ORAL at 09:27

## 2021-09-06 RX ADMIN — Medication 250 MG: at 09:28

## 2021-09-06 RX ADMIN — AMPICILLIN SODIUM AND SULBACTAM SODIUM 3 G: 2; 1 INJECTION, POWDER, FOR SOLUTION INTRAMUSCULAR; INTRAVENOUS at 01:39

## 2021-09-06 RX ADMIN — AMPICILLIN SODIUM AND SULBACTAM SODIUM 3 G: 2; 1 INJECTION, POWDER, FOR SOLUTION INTRAMUSCULAR; INTRAVENOUS at 07:48

## 2021-09-06 RX ADMIN — BARIUM SULFATE 65 ML: 960 POWDER, FOR SUSPENSION ORAL at 10:10

## 2021-09-06 NOTE — PLAN OF CARE
Goal Outcome Evaluation:  Plan of Care Reviewed With: patient        Progress: improving  Outcome Summary: VSS. on RA, wore CPAP all night. IV abx. video swallow today. poss d/c. K replaced

## 2021-09-06 NOTE — PLAN OF CARE
Problem: Fluid Imbalance (Pneumonia)  Goal: Fluid Balance  Outcome: Met     Problem: Infection (Pneumonia)  Goal: Resolution of Infection Signs and Symptoms  Outcome: Met     Problem: Respiratory Compromise (Pneumonia)  Goal: Effective Oxygenation and Ventilation  Outcome: Met     Problem: Fall Injury Risk  Goal: Absence of Fall and Fall-Related Injury  Outcome: Met     Problem: Hypertension Comorbidity  Goal: Blood Pressure in Desired Range  Outcome: Met     Problem: Pain Chronic (Persistent) (Comorbidity Management)  Goal: Acceptable Pain Control and Functional Ability  Outcome: Met     Problem: Adult Inpatient Plan of Care  Goal: Plan of Care Review  Outcome: Met  Flowsheets (Taken 9/6/2021 4723)  Progress: improving  Plan of Care Reviewed With: patient  Goal: Patient-Specific Goal (Individualized)  Outcome: Met  Goal: Absence of Hospital-Acquired Illness or Injury  Outcome: Met  Goal: Optimal Comfort and Wellbeing  Outcome: Met  Goal: Readiness for Transition of Care  Outcome: Met   Goal Outcome Evaluation:  Plan of Care Reviewed With: patient        Progress: improving

## 2021-09-06 NOTE — PLAN OF CARE
Goal Outcome Evaluation:  Plan of Care Reviewed With: patient        Progress: no change  Outcome Summary: Pt completed VFSS w/mild oropharyngeal dysphagia with esophageal component. No aspiration witnessed however pt at risk with esophageal residue. Rec reg diet, thin liquids, avoid dry/sticky foods, alternate liquids/solids, reflux precautions, meds crushed with thin liquid as tolerated. Pt educated and reminded to follow up with GI. St to follow for diet tolerance.

## 2021-09-06 NOTE — OUTREACH NOTE
Prep Survey      Responses   [facility] facility patient discharged from?  [facility]   Is LACE score < 7 ?  Yes   Emergency Room discharge w/ pulse ox?  No   Eligibility  [facility]   Date of Admission  09/03/21   Date of Discharge  09/06/21   Discharge Disposition  Home or Self Care   Discharge diagnosis  PNA, A-fib,    Does the patient have one of the following disease processes/diagnoses(primary or secondary)?  COPD/Pneumonia   Does the patient have Home health ordered?  No   Is there a DME ordered?  No   Prep survey completed?  Yes          Sammie Aguilera RN

## 2021-09-06 NOTE — DISCHARGE SUMMARY
[facility]    Date of Discharge:  9/6/2021    PCP: Marcelle Fields MD    Discharge Diagnosis:   Active Hospital Problems    Diagnosis  POA   • **Right upper lobe pneumonia [J18.9]  Unknown   • Hypopotassemia [E87.6]  Unknown   • A-fib (CMS/HCC) [I48.91]  Yes   • Benign essential hypertension [I10]  Yes   • Arteriosclerosis of coronary artery [I25.10]  Yes   • Obstructive apnea [G47.33]  Yes      Resolved Hospital Problems   No resolved problems to display.          Consults     Date and Time Order Name Status Description    9/3/2021  5:50 PM Inpatient Pulmonology Consult Completed     9/3/2021  3:02 PM [facility] (on-call MD unless specified) Details Completed         Hospital Course  87 y.o. male admitted with right upper lobe pneumonia. He is started on antibiotics covering potential aspiration pneumonia. His symptoms improved. Pulmonology recommend that he finish the course of antibiotics, suspect he did not have enough time to improve initially on antibiotics prescribed as an outpatient. Pulmonology recommend a follow-up CT scan in 6 to 8 weeks to confirm resolution given his atypical presentation.    He had a video swallow study today that showed mild oropharyngeal dysphagia with esophageal component. No aspiration was witnessed however the patient was felt to be at risk with esophageal residue. Speech recommended regular diet, thin liquids, avoid dry sticky foods, alternate solids/liquids, reflux precautions, meds crushed with thin liquids as tolerated. They recommended that he follow-up with GI.    He has a history of atrial fibrillation however he is not on anticoagulation due to recurrent life-threatening GI bleed according to the chart.    I discussed the patient's findings and my recommendations with patient and nursing staff.    Condition on Discharge: Improved.     Temp:  [97.4 °F (36.3 °C)-98.1 °F (36.7 °C)] 98.1 °F (36.7 °C)  Heart Rate:  [59-82]  62  Resp:  [18] 18  BP: (114-133)/(61-73) 133/72  Body mass index is 32.61 kg/m².    Physical Exam  Constitutional:       Appearance: Normal appearance.   HENT:      Head: Normocephalic and atraumatic.   Cardiovascular:      Rate and Rhythm: Normal rate and regular rhythm.   Pulmonary:      Effort: Pulmonary effort is normal. No respiratory distress.      Breath sounds: Normal breath sounds. No wheezing or rhonchi.   Abdominal:      General: Bowel sounds are normal. There is no distension.      Palpations: Abdomen is soft.      Tenderness: There is no abdominal tenderness. There is no guarding or rebound.   Musculoskeletal:         General: No swelling.      Right lower leg: No edema.      Left lower leg: No edema.   Skin:     General: Skin is warm and dry.   Neurological:      General: No focal deficit present.      Mental Status: He is alert and oriented to person, place, and time.   Psychiatric:         Mood and Affect: Mood normal.         Behavior: Behavior normal.     While in the room and during my examination of the patient I wore gloves, mask, eye protection.  I washed my hands before and after this patient encounter.     Disposition: Home or Self Care       Discharge Medications      Continue These Medications      Instructions Start Date   amoxicillin-clavulanate 875-125 MG per tablet  Commonly known as: Augmentin   1 tablet, Oral, 2 Times Daily      aspirin 325 MG tablet   1 tablet, Oral, Daily      atorvastatin 20 MG tablet  Commonly known as: LIPITOR   20 mg, Oral, Daily      B-12 1000 MCG capsule   1 capsule, Oral      hydroCHLOROthiazide 12.5 MG capsule  Commonly known as: MICROZIDE   12.5 mg, Oral, Daily      hydrocortisone 25 MG suppository  Commonly known as: ANUSOL-HC   Hydrocortisone Acetate 25 MG Rectal Suppository; Patient Sig: Hydrocortisone Acetate 25 MG Rectal Suppository INSERT ONE SUPPOSITORY RECTALLY TWICE DAILY AS NEEDED; 12; 4; 19-Nov-2013; Active      metoprolol tartrate 50 MG  tablet  Commonly known as: Lopressor   50 mg, Oral, 2 Times Daily      niacin 500 MG tablet   1 tablet, Oral, Daily      nitroglycerin 0.4 MG SL tablet  Commonly known as: NITROSTAT   0.4 mg, Sublingual      pantoprazole 40 MG EC tablet  Commonly known as: PROTONIX   1 tablet, Oral, Daily      saccharomyces boulardii 250 MG capsule  Commonly known as: Florastor   250 mg, Oral, 2 Times Daily      trospium 20 MG tablet  Commonly known as: SANCTURA   20 mg, Oral, 2 Times Daily      valsartan 160 MG tablet  Commonly known as: DIOVAN   80 mg, Oral, Daily, Take 1/2 tablet daily       vitamin D3 125 MCG (5000 UT) tablet tablet   Oral         Stop These Medications    doxycycline 100 MG capsule  Commonly known as: MONODOX           Diet Instructions     Diet: Regular, Cardiac      Discharge Diet:  Regular  Cardiac            Activity Instructions     Activity as Tolerated           Additional Instructions for the Follow-ups that You Need to Schedule     Call MD for problems / concerns.   As directed      CT Chest Without Contrast   Nov 05, 2021      Release to patient: Immediate           Follow-up Information     Marcelle Fields MD .    Specialty: Internal Medicine  Contact information:  [address] [address] [phone]                  Pending Labs     Order Current Status    Blood Culture - Blood, Arm, Left Preliminary result    Blood Culture - Blood, Arm, Left Preliminary result         Ryan Sheppard MD  09/06/21  12:49 EDT    Discharge time spent greater than 30 minutes.

## 2021-09-06 NOTE — THERAPY TREATMENT NOTE
Patient Name: Scar Pérez  : 1934    MRN: [id number]                              Today's Date: 2021       Admit Date: 9/3/2021    Visit Dx:     ICD-10-CM ICD-9-CM   1. Community acquired pneumonia, unspecified laterality  J18.9 486     Patient Active Problem List   Diagnosis   • Absolute anemia   • A-fib (CMS/HCC)   • Arteriosclerosis of coronary artery   • Bleeding gastrointestinal   • Lipoma of skin and subcutaneous tissue   • Obstructive apnea   • Hyperkalemia   • Benign essential hypertension   • Pure hypercholesterolemia   • Impaired fasting glucose   • Right upper lobe pneumonia   • Hypopotassemia     Past Medical History:   Diagnosis Date   • Abdominal aortic aneurysm (CMS/HCC)    • Benign essential hypertension    • Coronary atherosclerosis    • Esophageal reflux    • Hyperglycemia    • Hyperlipidemia     '   • Sick sinus syndrome (CMS/HCC)      Past Surgical History:   Procedure Laterality Date   • CARDIAC PACEMAKER PLACEMENT     • CARDIAC PACEMAKER PLACEMENT     • CORONARY ANGIOPLASTY     • CORONARY ANGIOPLASTY WITH STENT PLACEMENT     • OTHER SURGICAL HISTORY      CATARACT SURGERY   • OTHER SURGICAL HISTORY      ROTATOR CUFF REPAIR      General Information     Row Name 21 1240          Physical Therapy Time and Intention    Document Type  therapy note (daily note)  -     Mode of Treatment  physical therapy;individual therapy  -     Row Name 21 1240          General Information    Patient Profile Reviewed  yes  -     Existing Precautions/Restrictions  fall  -     Row Name 21 1240          Cognition    Orientation Status (Cognition)  oriented x 3  -     Row Name 21 1240          Safety Issues, Functional Mobility    Impairments Affecting Function (Mobility)  shortness of breath;strength;endurance/activity tolerance  -       User Key  (r) = Recorded By, (t) = Taken By, (c) = Cosigned By    Initials Name Provider Type     Isa Goodwin PT Physical  Therapist        Mobility     Row Name 09/06/21 1240          Bed Mobility    Bed Mobility  sit-supine;supine-sit  -     Supine-Sit [place] (Bed Mobility)  verbal cues;nonverbal cues (demo/gesture);1 person assist;standby assist  -     Sit-Supine [place] (Bed Mobility)  verbal cues;1 person assist;standby assist  -     Row Name 09/06/21 1240          Sit-Stand Transfer    Sit-Stand [place] (Transfers)  standby assist;verbal cues;nonverbal cues (demo/gesture);1 person assist  -[facility] Name 09/06/21 1240          Gait/Stairs (Locomotion)    [place] Level (Gait)  contact guard;verbal cues;nonverbal cues (demo/gesture);1 person assist  -     Assistive Device (Gait)  walker, front-wheeled  -     Distance in Feet (Gait)  90'  -     Deviations/Abnormal Patterns (Gait)  gait speed decreased;stride length decreased  -     Bilateral Gait Deviations  forward flexed posture  -     Comment (Gait/Stairs)  cueing for forward flexed posture  -       User Key  (r) = Recorded By, (t) = Taken By, (c) = Cosigned By    Initials Name Provider Type     Isa Goodwin, NAYANA Physical Therapist        Obj/Interventions     Row Name 09/06/21 1243          Motor Skills    Therapeutic Exercise  other (see comments) seated LAQ and heel raises x10e  -       User Key  (r) = Recorded By, (t) = Taken By, (c) = Cosigned By    Initials Name Provider Type     Isa Goodwin, NAYANA Physical Therapist        Goals/Plan    No documentation.       Clinical Impression     Row Name 09/06/21 1241          Pain    Additional Documentation  Pain Scale: FACES Pre/Post-Treatment (Group)  -     Row Name 09/06/21 1241          Pain Scale: FACES Pre/Post-Treatment    Pain: FACES Scale, Pretreatment  0-->no hurt  -     Posttreatment Pain Rating  0-->no hurt  -     Row Name 09/06/21 1241          Plan of Care Review    Plan of Care Reviewed With  patient  -     Outcome Summary  Pt. agreeable to PT this date; remains  on room air with SaO2 at 96% prior to session. Pt. ambulated 90' with rwx and CGA, continued to cue pt. to improve upright posture. Upon return to room pt. SaO2 94%, tolerated LAQ and seated heel raises EOB prior to return to supine. Pt. hopeful to D/C home today, will continue to monitor.  -[facility] Name 09/06/21 1241          Therapy Assessment/Plan (PT)    Rehab Potential (PT)  good, to achieve stated therapy goals  -     Criteria for Skilled Interventions Met (PT)  yes  -[facility] Name 09/06/21 1241          Vital Signs    Pre SpO2 (%)  96  -     O2 Delivery Pre Treatment  room air  -     Post SpO2 (%)  94  -     O2 Delivery Post Treatment  room air  -[facility] Name 09/06/21 1241          Positioning and Restraints    Pre-Treatment Position  in bed  -     Post Treatment Position  bed  -MH     In Bed  supine;notified nsg;call light within reach;encouraged to call for assist;exit alarm on  -       User Key  (r) = Recorded By, (t) = Taken By, (c) = Cosigned By    Initials Name Provider Type    Isa Bernard, NAYANA Physical Therapist        Outcome Measures     Row Name 09/06/21 1243          How much help from another person do you currently need...    Turning from your back to your side while in flat bed without using bedrails?  4  -MH     Moving from lying on back to sitting on the side of a flat bed without bedrails?  4  -MH     Moving to and from a bed to a chair (including a wheelchair)?  3  -MH     Standing up from a chair using your arms (e.g., wheelchair, bedside chair)?  3  -MH     Climbing 3-5 steps with a railing?  3  -MH     To walk in hospital room?  3  -MH     AM-PAC 6 Clicks Score (PT)  20  -[facility] Name 09/06/21 1243          Functional Assessment    Outcome Measure Options  AM-PAC 6 Clicks Basic Mobility (PT)  -       User Key  (r) = Recorded By, (t) = Taken By, (c) = Cosigned By    Initials Name Provider Type    Isa Bernard, NAYANA Physical Therapist                        Physical Therapy Education                 Title: PT OT SLP Therapies (Done)     Topic: Physical Therapy (Done)     Point: Mobility training (Done)     Learning Progress Summary           Patient Acceptance, E,TB,D, VU,DU,NR by  at 9/6/2021 1244    Acceptance, TB,E,D, VU,DU,NR by  at 9/5/2021 1505                   Point: Body mechanics (Done)     Learning Progress Summary           Patient Acceptance, E,TB,D, VU,DU,NR by  at 9/6/2021 1244    Acceptance, TB,E,D, VU,DU,NR by  at 9/5/2021 1505                   Point: Precautions (Done)     Learning Progress Summary           Patient Acceptance, TB,E,D, VU,DU,NR by  at 9/5/2021 1505                               User Key     Initials Effective Dates Name Provider Type Discipline     05/26/20 -  Isa Goodwin, PT Physical Therapist PT              PT Recommendation and Plan  Planned Therapy Interventions (PT): balance training, bed mobility training, gait training, home exercise program, stretching, strengthening, stair training, postural re-education, patient/family education, transfer training, neuromuscular re-education  Plan of Care Reviewed With: patient  Outcome Summary: Pt. agreeable to PT this date; remains on room air with SaO2 at 96% prior to session. Pt. ambulated 90' with rwx and CGA, continued to cue pt. to improve upright posture. Upon return to room pt. SaO2 94%, tolerated LAQ and seated heel raises EOB prior to return to supine. Pt. hopeful to D/C home today, will continue to monitor.     Time Calculation:   PT Charges     Row Name 09/06/21 1244             Time Calculation    Start Time  1037  -      Stop Time  1047  -      Time Calculation (min)  10 min  -      PT Received On  09/06/21  -      PT - Next Appointment  09/07/21  -         Time Calculation- PT    Total Timed Code Minutes- PT  10 minute(s)  -         Timed Charges    04704 - PT Therapeutic Activity Minutes  10  -         Total Minutes    Timed Charges Total  Minutes  10  -MH       Total Minutes  10  -MH        User Key  (r) = Recorded By, (t) = Taken By, (c) = Cosigned By    Initials Name Provider Type     Isa Goodwin, PT Physical Therapist        Therapy Charges for Today     Code Description Service Date Service Provider Modifiers Qty    [id number]  PT THERAPEUTIC ACT EA 15 MIN 9/5/2021 Isa Goodwin, PT GP 1    [id number]  PT EVAL MOD COMPLEXITY 1 9/5/2021 Isa Goodwin, PT GP 1    [id number]  PT THERAPEUTIC ACT EA 15 MIN 9/6/2021 Isa Goodwin, PT GP 1          PT G-Codes  Outcome Measure Options: AM-PAC 6 Clicks Basic Mobility (PT)  AM-PAC 6 Clicks Score (PT): 20    Isa Goodwin PT  9/6/2021

## 2021-09-06 NOTE — PLAN OF CARE
Goal Outcome Evaluation:  Plan of Care Reviewed With: patient           Outcome Summary: Pt. agreeable to PT this date; remains on room air with SaO2 at 96% prior to session. Pt. ambulated 90' with rwx and CGA, continued to cue pt. to improve upright posture. Upon return to room pt. SaO2 94%, tolerated LAQ and seated heel raises EOB prior to return to supine. Pt. hopeful to D/C home today, will continue to monitor.    Patient was wearing a face mask during this therapy encounter. Therapist used appropriate personal protective equipment including eye protection, mask, and gloves.  Mask used was standard procedure mask. Appropriate PPE was worn during the entire therapy session. Hand hygiene was completed before and after therapy session. Patient is not in enhanced droplet precautions.

## 2021-09-06 NOTE — MBS/VFSS/FEES
Acute Care - Speech Language Pathology   Swallow Initial Evaluation [facility]     Patient Name: Scar Pérez  : 1934  MRN: [id number]  Today's Date: 2021               Admit Date: 9/3/2021    Visit Dx:     ICD-10-CM ICD-9-CM   1. Community acquired pneumonia, unspecified laterality  J18.9 486     Patient Active Problem List   Diagnosis   • Absolute anemia   • A-fib (CMS/HCC)   • Arteriosclerosis of coronary artery   • Bleeding gastrointestinal   • Lipoma of skin and subcutaneous tissue   • Obstructive apnea   • Hyperkalemia   • Benign essential hypertension   • Pure hypercholesterolemia   • Impaired fasting glucose   • Right upper lobe pneumonia   • Hypopotassemia     Past Medical History:   Diagnosis Date   • Abdominal aortic aneurysm (CMS/HCC)    • Benign essential hypertension    • Coronary atherosclerosis    • Esophageal reflux    • Hyperglycemia    • Hyperlipidemia     '   • Sick sinus syndrome (CMS/HCC)      Past Surgical History:   Procedure Laterality Date   • CARDIAC PACEMAKER PLACEMENT     • CARDIAC PACEMAKER PLACEMENT     • CORONARY ANGIOPLASTY     • CORONARY ANGIOPLASTY WITH STENT PLACEMENT     • OTHER SURGICAL HISTORY      CATARACT SURGERY   • OTHER SURGICAL HISTORY      ROTATOR CUFF REPAIR        SLP Recommendation and Plan  SLP Swallowing Diagnosis: mild, oral dysphagia, esophageal dysphagia  SLP Diet Recommendation: regular textures, thin liquids (avoid dry/sticky; reflux precautions)  Recommended Precautions and Strategies: upright posture during/after eating, alternate between small bites of food and sips of liquid, reflux precautions  SLP Rec. for Method of Medication Administration: meds crushed, with thin liquids, as tolerated     Monitor for Signs of Aspiration: yes, notify SLP if any concerns     Swallow Criteria for Skilled Therapeutic Interventions Met: demonstrates skilled criteria  Anticipated Discharge Disposition (SLP): home  Rehab Potential/Prognosis, Swallowing:  good, to achieve stated therapy goals  Therapy Frequency (Swallow): PRN  Predicted Duration Therapy Intervention (Days): until discharge  Demonstrates Need for Referral to Another Service: gastroenterology  Daily Summary of Progress (SLP): progress toward functional goals is good                   Plan of Care Reviewed With: patient  Progress: no change  Outcome Summary: Pt completed VFSS w/mild oropharyngeal dysphagia with esophageal component. No aspiration witnessed however pt at risk with esophageal residue. Rec reg diet, thin liquids, avoid dry/sticky foods, alternate liquids/solids, reflux precautions, meds crushed with thin liquid as tolerated. Pt educated and reminded to follow up with GI. St to follow for diet tolerance.         SWALLOW EVALUATION (last 72 hours)      SLP Adult Swallow Evaluation     Row Name 09/06/21 1028 09/04/21 1426                Rehab Evaluation    Document Type  evaluation  -JT  evaluation  -JT       Subjective Information  no complaints  -JT  no complaints  -JT       Patient Observations  alert;cooperative;agree to therapy  -JT  alert;cooperative;agree to therapy  -JT       Patient/Family/Caregiver Comments/Observations  pt upright in bed  -JT  pt upright in bed w/family present.  -JT       Patient Effort  good  -JT  good  -JT       Comment  --  pt [place]  -JT       Symptoms Noted During/After Treatment  none  -JT  none  -JT          General Information    Patient Profile Reviewed  --  yes  -JT       Pertinent History Of Current Problem  --  Pt adm w/fever, fatigue. CT chest showing RUL Pna, resp failure. PMH CAD, GERD, Afib, Hiatal Hernia, HX of Esophageal dilation years ago.  -JT       Current Method of Nutrition  --  regular textures;thin liquids  -JT       Precautions/Limitations, Hearing  --  hearing impairment, bilaterally  -JT       Prior Level of Function-Communication  --  WFL  -JT       Prior Level of Function-Swallowing  --  esophageal concerns pt states difficulty  swallowing some foods  -JT       Plans/Goals Discussed with  --  patient and family;agreed upon  -JT       Barriers to Rehab  --  hearing deficit  -JT       Patient's Goals for Discharge  --  patient did not state  -JT       Family Goals for Discharge  --  family did not state  -JT          Pain    Additional Documentation  --  Pain Scale: Numbers Pre/Post-Treatment (Group)  -JT          Pain Scale: Numbers Pre/Post-Treatment    Pretreatment Pain Rating  0/10 - no pain  -JT  0/10 - no pain  -JT       Posttreatment Pain Rating  0/10 - no pain  -JT  0/10 - no pain  -JT          Oral Motor Structure and Function    Dentition Assessment  --  natural, present and adequate  -JT       Secretion Management  --  WNL/WFL  -JT       Mucosal Quality  --  moist, healthy  -JT       Volitional Swallow  --  WFL  -JT       Volitional Cough  --  weak  -JT          Oral Musculature and Cranial Nerve Assessment    Oral Motor General Assessment  --  generalized oral motor weakness  -JT       Lingual Impairment, Detail. Cranial Nerves IX, XII (Glossopharyngeal and Hypoglossal)  --  reduced lingual ROM  -JT       Vocal Impairment, Detail. Cranial Nerve X (Vagus)  --  impaired throat clear/cough (see comments) weak cough  -JT          General Eating/Swallowing Observations    Respiratory Support Currently in Use  --  room air  -JT       Eating/Swallowing Skills  --  fed by SLP  -JT       Positioning During Eating  --  upright 90 degree;upright in bed  -JT       Utensils Used  --  spoon;cup;straw  -JT       Consistencies Trialed  --  regular textures;soft textures;chopped;thin liquids;pureed  -JT          Clinical Swallow Eval    Oral Prep Phase  --  impaired  -JT       Oral Transit  --  WFL  -JT       Oral Residue  --  WFL  -JT       Pharyngeal Phase  --  no overt signs/symptoms of pharyngeal impairment  -JT       Esophageal Phase  --  suspected esophageal impairment  -JT       Clinical Swallow Evaluation Summary  --  Pt  seen for bedside  swallow eval w/mild oral and suspected esophageal dysphagia. Pt w/hx of hiatal hernia and esophageal dilation c/o certain foods sticking in throat. Laryngeal elevation adequate upon palpation. Mildly prolonged mastication noted with regular solids. No c/o food sticking during evaluation. Pt states he has trouble with steak and some other tough foods. No overt signs/symptoms aspiration noted in evaluation, but pt high risk due to esophageal difficulties. Rec regular diet chopped into small pieces, avoid dry/sticky foods, thin liquids, meds crushed or one at a time whole w/thin liquids as tolerated.  -JT          Oral Prep Concerns    Oral Prep Concerns  --  prolonged mastication;inefficient mastication  -JT       Prolonged Mastication  --  regular consistencies  -JT       Inefficient Mastication  --  regular consistencies  -JT          Esophageal Phase Concerns    Esophageal Phase Concerns  --  belching c/o foods sticking, not observed this date  -JT       Belching  --  all consistencies  -JT       Esophageal Phase Concerns, Comment  --  History of hiatal hernia and esophageal dilation  -JT          MBS/VFSS    Utensils Used  spoon;cup;straw  -JT  --       Consistencies Trialed  regular textures;soft textures;chopped;mixed consistency;pureed;thin liquids;barium pill  -JT  --          MBS/VFSS Interpretation    Oral Prep Phase  impaired oral phase of swallowing  -JT  --       Oral Transit Phase  impaired  -JT  --       Oral Residue  impaired  -JT  --       VFSS Summary  Pt presents w/mild Oropharyngeal Dysphagia with esophageal Component, characterized by inconsistent silent transient shallow pen of thin liquids via straw.  Pt w/mildly weak/prolonged mastication of soft and regular solids, decreased base of tongue function, and mild oral residue.  Hyolaryngeal excursion functional but decreased pharyngeal closure and propulsion noted. Pharyngeal residue trace with thin and puree, but mod with regular requiring liquid  wash. Reduced UES opening noted w/regular and barium pill, requiring liquid wash to clear residue. Esophageal scan shows decreased motility with retrograde movement with thin and puree, requiring liquid wash to clear.  Pt w/hx of esophageal stricture. Limited upper esophageal view due to shoulder. Feel pt would benefit from GI consult for esophageal function.  No aspiration witnessed, however pt at risk from material in esophagus.   -JT  --          Oral Preparatory Phase    Oral Preparatory Phase  prolonged manipulation  -JT  --       Prolonged Manipulation  regular textures  -JT  --       Oral Preparatory Phase, Comment  mild  -JT  --          Oral Transit Phase    Impaired Oral Transit Phase  piecemeal oral transit;premature spillage of liquids into pharynx  -JT  --       Piecemeal Oral Transit  regular textures;mixed consistency;mechanical soft;pudding/puree  -JT  --       Premature Spillage of Liquids into Pharynx  thin liquids;pudding/puree;mixed consistency;secondary to reduced lingual control  -JT  --       Oral Transit Phase, Comment  mild  -JT  --          Oral Residue    Impaired Oral Residue  floor of mouth residue  -JT  --       Floor of Mouth Residue  mechanical soft;regular textures;pudding/puree  -JT  --       Response to Oral Residue  cleared residue;with spontaneous subsequent swallow  -JT  --       Oral Residue, Comment  mild  -JT  --          Initiation of Pharyngeal Swallow    Initiation of Pharyngeal Swallow  bolus in valleculae  -JT  --       Pharyngeal Phase  impaired pharyngeal phase of swallowing mild  -JT  --       Penetration During the Swallow  thin liquids liq wash w/straw; inconsistent; transient shallow  -JT  --       Response to Penetration  shallow;transient;no response  -JT  --       Rosenbek's Scale  thin:;2--->level 2  -JT  --       Pharyngeal Residue  valleculae;thin liquids;pudding/puree;regular textures;base of tongue;posterior pharyngeal wall  -JT  --       Response to  Residue  cleared residue with spontaneous subsequent swallow;cleared residue with liquid wash  -JT  --       Attempted Compensatory Maneuvers  alternative liquids/solids  -JT  --          Esophageal Phase    Esophageal Phase  esophageal retention with retrograde flow below PES;see radiology report for further details;esophageal retention;irregular barium column w/regular, puree, and barium pill  -JT  --       Esophageal Phase, Comment  hz of stricture w/dilation  -JT  --          SLP Communication to Radiology    Severity Level of Dysphagia  mild dysphagia;oral dysfunction;pharyngeal dysfunction;suspected esophageal dysfunction  -JT  --       Consistencies Aspirated/Penetrated  penetrated;thin liquids  -JT  --       Summary Statement Continued  Pt presents w/mild Oropharyngeal Dysphagia with esophageal Component, characterized by inconsistent silent transient shallow pen of thin liquids via straw.  Pt w/mildly weak/prolonged mastication of soft and regular solids, decreased base of tongue function, and mild oral residue.  Hyolaryngeal excursion functional but decreased pharyngeal closure and propulsion noted. Pharyngeal residue trace with thin and puree, but mod with regular requiring liquid wash. Reduced UES opening noted w/regular and barium pill, requiring liquid wash to clear residue. Esophageal scan shows decreased motility with retrograde movement with thin and puree, requiring liquid wash to clear.  Pt w/hx of esophageal stricture. Limited upper esophageal view due to shoulder. Feel pt would benefit from GI consult for esophageal function.  No aspiration witnessed, however pt at risk from material in esophagus.   -JT  --          Clinical Impression    Daily Summary of Progress (SLP)  progress toward functional goals is good  -JT  progress toward functional goals as expected  -JT       SLP Swallowing Diagnosis  mild;oral dysphagia;esophageal dysphagia  -JT  mild;oral dysphagia;R/O pharyngeal  dysphagia;esophageal dysphagia  -JT       Functional Impact  risk of aspiration/pneumonia;risk of malnutrition;risk of dehydration  -JT  risk of aspiration/pneumonia;risk of malnutrition;risk of dehydration  -JT       Rehab Potential/Prognosis, Swallowing  good, to achieve stated therapy goals  -JT  good, to achieve stated therapy goals  -JT       Swallow Criteria for Skilled Therapeutic Interventions Met  demonstrates skilled criteria  -JT  demonstrates skilled criteria  -JT          Recommendations    Therapy Frequency (Swallow)  PRN  -JT  PRN  -JT       Predicted Duration Therapy Intervention (Days)  until discharge  -JT  until discharge  -JT       SLP Diet Recommendation  regular textures;thin liquids avoid dry/sticky; reflux precautions  -JT  regular textures;thin liquids avoid dry/sticky foods  -JT       Recommended Diagnostics  --  VFSS (MBS)  -JT       Recommended Precautions and Strategies  upright posture during/after eating;alternate between small bites of food and sips of liquid;reflux precautions  -JT  upright posture during/after eating;small bites of food and sips of liquid;alternate between small bites of food and sips of liquid  -JT       Oral Care Recommendations  Oral Care BID/PRN  -JT  Oral Care BID/PRN  -JT       SLP Rec. for Method of Medication Administration  meds crushed;with thin liquids;as tolerated  -JT  meds whole;meds crushed;with thin liquids;as tolerated  -JT       Monitor for Signs of Aspiration  yes;notify SLP if any concerns  -JT  yes;notify SLP if any concerns  -JT       Anticipated Discharge Disposition (SLP)  home  -JT  unknown  -JT       Demonstrates Need for Referral to Another Service  gastroenterology  -JT  gastroenterology  -JT          Swallow Goals (SLP)    Oral Nutrition/Hydration Goal Selection (SLP)  --  oral nutrition/hydration, SLP goal 1  -JT          Oral Nutrition/Hydration Goal 1 (SLP)    Oral Nutrition/Hydration Goal 1, SLP  --  Pt will tolerate least  restrictive diet w/no s/s aspiration.   -JT       Time Frame (Oral Nutrition/Hydration Goal 1, SLP)  --  by discharge  -JT       Progress/Outcomes (Oral Nutrition/Hydration Goal 1, SLP)  good progress toward goal;other (see comments) pt w/ minimal oral dysphagia and suspected esophageal   -JT  --         User Key  (r) = Recorded By, (t) = Taken By, (c) = Cosigned By    Initials Name Effective Dates    Aura Smith 06/16/21 -           EDUCATION  The patient has been educated in the following areas:   Dysphagia (Swallowing Impairment).   Patient was not wearing a face mask during this therapy encounter. Therapist used appropriate personal protective equipment including mask, eye protection and gloves.  Mask used was standard procedure mask. Appropriate PPE was worn during the entire therapy session. Hand hygiene was completed before and after therapy session. Patient is not in enhanced droplet precautions.                   SLP GOALS     Row Name 09/06/21 1028 09/04/21 1426          Oral Nutrition/Hydration Goal 1 (SLP)    Oral Nutrition/Hydration Goal 1, SLP  --  Pt will tolerate least restrictive diet w/no s/s aspiration.   -JT     Time Frame (Oral Nutrition/Hydration Goal 1, SLP)  --  by discharge  -JT     Progress/Outcomes (Oral Nutrition/Hydration Goal 1, SLP)  good progress toward goal;other (see comments) pt w/ minimal oral dysphagia and suspected esophageal   -JT  --       User Key  (r) = Recorded By, (t) = Taken By, (c) = Cosigned By    Initials Name Provider Type    Aura Smith Speech and Language Pathologist           SLP Outcome Measures (last 72 hours)      SLP Outcome Measures     Row Name 09/06/21 1100 09/04/21 1400          SLP Outcome Measures    Outcome Measure Used?  Adult NOMS  -JT  Adult NOMS  -JT        Adult FCM Scores    FCM Chosen  Swallowing  -JT  Swallowing  -JT     Swallowing FCM Score  6  -JT  7  -JT       User Key  (r) = Recorded By, (t) = Taken By, (c) =  Cosigned By    Initials Name Effective Dates    Aura Smith 06/16/21 -            Time Calculation:   Time Calculation- SLP     Row Name 09/06/21 1106             Time Calculation- SLP    SLP Start Time  0930  -JT      SLP Stop Time  1115  -JT      SLP Time Calculation (min)  105 min  -JT      SLP Received On  09/06/21  -JT         Untimed Charges    97477-ZM Motion Fluoro Eval Swallow Minutes  105  -JT         Total Minutes    Untimed Charges Total Minutes  105  -JT       Total Minutes  105  -JT        User Key  (r) = Recorded By, (t) = Taken By, (c) = Cosigned By    Initials Name Provider Type    Aura Smith Speech and Language Pathologist          Therapy Charges for Today     Code Description Service Date Service Provider Modifiers Qty    [id number] HC ST MOTION FLUORO EVAL SWALLOW 7 9/6/2021 Aura Stone GN 1               Aura Stone  9/6/2021

## 2021-09-06 NOTE — PROGRESS NOTES
"[facility]         Dr Cavazos Sayied   LOS: 3 days   Patient Care Team:  Marcelle Fields MD as PCP - General (Internal Medicine)  Sami Fay MD as Consulting Physician (Orthopedic Surgery)  Jeannie Palomo MD (Dermatology)  Yomi Delgadillo DPM as Consulting Physician (Podiatry)  Devon Florez MD (Vascular Surgery)  Dorian Connor MD as Consulting Physician (Cardiology)    Chief Complaint: Acute hypoxemic respiratory failure with right upper lobe pneumonia dysphagia    Interval History: Resting comfortably on room air.  No overnight issues reported    REVIEW OF SYSTEMS:   CARDIOVASCULAR: No chest pain, chest pressure or chest discomfort. No palpitations or edema.   RESPIRATORY: Short of breath with activity  GASTROINTESTINAL: No anorexia, nausea, vomiting or diarrhea. No abdominal pain or blood.   HEMATOLOGIC: No bleeding or bruising.     Ventilator/Non-Invasive Ventilation Settings (From admission, onward)    None            Vital Signs  Temp:  [97.5 °F (36.4 °C)-98.1 °F (36.7 °C)] 97.7 °F (36.5 °C)  Heart Rate:  [62-82] 62  Resp:  [18-20] 20  BP: (114-133)/(63-73) 120/63    Intake/Output Summary (Last 24 hours) at 9/6/2021 1435  Last data filed at 9/5/2021 2357  Gross per 24 hour   Intake --   Output 500 ml   Net -500 ml     Flowsheet Rows      First Filed Value   Admission Height  180.3 cm (71\") Documented at 09/03/2021 1608   Admission Weight  108 kg (238 lb) Documented at 09/03/2021 1608          Physical Exam:  Patient is examined using the personal protective equipment as per guidelines from infection control for this particular patient as enacted.  Hand hygiene was performed before and after patient interaction.   General Appearance:    Alert, cooperative, in no acute distress.  Following simple commands  Neck midline trachea, no thyromegaly   Lungs:    Diminished breath sounds with crackles on the basis    Heart:    Regular rhythm and normal " rate, normal S1 and S2, no            murmur, no gallop, no rub, no click   Chest Wall:    No abnormalities observed   Abdomen:     Normal bowel sounds, no masses, no organomegaly, soft        nontender, nondistended, no guarding, no rebound                tenderness   Extremities:   Moves all extremities well, no edema, no cyanosis, no             redness  CNS no focal neurological deficits normal sensory exam  Skin no rashes no nodules  Musculoskeletal no cyanosis no clubbing normal range of motion     Results Review:        Results from last 7 days   Lab Units 09/04/21  0402 09/03/21  1306 09/03/21  1306 08/31/21  1545   SODIUM mmol/L 131*  --  129* 124*   POTASSIUM mmol/L 3.3*  --  3.5 4.0   CHLORIDE mmol/L 95*  --  91* 87*   TOTAL CO2 mmol/L  --   --   --  24   CO2 mmol/L 25.1  --  26.6  --    BUN mg/dL 14  --  22 22   CREATININE mg/dL 0.68*  --  0.85 1.16   GLUCOSE mg/dL 94   < > 109*  --    CALCIUM mg/dL 8.2*  --  9.0 8.2*    < > = values in this interval not displayed.         Results from last 7 days   Lab Units 09/04/21  0402 09/03/21  1306 08/31/21  1552   WBC 10*3/mm3 5.99 7.21  --    HEMOGLOBIN g/dL 12.6* 13.8  --    HEMOGLOBIN, POC g/dL  --   --  13.4   HEMATOCRIT % 37.1* 40.3  --    HEMATOCRIT POC %  --   --  38.4   PLATELETS 10*3/mm3 157 151  --                            I reviewed the patient's new clinical results.  I personally viewed and interpreted the patient's chest x-ray.        Medication Review:   ampicillin-sulbactam, 3 g, Intravenous, Q6H  aspirin, 325 mg, Oral, Daily  atorvastatin, 20 mg, Oral, Daily  vitamin D3, 5,000 Units, Oral, Daily  hydroCHLOROthiazide, 12.5 mg, Oral, Daily  metoprolol tartrate, 50 mg, Oral, BID  oxybutynin XL, 10 mg, Oral, Daily  pantoprazole, 40 mg, Oral, Daily  saccharomyces boulardii, 250 mg, Oral, BID  vitamin B-12, 1,000 mcg, Oral, Daily             ASSESSMENT:   Acute hypoxic respiratory failure  Right upper lobe pneumonia  Dysphagia  Atrial fibrillation  not on anticoagulation   AAA s/p previous EVAR  SSS s/p PPM  CAD  J CARLOS    PLAN:  Right upper lobe consolidation/atelectasis.  Improving with current antibiotics.  Would recommend follow-up CT chest in 6 to 8 weeks to ensure complete resolution  Oxygen to keep sats above 90% wean as tolerated  Swallow evaluation per speech  Home CPAP to be set up at bedside.  Discussed with wife  Mobilize and ambulate.  No objections to discharge      Dirk Jacques MD  09/06/21  14:35 EDT

## 2021-09-07 ENCOUNTER — TRANSITIONAL CARE MANAGEMENT TELEPHONE ENCOUNTER (OUTPATIENT)
Dept: CALL CENTER | Facility: HOSPITAL | Age: 86
End: 2021-09-07

## 2021-09-07 NOTE — OUTREACH NOTE
Call Center TCM Note      Responses   [facility] patient discharged from?  [place]   Does the patient have one of the following disease processes/diagnoses(primary or secondary)?  COPD/Pneumonia   Was the primary reason for admission:  Pneumonia   TCM attempt successful?  Yes   Call start time  0949   Call end time  0954   Discharge diagnosis  PNA, A-fib,    Person spoke with today (if not patient) and relationship  patient   Meds reviewed with patient/caregiver?  Yes   Is the patient having any side effects they believe may be caused by any medication additions or changes?  No   Does the patient have all medications ordered at discharge?  N/A   Is the patient taking all medications as directed (includes completed medication regime)?  Yes   Does the patient have a primary care provider?   Yes   Does the patient have an appointment with their PCP or specialist within 7 days of discharge?  No   What is preventing the patient from scheduling follow up appointments within 7 days of discharge?  Earlier appointment not available   Nursing Interventions  Educated patient on importance of making appointment, Advised patient to make appointment   Has the patient kept scheduled appointments due by today?  N/A   Pulse Ox monitoring  None   Psychosocial issues?  No   Did the patient receive a copy of their discharge instructions?  Yes   Nursing interventions  Reviewed instructions with patient   What is the patient's perception of their health status since discharge?  Improving   Nursing Interventions  Nurse provided patient education   Are the patient's immunizations up to date?   Yes   Nursing interventions  Educated on importance of maintaining up to date immunizations as advised by provider   If the patient is a current smoker, are they able to teach back resources for cessation?  Not a smoker   Is the patient/caregiver able to teach back the hierarchy of who to call/visit for symptoms/problems? PCP, Specialist,  Home health nurse, Urgent Care, ED, 911  Yes   Is the patient/caregiver able to teach back signs and symptoms of worsening condition:  Fever/chills, Shortness of breath, Chest pain   Is the patient/caregiver able to teach back importance of completing antibiotic course of treatment?  Yes   TCM call completed?  Yes   Wrap up additional comments  Pt states he is doing better. No questions/concerns.          Ashley Hernandez RN    9/7/2021, 09:54 EDT

## 2021-09-08 LAB
BACTERIA SPEC AEROBE CULT: NORMAL
BACTERIA SPEC AEROBE CULT: NORMAL

## 2021-09-14 ENCOUNTER — OFFICE VISIT (OUTPATIENT)
Dept: INTERNAL MEDICINE | Facility: CLINIC | Age: 86
End: 2021-09-14

## 2021-09-14 VITALS
TEMPERATURE: 97.6 F | DIASTOLIC BLOOD PRESSURE: 82 MMHG | HEART RATE: 72 BPM | SYSTOLIC BLOOD PRESSURE: 136 MMHG | BODY MASS INDEX: 32.62 KG/M2 | HEIGHT: 71 IN | WEIGHT: 233 LBS

## 2021-09-14 DIAGNOSIS — I10 BENIGN ESSENTIAL HYPERTENSION: Primary | ICD-10-CM

## 2021-09-14 DIAGNOSIS — R13.10 DYSPHAGIA, UNSPECIFIED TYPE: ICD-10-CM

## 2021-09-14 DIAGNOSIS — E87.6 HYPOKALEMIA: ICD-10-CM

## 2021-09-14 DIAGNOSIS — K21.9 GASTROESOPHAGEAL REFLUX DISEASE, UNSPECIFIED WHETHER ESOPHAGITIS PRESENT: ICD-10-CM

## 2021-09-14 DIAGNOSIS — R93.89 ABNORMAL CT SCAN, CHEST: ICD-10-CM

## 2021-09-14 DIAGNOSIS — J69.0 ASPIRATION PNEUMONIA OF RIGHT UPPER LOBE, UNSPECIFIED ASPIRATION PNEUMONIA TYPE (HCC): ICD-10-CM

## 2021-09-14 LAB
BUN SERPL-MCNC: 11 MG/DL (ref 8–23)
BUN/CREAT SERPL: 15.1 (ref 7–25)
CALCIUM SERPL-MCNC: 9 MG/DL (ref 8.6–10.5)
CHLORIDE SERPL-SCNC: 97 MMOL/L (ref 98–107)
CO2 SERPL-SCNC: 28.6 MMOL/L (ref 22–29)
CREAT SERPL-MCNC: 0.73 MG/DL (ref 0.76–1.27)
GLUCOSE SERPL-MCNC: 94 MG/DL (ref 65–99)
POTASSIUM SERPL-SCNC: 4.5 MMOL/L (ref 3.5–5.2)
SODIUM SERPL-SCNC: 135 MMOL/L (ref 136–145)

## 2021-09-14 PROCEDURE — 99214 OFFICE O/P EST MOD 30 MIN: CPT | Performed by: INTERNAL MEDICINE

## 2021-09-14 RX ORDER — VALSARTAN 80 MG/1
80 TABLET ORAL DAILY
Qty: 30 TABLET | Refills: 3 | Status: SHIPPED | OUTPATIENT
Start: 2021-09-14

## 2021-09-14 NOTE — PROGRESS NOTES
"Chief Complaint   Patient presents with   • Pneumonia     follow up   • reflux   • Hypertension     with recent hypotension       History of Present Illness   Scar Pérez is a 87 y.o. male presents for hospital follow up evaluation. Had pneumonia, hypokalemia, and hypotension. Due to low bp he reduced valsartan to 80 mg, metoprolol down from 100 bid to 50 bid and hctz reduced from 25 to 12.5 mg. Reports bp at home predominantly 120s but did feel poorly yesterday afternoon and sbp was 95.   He has completed antibiotics. Breathing is much improved.   He was thought to possibly have aspiration type pneumonia. He notes that occasionally his food can \"get stuck\". Most recently this was a piece of toast. \"sometimes it just gets caught in there some way\". Most recent egd 2014. Mild dysphagia on video swallow study.         The following portions of the patient's history were reviewed and updated as appropriate: allergies, current medications, past family history, past medical history, past social history, past surgical history and problem list.  Current Outpatient Medications on File Prior to Visit   Medication Sig Dispense Refill   • aspirin 325 MG tablet Take 1 tablet by mouth daily.     • atorvastatin (LIPITOR) 20 MG tablet Take 20 mg by mouth daily.     • Cholecalciferol (VITAMIN D3) 5000 UNITS tablet Take by mouth.     • Cyanocobalamin (B-12) 1000 MCG capsule Take 1 capsule by mouth.     • hydroCHLOROthiazide (MICROZIDE) 12.5 MG capsule Take 1 capsule by mouth Daily. 90 capsule 2   • hydrocortisone (ANUSOL-HC) 25 MG suppository Hydrocortisone Acetate 25 MG Rectal Suppository; Patient Sig: Hydrocortisone Acetate 25 MG Rectal Suppository INSERT ONE SUPPOSITORY RECTALLY TWICE DAILY AS NEEDED; 12; 4; 19-Nov-2013; Active     • metoprolol tartrate (Lopressor) 50 MG tablet Take 1 tablet by mouth 2 (Two) Times a Day. 60 tablet 4   • niacin 500 MG tablet Take 1 tablet by mouth daily.     • nitroglycerin (NITROSTAT) 0.4 " MG SL tablet Place 0.4 mg under the tongue.     • pantoprazole (PROTONIX) 40 MG EC tablet Take 1 tablet by mouth daily.     • saccharomyces boulardii (Florastor) 250 MG capsule Take 1 capsule by mouth 2 (Two) Times a Day. 20 capsule 0   • trospium (SANCTURA) 20 MG tablet Take 20 mg by mouth 2 (Two) Times a Day.     • [DISCONTINUED] valsartan (DIOVAN) 160 MG tablet Take 80 mg by mouth Daily. Take 1/2 tablet daily       No current facility-administered medications on file prior to visit.     Review of Systems   Constitutional: Negative.         Energy improving   HENT: Negative.    Eyes: Negative.    Respiratory: Negative.         Much improved. No longer having a cough.    Cardiovascular: Negative.    Gastrointestinal:        Reflux   Endocrine: Negative.    Genitourinary: Negative.    Musculoskeletal: Negative.    Skin: Negative.    Neurological: Negative.    Hematological: Negative.    Psychiatric/Behavioral: Negative.        Objective   Physical Exam  Vitals and nursing note reviewed.   Constitutional:       Appearance: Normal appearance.   HENT:      Head: Normocephalic and atraumatic.      Right Ear: Tympanic membrane normal.      Left Ear: Tympanic membrane normal.      Nose: Nose normal.      Mouth/Throat:      Mouth: Mucous membranes are moist.   Eyes:      Extraocular Movements: Extraocular movements intact.      Pupils: Pupils are equal, round, and reactive to light.   Cardiovascular:      Rate and Rhythm: Normal rate and regular rhythm.      Pulses: Normal pulses.   Pulmonary:      Effort: Pulmonary effort is normal.      Breath sounds: Normal breath sounds.   Abdominal:      General: Abdomen is flat. Bowel sounds are normal.      Palpations: Abdomen is soft.   Musculoskeletal:         General: Normal range of motion.      Cervical back: Normal range of motion.   Skin:     General: Skin is warm and dry.   Neurological:      General: No focal deficit present.      Mental Status: He is alert and oriented to  "person, place, and time.   Psychiatric:         Mood and Affect: Mood normal.         Behavior: Behavior normal.         Thought Content: Thought content normal.         Judgment: Judgment normal.          /82   Pulse 72   Temp 97.6 °F (36.4 °C)   Ht 180.3 cm (71\")   Wt 106 kg (233 lb)   BMI 32.50 kg/m²     Assessment/Plan   Diagnoses and all orders for this visit:    Benign essential hypertension    Aspiration pneumonia of right upper lobe, unspecified aspiration pneumonia type (CMS/HCC)    Hypokalemia    Gastroesophageal reflux disease, unspecified whether esophagitis present    Dysphagia, unspecified type    Other orders  -     valsartan (Diovan) 80 MG tablet; Take 1 tablet by mouth Daily.        Given recent hypotension will further reduce valsartan to 40 mg daily. Continue metoprolol at 50 mg bid and hctz 12.5 mg. Will monitor bp at home. He will have ct scan in 6 weeks to document resolution of cap. Will test bmp today to ensure normokalemic. Patient will f/u w/ gi to see if repeat egd is necessary. Follow up in 2 months or prn.          "

## 2021-09-20 ENCOUNTER — OFFICE VISIT (OUTPATIENT)
Dept: GASTROENTEROLOGY | Facility: CLINIC | Age: 86
End: 2021-09-20

## 2021-09-20 ENCOUNTER — PATIENT ROUNDING (BHMG ONLY) (OUTPATIENT)
Dept: GASTROENTEROLOGY | Facility: CLINIC | Age: 86
End: 2021-09-20

## 2021-09-20 VITALS
WEIGHT: 237 LBS | BODY MASS INDEX: 33.18 KG/M2 | RESPIRATION RATE: 16 BRPM | DIASTOLIC BLOOD PRESSURE: 72 MMHG | OXYGEN SATURATION: 98 % | SYSTOLIC BLOOD PRESSURE: 118 MMHG | HEART RATE: 74 BPM | HEIGHT: 71 IN

## 2021-09-20 DIAGNOSIS — R13.19 ESOPHAGEAL DYSPHAGIA: Primary | ICD-10-CM

## 2021-09-20 DIAGNOSIS — K21.9 GASTROESOPHAGEAL REFLUX DISEASE, UNSPECIFIED WHETHER ESOPHAGITIS PRESENT: ICD-10-CM

## 2021-09-20 PROCEDURE — 99204 OFFICE O/P NEW MOD 45 MIN: CPT | Performed by: NURSE PRACTITIONER

## 2021-09-20 NOTE — PROGRESS NOTES
Chief Complaint   Patient presents with   • Esophageal dysphagia       HPI    Scar Pérez is a  87 y.o. male here to establish care as a new patient for complaints of esophageal dysphagia and heartburn.    This patient will follow with Dr. Luis as well.    Patient underwent a video esophagram with speech therapy earlier this month, during hospital admission for community-acquired pneumonia, in which the patient exhibited mild oropharyngeal dysphagia with some inconsistent swallow.  Speech therapy recommended regular textures and thin liquids with reflux precautions.    On visit today Scar tells me he has been dealing with years of swallowing issues.  Mainly with solid foods.  Occurs about once a month.  Avoids doughy breads and dense meat as a result.  In between episodes he feels fine.  On occasion he drinks water for relief or regurgitates.  He is on daily PPI therapy, Protonix and reports adequate control of dyspepsia.  Denies nausea or vomiting.  Appetite is good.  Weight is stable.    No lower GI complaints.    His last EGD was 2014 found to have a prepyloric ulcer. (Dr. Dobson at [facility])   Colonoscopy 2014 with diverticulosis.    Past Medical History:   Diagnosis Date   • Abdominal aortic aneurysm (CMS/HCC)    • Benign essential hypertension    • Coronary atherosclerosis    • Esophageal reflux    • Hyperglycemia    • Hyperlipidemia     '   • Sick sinus syndrome (CMS/HCC)        Past Surgical History:   Procedure Laterality Date   • CARDIAC PACEMAKER PLACEMENT     • CARDIAC PACEMAKER PLACEMENT     • CORONARY ANGIOPLASTY     • CORONARY ANGIOPLASTY WITH STENT PLACEMENT     • OTHER SURGICAL HISTORY      CATARACT SURGERY   • OTHER SURGICAL HISTORY      ROTATOR CUFF REPAIR        Scheduled Meds:     Continuous Infusions: No current facility-administered medications for this visit.      PRN Meds:     Allergies   Allergen Reactions   • Iodine        Social History     Socioeconomic History   • Marital  status:      Spouse name: Not on file   • Number of children: Not on file   • Years of education: Not on file   • Highest education level: Not on file   Tobacco Use   • Smoking status: Never Smoker   • Smokeless tobacco: Never Used   Substance and Sexual Activity   • Alcohol use: Yes     Comment: BEING A SOCIAL DRINKER   • Drug use: No       Family History   Problem Relation Age of Onset   • Leukemia Father    • Heart disease Maternal Aunt    • Diabetes Daughter        Review of Systems   Constitutional: Negative for activity change, appetite change, fatigue and unexpected weight change.   HENT: Positive for trouble swallowing.    Eyes: Negative.    Respiratory: Negative.    Cardiovascular: Negative.    Gastrointestinal: Negative for abdominal distention, abdominal pain, anal bleeding, blood in stool, constipation, diarrhea, nausea, rectal pain and vomiting.   Endocrine: Negative.    Genitourinary: Negative.    Musculoskeletal: Negative.    Allergic/Immunologic: Negative.    Neurological: Negative.    Hematological: Negative.    Psychiatric/Behavioral: Negative.        Vitals:    09/20/21 1408   BP: 118/72   Pulse: 74   Resp: 16   SpO2: 98%       Physical Exam  Constitutional:       Appearance: He is well-developed.   HENT:      Head: Normocephalic.      Nose: Nose normal.   Eyes:      Pupils: Pupils are equal, round, and reactive to light.   Cardiovascular:      Rate and Rhythm: Normal rate and regular rhythm.      Pulses: Normal pulses.      Heart sounds: Normal heart sounds.   Pulmonary:      Effort: Pulmonary effort is normal.   Abdominal:      General: Bowel sounds are normal. There is no distension.      Palpations: Abdomen is soft. There is no mass.      Tenderness: There is no abdominal tenderness. There is no guarding.      Hernia: No hernia is present.   Musculoskeletal:         General: Normal range of motion.      Cervical back: Normal range of motion.   Skin:     General: Skin is warm and dry.       Capillary Refill: Capillary refill takes less than 2 seconds.   Neurological:      Mental Status: He is alert and oriented to person, place, and time.   Psychiatric:         Behavior: Behavior normal.       Assessment    1. Esophageal dysphagia    - FL Esophagram Complete Double-Contrast; Future    2. Gastroesophageal reflux disease, unspecified whether esophagitis present    - FL Esophagram Complete Double-Contrast; Future    Plan    Arrange esophagram for further evaluation of esophageal dysphagia rule out stricture, ring, narrowing.  Depending on imaging results moved to EGD with Dr. Luis to be performed at [facility].  Continue PPI therapy.  Continue with dietary modifications with the avoidance of dense meat and doughy breads.  Further recommendations pending imaging results.         SERGO Wynn  [facility]  [address] [place] [address]  Office: [phone]

## 2021-09-21 ENCOUNTER — PATIENT ROUNDING (BHMG ONLY) (OUTPATIENT)
Dept: GASTROENTEROLOGY | Facility: CLINIC | Age: 86
End: 2021-09-21

## 2021-09-21 NOTE — PROGRESS NOTES
September 21, 2021    Hello, may I speak with Scar Pérez?    My name is Anthony Vo, I am the practice manager with [facility]  [facility] [address] [address] [phone].    Before we get started may I verify your date of birth? 1934    I am calling to officially welcome you to our practice and ask about your recent visit. Is this a good time to talk? yes    Tell me about your visit with us. What things went well?  The visit went great with Theresa Wolf. Gave me some great medical care and suppose to wait on a call for a referral to get an x-ray soon.       We're always looking for ways to make our patients' experiences even better. Do you have recommendations on ways we may improve?  yes; The wait time was a little longer than I expected but I figure she was helping out patients     Overall were you satisfied with your first visit to our practice? yes       I appreciate you taking the time to speak with me today. Is there anything else I can do for you? no    Thank you, and have a great day.

## 2021-10-06 ENCOUNTER — HOSPITAL ENCOUNTER (OUTPATIENT)
Dept: GENERAL RADIOLOGY | Facility: HOSPITAL | Age: 86
Discharge: HOME | End: 2021-10-06
Admitting: NURSE PRACTITIONER

## 2021-10-06 DIAGNOSIS — R13.19 ESOPHAGEAL DYSPHAGIA: ICD-10-CM

## 2021-10-06 DIAGNOSIS — K21.9 GASTROESOPHAGEAL REFLUX DISEASE, UNSPECIFIED WHETHER ESOPHAGITIS PRESENT: ICD-10-CM

## 2021-10-06 PROCEDURE — A9270 NON-COVERED ITEM OR SERVICE: HCPCS | Performed by: RADIOLOGY

## 2021-10-06 PROCEDURE — 63710000001 BARIUM SULFATE 96 % RECONSTITUTED SUSPENSION: Performed by: RADIOLOGY

## 2021-10-06 PROCEDURE — 63710000001 BARIUM SULFATE 98 % RECONSTITUTED SUSPENSION: Performed by: RADIOLOGY

## 2021-10-06 PROCEDURE — 74221 X-RAY XM ESOPHAGUS 2CNTRST: CPT

## 2021-10-06 PROCEDURE — 63710000001 SOD BICARB-CITRIC ACID-SIMETHICONE 2.21-1.53-0.04 G PACK: Performed by: RADIOLOGY

## 2021-10-06 RX ADMIN — ANTACID/ANTIFLATULENT 1 PACKET: 380; 550; 10; 10 GRANULE, EFFERVESCENT ORAL at 09:07

## 2021-10-06 RX ADMIN — BARIUM SULFATE 50 ML: 960 POWDER, FOR SUSPENSION ORAL at 09:11

## 2021-10-06 RX ADMIN — BARIUM SULFATE 135 ML: 980 POWDER, FOR SUSPENSION ORAL at 09:08

## 2021-10-07 NOTE — PROGRESS NOTES
Dr. Luis patient seen for years of esophageal dysphagia.  Recent video swallowing study during hospital stay for community acquired pneumonia with some mild oropharyngeal dysphagia.  I sent him for an esophagram.  Evidence of hiatal hernia and reflux.  Given his age and medical issues should we just try him on twice daily dosing PPI therapy before moving to EGD.  The reason I am asking you is because you would be the one to do the EGD since Dr. Luis will be out. Last EGD 2014.

## 2021-10-07 NOTE — PROGRESS NOTES
Please inform the patient that his esophagram shows a small hiatal hernia and some mild reflux.  Lets try him on Protonix 40 mg p.o. twice daily and see if he feels better.  Schedule 4 week follow-up.

## 2021-10-08 ENCOUNTER — TELEPHONE (OUTPATIENT)
Dept: GASTROENTEROLOGY | Facility: CLINIC | Age: 86
End: 2021-10-08

## 2021-10-08 RX ORDER — PANTOPRAZOLE SODIUM 40 MG/1
40 TABLET, DELAYED RELEASE ORAL 2 TIMES DAILY
Qty: 60 TABLET | Refills: 5 | Status: SHIPPED | OUTPATIENT
Start: 2021-10-08 | End: 2021-11-05 | Stop reason: SDUPTHER

## 2021-10-08 NOTE — TELEPHONE ENCOUNTER
----- Message from SERGO Wynn sent at 10/7/2021  4:00 PM EDT -----  Please inform the patient that his esophagram shows a small hiatal hernia and some mild reflux.  Lets try him on Protonix 40 mg p.o. twice daily and see if he feels better.  Schedule 4 week follow-up.

## 2021-10-08 NOTE — TELEPHONE ENCOUNTER
Called pt and advised of Theresa COTTON's note.  Pt verbalized understanding.    Pantoprazole escribed to pt's [facility] pharmacy, sent to LULA Cardenas to cosign.     Follow up appt made with Theresa COTTON 11/5/21 @9:15am.

## 2021-10-11 ENCOUNTER — TELEPHONE (OUTPATIENT)
Dept: GASTROENTEROLOGY | Facility: CLINIC | Age: 86
End: 2021-10-11

## 2021-10-11 NOTE — TELEPHONE ENCOUNTER
----- Message from Jasmyne Connolly sent at 10/11/2021  1:25 PM EDT -----  Regarding: meds  Contact: [phone]  Pt Upset about meds

## 2021-11-04 ENCOUNTER — HOSPITAL ENCOUNTER (OUTPATIENT)
Dept: CT IMAGING | Facility: HOSPITAL | Age: 86
Discharge: HOME | End: 2021-11-04
Admitting: INTERNAL MEDICINE

## 2021-11-04 DIAGNOSIS — J69.0 ASPIRATION PNEUMONIA OF RIGHT UPPER LOBE, UNSPECIFIED ASPIRATION PNEUMONIA TYPE (HCC): ICD-10-CM

## 2021-11-04 DIAGNOSIS — R93.89 ABNORMAL CT SCAN, CHEST: ICD-10-CM

## 2021-11-04 PROCEDURE — 71250 CT THORAX DX C-: CPT

## 2021-11-05 ENCOUNTER — OFFICE VISIT (OUTPATIENT)
Dept: GASTROENTEROLOGY | Facility: CLINIC | Age: 86
End: 2021-11-05

## 2021-11-05 VITALS — HEIGHT: 71 IN | BODY MASS INDEX: 33.57 KG/M2 | TEMPERATURE: 97.1 F | WEIGHT: 239.8 LBS

## 2021-11-05 DIAGNOSIS — K21.9 GASTROESOPHAGEAL REFLUX DISEASE, UNSPECIFIED WHETHER ESOPHAGITIS PRESENT: Primary | ICD-10-CM

## 2021-11-05 DIAGNOSIS — K44.9 HIATAL HERNIA: ICD-10-CM

## 2021-11-05 DIAGNOSIS — R91.8 PULMONARY NODULES: Primary | ICD-10-CM

## 2021-11-05 PROCEDURE — 99213 OFFICE O/P EST LOW 20 MIN: CPT | Performed by: NURSE PRACTITIONER

## 2021-11-05 RX ORDER — PANTOPRAZOLE SODIUM 40 MG/1
40 TABLET, DELAYED RELEASE ORAL 2 TIMES DAILY
Qty: 180 TABLET | Refills: 3 | Status: SHIPPED | OUTPATIENT
Start: 2021-11-05

## 2021-11-05 NOTE — PROGRESS NOTES
Chief Complaint   Patient presents with   • Difficulty Swallowing       HPI    Scar Pérez is a  87 y.o. male here for a follow up visit for esophageal dysphagia.    This patient follows with myself and Dr. Luis.    Prior VFSS with mild oropharyngeal dysphagia with inconsistent swallow.  Speech therapy recommended regular textures and thin liquids with reflux precautions.    Recent esophagram showed a small sliding hiatal hernia and some mild reflux.  No evidence of stricture or narrowing.  Patient instructed to increase pantoprazole to twice daily dosing.    On visit today he reports feeling much improved.  He has not had further issues with esophageal dysphagia since increasing PPI therapy.  He is following speech therapies recommendations.  He still avoiding the store triggers such as toast.  Appetite is good.  His weight is stable.  No nausea or vomiting reported on visit today.    Denies diarrhea, constipation, rectal bleeding.    No lower GI complaints.     His last EGD was 2014 found to have a prepyloric ulcer. (Dr. Dobson at [facility])   Colonoscopy 2014 with diverticulosis.    Past Medical History:   Diagnosis Date   • Abdominal aortic aneurysm (HCC)    • Benign essential hypertension    • Coronary atherosclerosis    • Esophageal reflux    • Hyperglycemia    • Hyperlipidemia     '   • Sick sinus syndrome (HCC)        Past Surgical History:   Procedure Laterality Date   • CARDIAC PACEMAKER PLACEMENT     • CARDIAC PACEMAKER PLACEMENT     • COLONOSCOPY  11/05/2014    [facility]    • CORONARY ANGIOPLASTY     • CORONARY ANGIOPLASTY WITH STENT PLACEMENT     • OTHER SURGICAL HISTORY      CATARACT SURGERY   • OTHER SURGICAL HISTORY      ROTATOR CUFF REPAIR    • UPPER GASTROINTESTINAL ENDOSCOPY  11/03/2014    [facility]       Scheduled Meds:     Continuous Infusions: No current facility-administered medications for this visit.      PRN Meds:     Allergies   Allergen Reactions   • Iodine        Social History      Socioeconomic History   • Marital status:    Tobacco Use   • Smoking status: Never Smoker   • Smokeless tobacco: Never Used   Substance and Sexual Activity   • Alcohol use: Yes     Comment: BEING A SOCIAL DRINKER   • Drug use: No       Family History   Problem Relation Age of Onset   • Leukemia Father    • Heart disease Maternal Aunt    • Diabetes Daughter        Review of Systems   Constitutional: Negative for activity change, appetite change, fatigue, fever and unexpected weight change.   HENT: Negative for trouble swallowing.    Respiratory: Negative for apnea, cough, choking, chest tightness, shortness of breath and wheezing.    Cardiovascular: Negative for chest pain, palpitations and leg swelling.   Gastrointestinal: Negative for abdominal distention, abdominal pain, anal bleeding, blood in stool, constipation, diarrhea, nausea, rectal pain and vomiting.       Vitals:    11/05/21 0854   Temp: 97.1 °F (36.2 °C)       Physical Exam  Constitutional:       Appearance: He is well-developed.   Abdominal:      General: Bowel sounds are normal. There is no distension.      Palpations: Abdomen is soft. There is no mass.      Tenderness: There is no abdominal tenderness. There is no guarding.      Hernia: No hernia is present.   Skin:     General: Skin is warm and dry.      Capillary Refill: Capillary refill takes less than 2 seconds.   Neurological:      Mental Status: He is alert and oriented to person, place, and time.   Psychiatric:         Behavior: Behavior normal.     Assessment    Diagnoses and all orders for this visit:    1. Gastroesophageal reflux disease, unspecified whether esophagitis present (Primary)    2. Hiatal hernia    Other orders  -     pantoprazole (PROTONIX) 40 MG EC tablet; Take 1 tablet by mouth 2 (Two) Times a Day.  Dispense: 180 tablet; Refill: 3    Plan    Very pleasant 87-year-old male seen today in follow-up for esophageal dysphagia status post esophagram which showed small  hiatal hernia and some mild reflux reporting improvement in esophageal dysphagia in fact resolution with twice daily dosing PPI therapy.  This point recommend he continue his medication regimen.  Follow a strict antireflux diet.  No indication for EGD.  Return to clinic 3 months to see Dr. Luis.         Theresa Wolf APRJOANN  [facility] [place]  [address] [place] [address]  Office: [phone]

## 2021-11-10 ENCOUNTER — HOSPITAL ENCOUNTER (EMERGENCY)
Facility: HOSPITAL | Age: 86
Discharge: HOME | End: 2021-11-11
Attending: EMERGENCY MEDICINE | Admitting: EMERGENCY MEDICINE

## 2021-11-10 ENCOUNTER — APPOINTMENT (OUTPATIENT)
Dept: GENERAL RADIOLOGY | Facility: HOSPITAL | Age: 86
End: 2021-11-10

## 2021-11-10 ENCOUNTER — APPOINTMENT (OUTPATIENT)
Dept: CT IMAGING | Facility: HOSPITAL | Age: 86
End: 2021-11-10

## 2021-11-10 DIAGNOSIS — S63.502A SPRAIN OF LEFT WRIST, INITIAL ENCOUNTER: ICD-10-CM

## 2021-11-10 DIAGNOSIS — S63.501A WRIST SPRAIN, RIGHT, INITIAL ENCOUNTER: ICD-10-CM

## 2021-11-10 DIAGNOSIS — S80.02XA CONTUSION OF LEFT KNEE, INITIAL ENCOUNTER: ICD-10-CM

## 2021-11-10 DIAGNOSIS — S22.32XA CLOSED FRACTURE OF ONE RIB OF LEFT SIDE, INITIAL ENCOUNTER: Primary | ICD-10-CM

## 2021-11-10 PROCEDURE — 99283 EMERGENCY DEPT VISIT LOW MDM: CPT

## 2021-11-10 PROCEDURE — 71250 CT THORAX DX C-: CPT

## 2021-11-10 PROCEDURE — 73110 X-RAY EXAM OF WRIST: CPT

## 2021-11-10 PROCEDURE — 73560 X-RAY EXAM OF KNEE 1 OR 2: CPT

## 2021-11-11 VITALS
DIASTOLIC BLOOD PRESSURE: 95 MMHG | OXYGEN SATURATION: 97 % | BODY MASS INDEX: 31.15 KG/M2 | RESPIRATION RATE: 20 BRPM | HEART RATE: 62 BPM | SYSTOLIC BLOOD PRESSURE: 184 MMHG | HEIGHT: 72 IN | TEMPERATURE: 97.8 F | WEIGHT: 230 LBS

## 2021-11-11 RX ORDER — OXYCODONE HYDROCHLORIDE AND ACETAMINOPHEN 5; 325 MG/1; MG/1
1 TABLET ORAL EVERY 6 HOURS PRN
Qty: 15 TABLET | Refills: 0 | Status: SHIPPED | OUTPATIENT
Start: 2021-11-11 | End: 2022-04-18

## 2021-11-11 RX ORDER — OXYCODONE HYDROCHLORIDE AND ACETAMINOPHEN 5; 325 MG/1; MG/1
1 TABLET ORAL ONCE
Status: COMPLETED | OUTPATIENT
Start: 2021-11-11 | End: 2021-11-11

## 2021-11-11 RX ORDER — DOCUSATE SODIUM 100 MG/1
100 CAPSULE, LIQUID FILLED ORAL 2 TIMES DAILY
Qty: 10 CAPSULE | Refills: 0 | Status: SHIPPED | OUTPATIENT
Start: 2021-11-11 | End: 2022-04-18

## 2021-11-11 RX ADMIN — OXYCODONE HYDROCHLORIDE AND ACETAMINOPHEN 1 TABLET: 5; 325 TABLET ORAL at 01:00

## 2021-11-11 NOTE — ED TRIAGE NOTES
Pt to ED from home with c/o fall around 1500 today.  Pt reports attempting to catch himself with his hands, landing on his L side.  Pt reports bilateral hand pain, +radial pulses bilateral.  Pt also c/o L rib pain with movement.  Pt denies being on blood thinners or hitting his head.    Pt wearing mask, staff wearing appropriate PPE.

## 2021-11-11 NOTE — ED PROVIDER NOTES
EMERGENCY DEPARTMENT ENCOUNTER    Room Number:  19/19  PCP: Marcelle Fields MD  Historian: Patient  History Limited By: Nothing      HPI  Chief Complaint: Fall  Context: Scar Pérez is a 87 y.o. male who presents to the ED c/o fall.  Patient states he tripped over a wire at home and fell.  Happened about 3 PM.  He did not hit his head did not lose consciousness.  Patient states he was not significantly sore at the time.  He has had increasing pain in his left chest wall.  Pain in both wrists.  Patient has no neck pain or back pain.  Has had no vomiting or diarrhea.  Has had no fevers.      Location: Left chest wall  Radiation: None  Character: Sharp  Duration: 3 PM  Severity: Moderate  Progression: Worsening  Aggravating Factors: Movement  Alleviating Factors: Remaining still        MEDICAL RECORD REVIEW    Patient was admitted in September for pneumonia          PAST MEDICAL HISTORY  Active Ambulatory Problems     Diagnosis Date Noted   • Absolute anemia 02/12/2016   • A-fib (HCC) 02/12/2016   • Arteriosclerosis of coronary artery 02/12/2016   • Bleeding gastrointestinal 02/12/2016   • Lipoma of skin and subcutaneous tissue 02/12/2016   • Obstructive apnea 02/12/2016   • Hyperkalemia 02/12/2016   • Benign essential hypertension 02/12/2016   • Pure hypercholesterolemia 11/11/2016   • Impaired fasting glucose 11/11/2016   • Right upper lobe pneumonia 09/03/2021   • Hypopotassemia 09/05/2021     Resolved Ambulatory Problems     Diagnosis Date Noted   • No Resolved Ambulatory Problems     Past Medical History:   Diagnosis Date   • Abdominal aortic aneurysm (HCC)    • Coronary atherosclerosis    • Esophageal reflux    • Hyperglycemia    • Hyperlipidemia    • Sick sinus syndrome (HCC)          PAST SURGICAL HISTORY  Past Surgical History:   Procedure Laterality Date   • CARDIAC PACEMAKER PLACEMENT     • CARDIAC PACEMAKER PLACEMENT     • COLONOSCOPY  11/05/2014    [facility]    • CORONARY ANGIOPLASTY     • CORONARY  ANGIOPLASTY WITH STENT PLACEMENT     • OTHER SURGICAL HISTORY      CATARACT SURGERY   • OTHER SURGICAL HISTORY      ROTATOR CUFF REPAIR    • UPPER GASTROINTESTINAL ENDOSCOPY  11/03/2014    [facility]         FAMILY HISTORY  Family History   Problem Relation Age of Onset   • Leukemia Father    • Heart disease Maternal Aunt    • Diabetes Daughter          SOCIAL HISTORY  Social History     Socioeconomic History   • Marital status:    Tobacco Use   • Smoking status: Never Smoker   • Smokeless tobacco: Never Used   Substance and Sexual Activity   • Alcohol use: Yes     Comment: BEING A SOCIAL DRINKER   • Drug use: No         ALLERGIES  Iodine        REVIEW OF SYSTEMS  Review of Systems   Constitutional: Negative for activity change, appetite change and fever.   HENT: Negative for congestion and sore throat.    Eyes: Negative.    Respiratory: Negative for cough and shortness of breath.    Cardiovascular: Positive for chest pain. Negative for leg swelling.   Gastrointestinal: Negative for abdominal pain, diarrhea and vomiting.   Endocrine: Negative.    Genitourinary: Negative for decreased urine volume and dysuria.   Musculoskeletal: Positive for joint swelling and myalgias. Negative for neck pain.   Skin: Negative for rash and wound.   Allergic/Immunologic: Negative.    Neurological: Negative for weakness, numbness and headaches.   Hematological: Negative.    Psychiatric/Behavioral: Negative.    All other systems reviewed and are negative.           PHYSICAL EXAM  ED Triage Vitals [11/10/21 2158]   Temp Heart Rate Resp BP SpO2   97.8 °F (36.6 °C) 86 20 158/76 95 %      Temp src Heart Rate Source Patient Position BP Location FiO2 (%)   Tympanic Monitor Sitting Left arm --       Physical Exam  Vitals and nursing note reviewed.   Constitutional:       General: He is not in acute distress.  HENT:      Head: Normocephalic and atraumatic.   Eyes:      Pupils: Pupils are equal, round, and reactive to light.   Cardiovascular:       Rate and Rhythm: Normal rate and regular rhythm.      Heart sounds: Normal heart sounds.   Pulmonary:      Effort: Pulmonary effort is normal. No respiratory distress.      Breath sounds: Normal breath sounds.   Chest:      Chest wall: Tenderness present.   Abdominal:      Palpations: Abdomen is soft.      Tenderness: There is no abdominal tenderness. There is no guarding or rebound.   Musculoskeletal:         General: Normal range of motion.      Cervical back: Normal range of motion and neck supple.      Comments: Patient with tenderness to left knee and bilateral wrists   Skin:     General: Skin is warm and dry.   Neurological:      Mental Status: He is alert and oriented to person, place, and time.      Sensory: Sensation is intact.   Psychiatric:         Mood and Affect: Mood and affect normal.       Patient was wearing a face mask when I entered the room and they continued to wear a mask throughout their stay in the ED.  I wore PPE, including  gloves, face mask with shield or face mask with goggles whenever I was in the room with patient.       LAB RESULTS  No results found for this or any previous visit (from the past 24 hour(s)).    Ordered the above labs and reviewed the results.        RADIOLOGY  CT Chest Without Contrast Diagnostic   Final Result         Electronically signed by Marcelle Tamayo MD on 11-11-21 at 0035      XR Knee 1 or 2 View Left   Final Result         Electronically signed by Les Whitmore MD on 11-10-21 at 2308      XR Wrist 3+ View Bilateral   Final Result         Electronically signed by Les Whitmore MD on 11-10-21 at 2311           Ordered the above noted radiological studies. Reviewed by me in PACS.          PROCEDURES  Procedures            MEDICATIONS GIVEN IN ER  Medications   oxyCODONE-acetaminophen (PERCOCET) 5-325 MG per tablet 1 tablet (1 tablet Oral Given 11/11/21 0100)             PROGRESS AND CONSULTS  ED Course as of 11/11/21 0111   Thu Nov 11, 2021   0045  00:45 EST  Patient here for fall and appears to have 1 isolated rib fracture.  No evidence of pneumothorax.  We will get him an incentive spirometer and pain medicine.  Patient's knee is negative.  Patient's wrists are a little bit of a mystery.  Patient does have bilateral evidence of scapholunate disruption however patient does not have pain in that spot.  And it is bilateral.  Patient does not have tenderness over the triquetrum as well.  Will refer him to hand surgery for follow-up [SL]      ED Course User Index  [SL] Montana Gibson MD           MEDICAL DECISION MAKING      MDM  Number of Diagnoses or Management Options     Amount and/or Complexity of Data Reviewed  Tests in the radiology section of CPT®: reviewed and ordered (CT chest shows isolated rib fracture)               DIAGNOSIS  Final diagnoses:   Closed fracture of one rib of left side, initial encounter   Contusion of left knee, initial encounter   Sprain of left wrist, initial encounter   Wrist sprain, right, initial encounter           DISPOSITION  DISCHARGE    Patient discharged in stable condition.    Reviewed implications of results, diagnosis, meds, responsibility to follow up, warning signs and symptoms of possible worsening, potential complications and reasons to return to ER, including worsening symptoms.    Patient/Family voiced understanding of above instructions.    Discussed plan for discharge, as there is no emergent indication for admission. Patient referred to primary care provider for BP management due to today's BP. Pt/family is agreeable and understands need for follow up and repeat testing.  Pt is aware that discharge does not mean that nothing is wrong but it indicates no emergency is present that requires admission and they must continue care with follow-up as given below or physician of their choice.     FOLLOW-UP  Marcelle Fields MD  [address] [address] [phone]    Schedule an appointment as  soon as possible for a visit       Álvaro Marlow MD  [address] [facility] [address]  [place] [id number] [phone]    Schedule an appointment as soon as possible for a visit           Medication List      New Prescriptions    docusate sodium 100 MG capsule  Commonly known as: COLACE  Take 1 capsule by mouth 2 (Two) Times a Day.     oxyCODONE-acetaminophen 5-325 MG per tablet  Commonly known as: PERCOCET  Take 1 tablet by mouth Every 6 (Six) Hours As Needed for Severe Pain .           Where to Get Your Medications      [facility] [place] [place] [facility] [address] AT [facility] [address] - [phone]  - [phone]   [facility] [facility] [address]    Phone: [phone]   · docusate sodium 100 MG capsule  · oxyCODONE-acetaminophen 5-325 MG per tablet             Latest Documented Vital Signs:  As of 01:11 EST  BP- (!) 184/95 HR- 62 Temp- 97.8 °F (36.6 °C) (Tympanic) O2 sat- 97%                           Montana Gibson MD  11/11/21 0111

## 2021-11-15 ENCOUNTER — OFFICE VISIT (OUTPATIENT)
Dept: INTERNAL MEDICINE | Facility: CLINIC | Age: 86
End: 2021-11-15

## 2021-11-15 VITALS
SYSTOLIC BLOOD PRESSURE: 140 MMHG | HEART RATE: 66 BPM | HEIGHT: 72 IN | WEIGHT: 236 LBS | BODY MASS INDEX: 31.97 KG/M2 | DIASTOLIC BLOOD PRESSURE: 72 MMHG

## 2021-11-15 DIAGNOSIS — I10 BENIGN ESSENTIAL HYPERTENSION: ICD-10-CM

## 2021-11-15 DIAGNOSIS — S22.32XD CLOSED FRACTURE OF ONE RIB OF LEFT SIDE WITH ROUTINE HEALING, SUBSEQUENT ENCOUNTER: Primary | ICD-10-CM

## 2021-11-15 PROCEDURE — 99213 OFFICE O/P EST LOW 20 MIN: CPT | Performed by: INTERNAL MEDICINE

## 2021-11-15 NOTE — PROGRESS NOTES
"Chief Complaint   Patient presents with   • Fall     11/10/2021   • Hypertension       History of Present Illness   Scar Pérez is a 87 y.o. male presents for acute care. Patient was setting up an extension cord to suction leaves. Feet became tangled and he fell and struck his left side, left knee, bilateral hands. No loss of consciousness. No head pain or evidence of trauma. To ER. Found to have rib fractures. Reports that if sitting still he is at a low level of pain. Was having pain when trying to sleep but this has improved. Has incentive spirometry and states he is using it \"quite a bit\". Ct reveals nondisplaced 5th rib fracture.   Has hypertension. Brings recording of bp. Runs 1114-148/60s-70s w/ normal pulse. Reviewed recordings from 9/7 to today. He is taking 50 mg metoprolol bid.       The following portions of the patient's history were reviewed and updated as appropriate: allergies, current medications, past family history, past medical history, past social history, past surgical history and problem list.  Current Outpatient Medications on File Prior to Visit   Medication Sig Dispense Refill   • aspirin 325 MG tablet Take 1 tablet by mouth daily.     • atorvastatin (LIPITOR) 20 MG tablet Take 20 mg by mouth daily.     • Cholecalciferol (VITAMIN D3) 5000 UNITS tablet Take by mouth.     • Cyanocobalamin (B-12) 1000 MCG capsule Take 1 capsule by mouth.     • docusate sodium (COLACE) 100 MG capsule Take 1 capsule by mouth 2 (Two) Times a Day. 10 capsule 0   • hydroCHLOROthiazide (MICROZIDE) 12.5 MG capsule Take 1 capsule by mouth Daily. 90 capsule 2   • hydrocortisone (ANUSOL-HC) 25 MG suppository Hydrocortisone Acetate 25 MG Rectal Suppository; Patient Sig: Hydrocortisone Acetate 25 MG Rectal Suppository INSERT ONE SUPPOSITORY RECTALLY TWICE DAILY AS NEEDED; 12; 4; 19-Nov-2013; Active     • metoprolol tartrate (Lopressor) 50 MG tablet Take 1 tablet by mouth 2 (Two) Times a Day. 60 tablet 4   • niacin " 500 MG tablet Take 1 tablet by mouth daily.     • nitroglycerin (NITROSTAT) 0.4 MG SL tablet Place 0.4 mg under the tongue.     • oxyCODONE-acetaminophen (PERCOCET) 5-325 MG per tablet Take 1 tablet by mouth Every 6 (Six) Hours As Needed for Severe Pain . 15 tablet 0   • pantoprazole (PROTONIX) 40 MG EC tablet Take 1 tablet by mouth 2 (Two) Times a Day. 180 tablet 3   • saccharomyces boulardii (Florastor) 250 MG capsule Take 1 capsule by mouth 2 (Two) Times a Day. 20 capsule 0   • trospium (SANCTURA) 20 MG tablet Take 20 mg by mouth 2 (Two) Times a Day.     • valsartan (Diovan) 80 MG tablet Take 1 tablet by mouth Daily. 30 tablet 3     No current facility-administered medications on file prior to visit.     Review of Systems   Constitutional: Negative.    HENT: Negative.    Eyes: Negative.    Respiratory: Negative.    Cardiovascular: Negative.    Gastrointestinal: Negative.    Endocrine: Negative.    Genitourinary: Negative.    Musculoskeletal:        Left side pain     Skin: Negative.    Allergic/Immunologic: Negative.    Neurological: Negative.    Hematological: Negative.    Psychiatric/Behavioral: Negative.        Objective   Physical Exam  Vitals and nursing note reviewed.   Constitutional:       Appearance: Normal appearance. He is well-developed.   HENT:      Head: Normocephalic and atraumatic.      Right Ear: Tympanic membrane and external ear normal.      Left Ear: Tympanic membrane and external ear normal.      Nose: Nose normal.      Mouth/Throat:      Mouth: Mucous membranes are moist.   Eyes:      Extraocular Movements: Extraocular movements intact.      Pupils: Pupils are equal, round, and reactive to light.   Cardiovascular:      Rate and Rhythm: Normal rate and regular rhythm.      Pulses: Normal pulses.      Heart sounds: Normal heart sounds.   Pulmonary:      Effort: Pulmonary effort is normal. No respiratory distress.      Breath sounds: Normal breath sounds.   Abdominal:      General: Abdomen is  "flat.      Palpations: Abdomen is soft.   Musculoskeletal:         General: Normal range of motion.      Cervical back: Normal range of motion and neck supple.   Skin:     General: Skin is warm and dry.   Neurological:      General: No focal deficit present.      Mental Status: He is alert and oriented to person, place, and time.   Psychiatric:         Mood and Affect: Mood normal.         Behavior: Behavior normal.         Thought Content: Thought content normal.         Judgment: Judgment normal.          /72   Pulse 66   Ht 182.9 cm (72\")   Wt 107 kg (236 lb)   BMI 32.01 kg/m²     Assessment/Plan   Diagnoses and all orders for this visit:    Closed fracture of one rib of left side with routine healing, subsequent encounter    Benign essential hypertension        Patient w/ recent fall and rib fracture. ER records reviewed. Discussed fall precautions w/ patient. He is healing routinely. To use caution w/ opioid medications. May increase tylenol to 2-3 a day. He will continue to monitor bp. Low sodium diet. He will f/u here routinely.            "

## 2022-01-22 ENCOUNTER — ANESTHESIA (OUTPATIENT)
Dept: PERIOP | Facility: HOSPITAL | Age: 87
End: 2022-01-22

## 2022-01-22 ENCOUNTER — ANESTHESIA EVENT (OUTPATIENT)
Dept: PERIOP | Facility: HOSPITAL | Age: 87
End: 2022-01-22

## 2022-01-22 ENCOUNTER — HOSPITAL ENCOUNTER (OUTPATIENT)
Facility: HOSPITAL | Age: 87
Discharge: HOME | End: 2022-01-22
Attending: EMERGENCY MEDICINE | Admitting: INTERNAL MEDICINE

## 2022-01-22 ENCOUNTER — READMISSION MANAGEMENT (OUTPATIENT)
Dept: CALL CENTER | Facility: HOSPITAL | Age: 87
End: 2022-01-22

## 2022-01-22 ENCOUNTER — APPOINTMENT (OUTPATIENT)
Dept: GENERAL RADIOLOGY | Facility: HOSPITAL | Age: 87
End: 2022-01-22

## 2022-01-22 VITALS
OXYGEN SATURATION: 95 % | DIASTOLIC BLOOD PRESSURE: 80 MMHG | WEIGHT: 231 LBS | BODY MASS INDEX: 32.34 KG/M2 | SYSTOLIC BLOOD PRESSURE: 160 MMHG | HEART RATE: 65 BPM | TEMPERATURE: 97.9 F | HEIGHT: 71 IN | RESPIRATION RATE: 16 BRPM

## 2022-01-22 DIAGNOSIS — T18.128A FOOD IMPACTION OF ESOPHAGUS, INITIAL ENCOUNTER: Primary | ICD-10-CM

## 2022-01-22 DIAGNOSIS — R11.2 NAUSEA AND VOMITING IN ADULT: ICD-10-CM

## 2022-01-22 DIAGNOSIS — U07.1 ASYMPTOMATIC COVID-19 VIRUS INFECTION: ICD-10-CM

## 2022-01-22 DIAGNOSIS — K56.699 FOOD BOLUS OBSTRUCTION OF INTESTINE: ICD-10-CM

## 2022-01-22 LAB
ALBUMIN SERPL-MCNC: 3.9 G/DL (ref 3.5–5.2)
ALBUMIN/GLOB SERPL: 1.6 G/DL
ALP SERPL-CCNC: 76 U/L (ref 39–117)
ALT SERPL W P-5'-P-CCNC: 21 U/L (ref 1–41)
ANION GAP SERPL CALCULATED.3IONS-SCNC: 8.2 MMOL/L (ref 5–15)
AST SERPL-CCNC: 16 U/L (ref 1–40)
BASOPHILS # BLD AUTO: 0.04 10*3/MM3 (ref 0–0.2)
BASOPHILS NFR BLD AUTO: 0.5 % (ref 0–1.5)
BILIRUB SERPL-MCNC: 1.1 MG/DL (ref 0–1.2)
BUN SERPL-MCNC: 18 MG/DL (ref 8–23)
BUN/CREAT SERPL: 25.4 (ref 7–25)
CALCIUM SPEC-SCNC: 8.8 MG/DL (ref 8.6–10.5)
CHLORIDE SERPL-SCNC: 95 MMOL/L (ref 98–107)
CO2 SERPL-SCNC: 26.8 MMOL/L (ref 22–29)
CREAT SERPL-MCNC: 0.71 MG/DL (ref 0.76–1.27)
DEPRECATED RDW RBC AUTO: 38.8 FL (ref 37–54)
EOSINOPHIL # BLD AUTO: 0.38 10*3/MM3 (ref 0–0.4)
EOSINOPHIL NFR BLD AUTO: 4.7 % (ref 0.3–6.2)
ERYTHROCYTE [DISTWIDTH] IN BLOOD BY AUTOMATED COUNT: 11.9 % (ref 12.3–15.4)
GFR SERPL CREATININE-BSD FRML MDRD: 105 ML/MIN/1.73
GLOBULIN UR ELPH-MCNC: 2.5 GM/DL
GLUCOSE SERPL-MCNC: 106 MG/DL (ref 65–99)
HCT VFR BLD AUTO: 44.4 % (ref 37.5–51)
HGB BLD-MCNC: 15.1 G/DL (ref 13–17.7)
HOLD SPECIMEN: NORMAL
HOLD SPECIMEN: NORMAL
IMM GRANULOCYTES # BLD AUTO: 0.04 10*3/MM3 (ref 0–0.05)
IMM GRANULOCYTES NFR BLD AUTO: 0.5 % (ref 0–0.5)
LYMPHOCYTES # BLD AUTO: 1.23 10*3/MM3 (ref 0.7–3.1)
LYMPHOCYTES NFR BLD AUTO: 15.2 % (ref 19.6–45.3)
MCH RBC QN AUTO: 30.6 PG (ref 26.6–33)
MCHC RBC AUTO-ENTMCNC: 34 G/DL (ref 31.5–35.7)
MCV RBC AUTO: 89.9 FL (ref 79–97)
MONOCYTES # BLD AUTO: 0.9 10*3/MM3 (ref 0.1–0.9)
MONOCYTES NFR BLD AUTO: 11.2 % (ref 5–12)
NEUTROPHILS NFR BLD AUTO: 5.48 10*3/MM3 (ref 1.7–7)
NEUTROPHILS NFR BLD AUTO: 67.9 % (ref 42.7–76)
NRBC BLD AUTO-RTO: 0 /100 WBC (ref 0–0.2)
PLATELET # BLD AUTO: 212 10*3/MM3 (ref 140–450)
PMV BLD AUTO: 8.6 FL (ref 6–12)
POTASSIUM SERPL-SCNC: 3.9 MMOL/L (ref 3.5–5.2)
PROT SERPL-MCNC: 6.4 G/DL (ref 6–8.5)
QT INTERVAL: 469 MS
RBC # BLD AUTO: 4.94 10*6/MM3 (ref 4.14–5.8)
SARS-COV-2 RNA RESP QL NAA+PROBE: DETECTED
SODIUM SERPL-SCNC: 130 MMOL/L (ref 136–145)
WBC NRBC COR # BLD: 8.07 10*3/MM3 (ref 3.4–10.8)
WHOLE BLOOD HOLD SPECIMEN: NORMAL
WHOLE BLOOD HOLD SPECIMEN: NORMAL

## 2022-01-22 PROCEDURE — G0378 HOSPITAL OBSERVATION PER HR: HCPCS

## 2022-01-22 PROCEDURE — 88312 SPECIAL STAINS GROUP 1: CPT | Performed by: INTERNAL MEDICINE

## 2022-01-22 PROCEDURE — 25010000002 DEXAMETHASONE PER 1 MG: Performed by: NURSE ANESTHETIST, CERTIFIED REGISTERED

## 2022-01-22 PROCEDURE — 43247 EGD REMOVE FOREIGN BODY: CPT | Performed by: INTERNAL MEDICINE

## 2022-01-22 PROCEDURE — 25010000002 ONDANSETRON PER 1 MG: Performed by: NURSE PRACTITIONER

## 2022-01-22 PROCEDURE — U0005 INFEC AGEN DETEC AMPLI PROBE: HCPCS | Performed by: NURSE PRACTITIONER

## 2022-01-22 PROCEDURE — C9803 HOPD COVID-19 SPEC COLLECT: HCPCS

## 2022-01-22 PROCEDURE — 99284 EMERGENCY DEPT VISIT MOD MDM: CPT

## 2022-01-22 PROCEDURE — 93010 ELECTROCARDIOGRAM REPORT: CPT | Performed by: INTERNAL MEDICINE

## 2022-01-22 PROCEDURE — 71045 X-RAY EXAM CHEST 1 VIEW: CPT

## 2022-01-22 PROCEDURE — 80053 COMPREHEN METABOLIC PANEL: CPT | Performed by: NURSE PRACTITIONER

## 2022-01-22 PROCEDURE — 25010000002 SUCCINYLCHOLINE PER 20 MG: Performed by: NURSE ANESTHETIST, CERTIFIED REGISTERED

## 2022-01-22 PROCEDURE — 25010000002 ONDANSETRON PER 1 MG: Performed by: NURSE ANESTHETIST, CERTIFIED REGISTERED

## 2022-01-22 PROCEDURE — 96374 THER/PROPH/DIAG INJ IV PUSH: CPT

## 2022-01-22 PROCEDURE — 85025 COMPLETE CBC W/AUTO DIFF WBC: CPT | Performed by: NURSE PRACTITIONER

## 2022-01-22 PROCEDURE — 93005 ELECTROCARDIOGRAM TRACING: CPT | Performed by: NURSE PRACTITIONER

## 2022-01-22 PROCEDURE — 99214 OFFICE O/P EST MOD 30 MIN: CPT | Performed by: INTERNAL MEDICINE

## 2022-01-22 PROCEDURE — U0003 INFECTIOUS AGENT DETECTION BY NUCLEIC ACID (DNA OR RNA); SEVERE ACUTE RESPIRATORY SYNDROME CORONAVIRUS 2 (SARS-COV-2) (CORONAVIRUS DISEASE [COVID-19]), AMPLIFIED PROBE TECHNIQUE, MAKING USE OF HIGH THROUGHPUT TECHNOLOGIES AS DESCRIBED BY CMS-2020-01-R: HCPCS | Performed by: NURSE PRACTITIONER

## 2022-01-22 PROCEDURE — 88305 TISSUE EXAM BY PATHOLOGIST: CPT | Performed by: INTERNAL MEDICINE

## 2022-01-22 PROCEDURE — 25010000002 PROPOFOL 10 MG/ML EMULSION: Performed by: NURSE ANESTHETIST, CERTIFIED REGISTERED

## 2022-01-22 PROCEDURE — 43239 EGD BIOPSY SINGLE/MULTIPLE: CPT | Performed by: INTERNAL MEDICINE

## 2022-01-22 RX ORDER — SODIUM CHLORIDE 0.9 % (FLUSH) 0.9 %
10 SYRINGE (ML) INJECTION AS NEEDED
Status: DISCONTINUED | OUTPATIENT
Start: 2022-01-22 | End: 2022-01-22 | Stop reason: HOSPADM

## 2022-01-22 RX ORDER — DIPHENHYDRAMINE HCL 25 MG
25 CAPSULE ORAL
Status: DISCONTINUED | OUTPATIENT
Start: 2022-01-22 | End: 2022-01-22 | Stop reason: HOSPADM

## 2022-01-22 RX ORDER — DEXAMETHASONE SODIUM PHOSPHATE 10 MG/ML
INJECTION INTRAMUSCULAR; INTRAVENOUS AS NEEDED
Status: DISCONTINUED | OUTPATIENT
Start: 2022-01-22 | End: 2022-01-22 | Stop reason: SURG

## 2022-01-22 RX ORDER — HYDRALAZINE HYDROCHLORIDE 20 MG/ML
5 INJECTION INTRAMUSCULAR; INTRAVENOUS
Status: DISCONTINUED | OUTPATIENT
Start: 2022-01-22 | End: 2022-01-22 | Stop reason: HOSPADM

## 2022-01-22 RX ORDER — ONDANSETRON 2 MG/ML
4 INJECTION INTRAMUSCULAR; INTRAVENOUS ONCE
Status: COMPLETED | OUTPATIENT
Start: 2022-01-22 | End: 2022-01-22

## 2022-01-22 RX ORDER — PROMETHAZINE HYDROCHLORIDE 25 MG/1
25 TABLET ORAL ONCE AS NEEDED
Status: DISCONTINUED | OUTPATIENT
Start: 2022-01-22 | End: 2022-01-22 | Stop reason: HOSPADM

## 2022-01-22 RX ORDER — PROPOFOL 10 MG/ML
VIAL (ML) INTRAVENOUS AS NEEDED
Status: DISCONTINUED | OUTPATIENT
Start: 2022-01-22 | End: 2022-01-22 | Stop reason: SURG

## 2022-01-22 RX ORDER — FENTANYL CITRATE 50 UG/ML
25 INJECTION, SOLUTION INTRAMUSCULAR; INTRAVENOUS
Status: DISCONTINUED | OUTPATIENT
Start: 2022-01-22 | End: 2022-01-22 | Stop reason: HOSPADM

## 2022-01-22 RX ORDER — EPHEDRINE SULFATE 50 MG/ML
5 INJECTION, SOLUTION INTRAVENOUS ONCE AS NEEDED
Status: DISCONTINUED | OUTPATIENT
Start: 2022-01-22 | End: 2022-01-22 | Stop reason: HOSPADM

## 2022-01-22 RX ORDER — LABETALOL HYDROCHLORIDE 5 MG/ML
5 INJECTION, SOLUTION INTRAVENOUS
Status: DISCONTINUED | OUTPATIENT
Start: 2022-01-22 | End: 2022-01-22 | Stop reason: HOSPADM

## 2022-01-22 RX ORDER — SODIUM CHLORIDE, SODIUM LACTATE, POTASSIUM CHLORIDE, CALCIUM CHLORIDE 600; 310; 30; 20 MG/100ML; MG/100ML; MG/100ML; MG/100ML
INJECTION, SOLUTION INTRAVENOUS CONTINUOUS PRN
Status: DISCONTINUED | OUTPATIENT
Start: 2022-01-22 | End: 2022-01-22 | Stop reason: SURG

## 2022-01-22 RX ORDER — PROMETHAZINE HYDROCHLORIDE 25 MG/1
25 SUPPOSITORY RECTAL ONCE AS NEEDED
Status: DISCONTINUED | OUTPATIENT
Start: 2022-01-22 | End: 2022-01-22 | Stop reason: HOSPADM

## 2022-01-22 RX ORDER — ONDANSETRON 2 MG/ML
4 INJECTION INTRAMUSCULAR; INTRAVENOUS ONCE AS NEEDED
Status: DISCONTINUED | OUTPATIENT
Start: 2022-01-22 | End: 2022-01-22 | Stop reason: HOSPADM

## 2022-01-22 RX ORDER — NALOXONE HCL 0.4 MG/ML
0.2 VIAL (ML) INJECTION AS NEEDED
Status: DISCONTINUED | OUTPATIENT
Start: 2022-01-22 | End: 2022-01-22 | Stop reason: HOSPADM

## 2022-01-22 RX ORDER — LIDOCAINE HYDROCHLORIDE 20 MG/ML
INJECTION, SOLUTION INFILTRATION; PERINEURAL AS NEEDED
Status: DISCONTINUED | OUTPATIENT
Start: 2022-01-22 | End: 2022-01-22 | Stop reason: SURG

## 2022-01-22 RX ORDER — SUCCINYLCHOLINE CHLORIDE 20 MG/ML
INJECTION INTRAMUSCULAR; INTRAVENOUS AS NEEDED
Status: DISCONTINUED | OUTPATIENT
Start: 2022-01-22 | End: 2022-01-22 | Stop reason: SURG

## 2022-01-22 RX ORDER — DIPHENHYDRAMINE HYDROCHLORIDE 50 MG/ML
12.5 INJECTION INTRAMUSCULAR; INTRAVENOUS
Status: DISCONTINUED | OUTPATIENT
Start: 2022-01-22 | End: 2022-01-22 | Stop reason: HOSPADM

## 2022-01-22 RX ORDER — ONDANSETRON 2 MG/ML
INJECTION INTRAMUSCULAR; INTRAVENOUS AS NEEDED
Status: DISCONTINUED | OUTPATIENT
Start: 2022-01-22 | End: 2022-01-22 | Stop reason: SURG

## 2022-01-22 RX ADMIN — SUCCINYLCHOLINE CHLORIDE 160 MG: 20 INJECTION, SOLUTION INTRAMUSCULAR; INTRAVENOUS; PARENTERAL at 11:27

## 2022-01-22 RX ADMIN — ONDANSETRON 4 MG: 2 INJECTION INTRAMUSCULAR; INTRAVENOUS at 11:31

## 2022-01-22 RX ADMIN — PROPOFOL 150 MG: 10 INJECTION, EMULSION INTRAVENOUS at 11:27

## 2022-01-22 RX ADMIN — SODIUM CHLORIDE, POTASSIUM CHLORIDE, SODIUM LACTATE AND CALCIUM CHLORIDE: 600; 310; 30; 20 INJECTION, SOLUTION INTRAVENOUS at 11:17

## 2022-01-22 RX ADMIN — LIDOCAINE HYDROCHLORIDE 100 MG: 20 INJECTION, SOLUTION INFILTRATION; PERINEURAL at 11:27

## 2022-01-22 RX ADMIN — DEXAMETHASONE SODIUM PHOSPHATE 8 MG: 10 INJECTION INTRAMUSCULAR; INTRAVENOUS at 11:31

## 2022-01-22 RX ADMIN — ONDANSETRON 4 MG: 2 INJECTION INTRAMUSCULAR; INTRAVENOUS at 07:44

## 2022-01-22 NOTE — ED TRIAGE NOTES
Pt to ED via PV from home accompanied by spouse. Pt reports he believes he has a piece of hamburger stuck in his throat since yesterday at lunch time. Pt reports that it was mildly uncomfortable at first but since then has progressed where he is having difficulty swallowing any fluids. Pt is able to tolerate his own secretions at the time of triage and speech is normal at this time.     This RN in appropriate PPE for all patient care interactions. Pt masked and redirected for proper mask use when necessary. Hand hygiene performed before and after all patient care interactions.

## 2022-01-22 NOTE — OUTREACH NOTE
Prep Survey      Responses   [facility] patient discharged from? [place]   Is LACE score < 7 ? Yes   Emergency Room discharge w/ pulse ox? No   Eligibility Not Eligible   [facility]   Date of Admission 01/22/22   Date of Discharge 01/22/22   Discharge Disposition Home or Self Care   What are the reasons patient is not eligible? Other  [OP proc]   Discharge diagnosis Esophageal foreign body   Does the patient have one of the following disease processes/diagnoses(primary or secondary)? Other   Does the patient have Home health ordered? No   Is there a DME ordered? No   Prep survey completed? Yes          Daiana Mccrary RN

## 2022-01-22 NOTE — ADDENDUM NOTE
Addendum  created 01/22/22 1250 by Eric Aguilera MD    Attestation recorded in Intraprocedure, Intraprocedure Attestations filed

## 2022-01-22 NOTE — ANESTHESIA PROCEDURE NOTES
Airway  Urgency: elective    Date/Time: 1/22/2022 11:27 AM  Airway not difficult    General Information and Staff    Patient location during procedure: OR  Anesthesiologist: Eric Aguilera MD  CRNA: Shanice Hernandez CRNA    Indications and Patient Condition  Indications for airway management: airway protection    Preoxygenated: yes  MILS not maintained throughout  Mask difficulty assessment: 0 - not attempted    Final Airway Details  Final airway type: endotracheal airway      Successful airway: ETT  Cuffed: yes   Successful intubation technique: direct laryngoscopy and RSI  Facilitating devices/methods: intubating stylet and cricoid pressure  Endotracheal tube insertion site: oral  Blade: CMAC  Blade size: D  ETT size (mm): 7.0  Cormack-Lehane Classification: grade I - full view of glottis  Placement verified by: chest auscultation and capnometry   Cuff volume (mL): 9  Measured from: lips  ETT/EBT  to lips (cm): 22  Number of attempts at approach: 1  Assessment: lips, teeth, and gum same as pre-op and atraumatic intubation    Additional Comments  Pt preoxygenated prior to induction,, atraumatic intubation,+ ETCO2, + bs bilat,  ETT secured and connected to ventilator.

## 2022-01-22 NOTE — ED PROVIDER NOTES
EMERGENCY DEPARTMENT ENCOUNTER    Room Number:  08/08  Date of encounter:  1/22/2022  PCP: Marcelle Fields MD  Historian: patient   Full history not obtainable due to: none     HPI:  Chief Complaint: Retained food bolus    Context: Scar Pérez is a 87 y.o. male who presents to the ED c/o retained food bolus onset yesterday afternoon after eating a hamburger. He believes a piece of the hamburger became lodged in his throat. He has been unable to swallow any food or water since. He tried to drink water but it did not help. He vomits after anything he eats or drinks.    Hx of esophageal stricture with EGD dilatation and retained food bolus years ago.        PAST MEDICAL HISTORY    Active Ambulatory Problems     Diagnosis Date Noted   • Absolute anemia 02/12/2016   • A-fib (HCC) 02/12/2016   • Arteriosclerosis of coronary artery 02/12/2016   • Bleeding gastrointestinal 02/12/2016   • Lipoma of skin and subcutaneous tissue 02/12/2016   • Obstructive apnea 02/12/2016   • Hyperkalemia 02/12/2016   • Benign essential hypertension 02/12/2016   • Pure hypercholesterolemia 11/11/2016   • Impaired fasting glucose 11/11/2016   • Right upper lobe pneumonia 09/03/2021   • Hypopotassemia 09/05/2021     Resolved Ambulatory Problems     Diagnosis Date Noted   • No Resolved Ambulatory Problems     Past Medical History:   Diagnosis Date   • Abdominal aortic aneurysm (HCC)    • Coronary atherosclerosis    • Esophageal reflux    • Hyperglycemia    • Hyperlipidemia    • Sick sinus syndrome (HCC)          PAST SURGICAL HISTORY  Past Surgical History:   Procedure Laterality Date   • CARDIAC PACEMAKER PLACEMENT     • CARDIAC PACEMAKER PLACEMENT     • COLONOSCOPY  11/05/2014    [facility]    • CORONARY ANGIOPLASTY     • CORONARY ANGIOPLASTY WITH STENT PLACEMENT     • OTHER SURGICAL HISTORY      CATARACT SURGERY   • OTHER SURGICAL HISTORY      ROTATOR CUFF REPAIR    • UPPER GASTROINTESTINAL ENDOSCOPY  11/03/2014    [facility]         FAMILY  HISTORY  Family History   Problem Relation Age of Onset   • Leukemia Father    • Heart disease Maternal Aunt    • Diabetes Daughter          SOCIAL HISTORY  Social History     Socioeconomic History   • Marital status:    Tobacco Use   • Smoking status: Never Smoker   • Smokeless tobacco: Never Used   Substance and Sexual Activity   • Alcohol use: Yes     Comment: BEING A SOCIAL DRINKER   • Drug use: No         ALLERGIES  Iodine        REVIEW OF SYSTEMS  Review of Systems   All systems reviewed and marked as negative except as listed in HPI       PHYSICAL EXAM    I have reviewed the triage vital signs and nursing notes.    ED Triage Vitals [01/22/22 0536]   Temp Heart Rate Resp BP SpO2   97.8 °F (36.6 °C) 74 16 -- 95 %      Temp src Heart Rate Source Patient Position BP Location FiO2 (%)   Tympanic Monitor -- -- --       GENERAL: alert well developed, well nourished in no distress. Elderly appearing.   HENT: NCAT, neck supple, trachea midline. POP is clear.   EYES: no scleral icterus, PERRL, normal conjunctivae  CV: regular rhythm, regular rate, no murmur  RESPIRATORY: unlabored effort, CTAB  ABDOMEN: soft, nontender, nondistended, bowel sounds present  MUSCULOSKELETAL: no gross deformity  NEURO: alert,  sensory and motor function of extremities grossly intact, speech clear, mental status normal/baseline  SKIN: warm, dry, no rash  PSYCH:  Appropriate mood and affect    Vital signs and nursing notes reviewed.          LAB RESULTS  Recent Results (from the past 24 hour(s))   COVID-19,[facility] IN-HOUSE CEPHEID/[facility] NP SWAB IN TRANSPORT MEDIA 8-12 HR TAT - Swab, Nasopharynx    Collection Time: 01/22/22  6:53 AM    Specimen: Nasopharynx; Swab   Result Value Ref Range    COVID19 Detected (C) Not Detected - Ref. Range   ECG 12 Lead    Collection Time: 01/22/22  6:54 AM   Result Value Ref Range    QT Interval 469 ms   Comprehensive Metabolic Panel    Collection Time: 01/22/22  7:13 AM    Specimen: Blood   Result Value  Ref Range    Glucose 106 (H) 65 - 99 mg/dL    BUN 18 8 - 23 mg/dL    Creatinine 0.71 (L) 0.76 - 1.27 mg/dL    Sodium 130 (L) 136 - 145 mmol/L    Potassium 3.9 3.5 - 5.2 mmol/L    Chloride 95 (L) 98 - 107 mmol/L    CO2 26.8 22.0 - 29.0 mmol/L    Calcium 8.8 8.6 - 10.5 mg/dL    Total Protein 6.4 6.0 - 8.5 g/dL    Albumin 3.90 3.50 - 5.20 g/dL    ALT (SGPT) 21 1 - 41 U/L    AST (SGOT) 16 1 - 40 U/L    Alkaline Phosphatase 76 39 - 117 U/L    Total Bilirubin 1.1 0.0 - 1.2 mg/dL    eGFR Non African Amer 105 >60 mL/min/1.73    Globulin 2.5 gm/dL    A/G Ratio 1.6 g/dL    BUN/Creatinine Ratio 25.4 (H) 7.0 - 25.0    Anion Gap 8.2 5.0 - 15.0 mmol/L   CBC Auto Differential    Collection Time: 01/22/22  7:13 AM    Specimen: Blood   Result Value Ref Range    WBC 8.07 3.40 - 10.80 10*3/mm3    RBC 4.94 4.14 - 5.80 10*6/mm3    Hemoglobin 15.1 13.0 - 17.7 g/dL    Hematocrit 44.4 37.5 - 51.0 %    MCV 89.9 79.0 - 97.0 fL    MCH 30.6 26.6 - 33.0 pg    MCHC 34.0 31.5 - 35.7 g/dL    RDW 11.9 (L) 12.3 - 15.4 %    RDW-SD 38.8 37.0 - 54.0 fl    MPV 8.6 6.0 - 12.0 fL    Platelets 212 140 - 450 10*3/mm3    Neutrophil % 67.9 42.7 - 76.0 %    Lymphocyte % 15.2 (L) 19.6 - 45.3 %    Monocyte % 11.2 5.0 - 12.0 %    Eosinophil % 4.7 0.3 - 6.2 %    Basophil % 0.5 0.0 - 1.5 %    Immature Grans % 0.5 0.0 - 0.5 %    Neutrophils, Absolute 5.48 1.70 - 7.00 10*3/mm3    Lymphocytes, Absolute 1.23 0.70 - 3.10 10*3/mm3    Monocytes, Absolute 0.90 0.10 - 0.90 10*3/mm3    Eosinophils, Absolute 0.38 0.00 - 0.40 10*3/mm3    Basophils, Absolute 0.04 0.00 - 0.20 10*3/mm3    Immature Grans, Absolute 0.04 0.00 - 0.05 10*3/mm3    nRBC 0.0 0.0 - 0.2 /100 WBC   Green Top (Gel)    Collection Time: 01/22/22  7:13 AM   Result Value Ref Range    Extra Tube Hold for add-ons.    Lavender Top    Collection Time: 01/22/22  7:13 AM   Result Value Ref Range    Extra Tube hold for add-on    Gold Top - SST    Collection Time: 01/22/22  7:13 AM   Result Value Ref Range    Extra Tube  Hold for add-ons.    Light Blue Top    Collection Time: 01/22/22  7:13 AM   Result Value Ref Range    Extra Tube hold for add-on        Ordered the above labs and independently reviewed the results.        RADIOLOGY  XR Chest 1 View    Result Date: 1/22/2022  ONE VIEW PORTABLE CHEST  HISTORY: Retained food bolus.  FINDINGS: The lungs are well-expanded and clear except for a calcified granuloma in the lower right lung. There is mild cardiomegaly with a pacemaker in place and the cardiomegaly is unchanged from 09/03/2021.  This report was finalized on 1/22/2022 7:31 AM by Dr. Anupam Chong M.D.        I ordered the above noted radiological studies. Independently reviewed by me and discussed with radiologist.  See dictation above for official radiology interpretation.      PROCEDURES    Procedures        MEDICATIONS GIVEN IN ER    Medications   sodium chloride 0.9 % flush 10 mL (has no administration in time range)   ondansetron (ZOFRAN) injection 4 mg (4 mg Intravenous Given 1/22/22 0744)         PROGRESS, DATA ANALYSIS, CONSULTS, AND MEDICAL DECISION MAKING    All labs have been independently reviewed by me.  All radiology studies have been reviewed by me.   EKG's independently reviewed by me.  Discussion below represents my analysis of pertinent findings related to patient's condition, differential diagnosis, treatment plan and final disposition.    DIFFERENTIAL DIAGNOSIS INCLUDE BUT NOT LIMITED TO: dyspepsia, GERD, barrets esophagus, esophagitis, retained food bolus, aspiration pneumonia     ED Course as of 01/22/22 1019   Sat Jan 22, 2022   0642 Discussed pt with Dr Acevedo. He requests cbc cmp ekg and cxr and he will come see the pt  [JS]   3785 Dr Acevedo at bedside. He states he will take the pt to the OR for EGD today, time is unknown at this point as he is awaiting from guidance from the endoscopy department.  [JS]   7667 I viewed CXR on pacs system. My findings are no cm. Implanted PM device is noted to SHANICE  chest wall    [JS]   0725 Creatinine(!): 0.71 [JS]   0753 WBC: 8.07 [JS]   0753 Hemoglobin: 15.1 [JS]   0823 COVID19(!!): Detected [TR]   1015 Pt is incidentally positive for covid 19 infection. Dr Acevedo requests consultation from hospitalist group for possible admission.  [JS]   1015 Discussed pt with Marlene TROTTER for [facility] who agrees to admit the pt to Dr Del Toro  [JS]      ED Course User Index  [JS] Paty Aguilera APRN  [TR] [place] Hayden MD       AS OF 10:19 EST VITALS:        BP - 159/85  HR - 61  TEMP - 97.8 °F (36.6 °C) (Tympanic)  O2 SATS - 94%        DIAGNOSIS  Final diagnoses:   Food impaction of esophagus, initial encounter   Nausea and vomiting in adult   Asymptomatic COVID-19 virus infection         DISPOSITION  Send to OR     Pt masked in first look. I wore appropriate PPE throughout my encounters with the pt. I performed hand hygiene on entry into the pt room and upon exit.     Dictated utilizing Dragon dictation:  Much of this encounter note is an electronic transcription/translation of spoken language to printed text. The electronic translation of spoken language may permit erroneous, or at times, nonsensical words or phrases to be inadvertently transcribed; Although I have reviewed the note for such errors, some may still exist.     Paty Aguilera, SERGO  01/22/22 0753       Paty Aguilera, SERGO  01/22/22 1019

## 2022-01-22 NOTE — PROGRESS NOTES
GI - I did speak to Dr. Del Toro with [facility] to see if this patient needs to be admitted given that he is Covid+ and 86 yo. Given that he is asymptomatic and his oxygen sat has been 94-96% throughout his hospital stay that the patient did not need to be admitted.   Alfredo Acevedo M.D.

## 2022-01-22 NOTE — PROGRESS NOTES
Pt admitted for esophageal food bolus and incidentally found to be COViD positive  D/w Dr. Acevedo  Pt is not hypoxic, has no COViD symptoms, is fully vaccinated, labs are wnl, and CXR shows no infiltrates  He has no pulmonary disease or diabetes  He does not need to be admitted to hospital for COViD  Would recommend he call his PCP Monday AM to discuss his diagnosis

## 2022-01-22 NOTE — ANESTHESIA POSTPROCEDURE EVALUATION
"Patient: Scar Pérez    Procedure Summary     Date: 01/22/22 Room / Location: [facility] OR  / [facility] MAIN OR    Anesthesia Start: 1117 Anesthesia Stop: 1214    Procedure: ESOPHAGOGASTRODUODENOSCOPY FOR FOOD BOLUS (N/A Esophagus) Diagnosis:     Surgeons: Alfredo Acevedo MD Provider: Eric Aguilera MD    Anesthesia Type: general ASA Status: 3 - Emergent          Anesthesia Type: No value filed.    Vitals  Vitals Value Taken Time   /77 01/22/22 1221   Temp 37 °C (98.6 °F) 01/22/22 1209   Pulse 63 01/22/22 1233   Resp 16 01/22/22 1220   SpO2 88 % 01/22/22 1233   Vitals shown include unvalidated device data.        Post Anesthesia Care and Evaluation    Patient location during evaluation: PACU  Patient participation: complete - patient participated  Level of consciousness: awake and alert  Pain management: adequate  Airway patency: patent  Anesthetic complications: No anesthetic complications    Cardiovascular status: acceptable  Respiratory status: acceptable  Hydration status: acceptable    Comments: /77 (BP Location: Right arm, Patient Position: Lying)   Pulse 62   Temp 37 °C (98.6 °F) (Oral)   Resp 16   Ht 180.3 cm (71\")   Wt 105 kg (231 lb)   SpO2 95%   BMI 32.22 kg/m²         "

## 2022-01-22 NOTE — ED PROVIDER NOTES
MD ATTESTATION NOTE    The SAMEERA and I have discussed this patient's history, physical exam, and treatment plan.  I have reviewed the documentation and personally had a face to face interaction with the patient. I affirm the documentation and agree with the treatment and plan.  The attached note describes my personal findings.    I provided a substantive portion of the care of this patient. I personally performed the physical exam, in its entirety.    Scar Pérez is a 87 y.o. male who presents to the ED c/o having a food bolus in his esophagus.  He reports that yesterday around lunchtime he was eating hamburger and felt it get stuck in his throat.  He states this is occurred in the past.  He states his symptoms have not improved since then.  He states that he cannot vomit the food up or swallow.  He states he is required a scope in this past.  He has no chest pain or shortness of breath.  Nothing makes his symptoms worse or better.      On exam:  GENERAL: Awake, alert, no acute distress  SKIN: Warm, dry  HENT: Normocephalic, atraumatic  EYES: no scleral icterus  CV: regular rhythm, regular rate  RESPIRATORY: normal effort, lungs clear  ABDOMEN: soft, nontender, nondistended  MUSCULOSKELETAL: no deformity  NEURO: alert, moves all extremities, follows commands    Labs  Recent Results (from the past 24 hour(s))   COVID-19,[facility] IN-HOUSE CEPHEID/[facility] NP SWAB IN TRANSPORT MEDIA 8-12 HR TAT - Swab, Nasopharynx    Collection Time: 01/22/22  6:53 AM    Specimen: Nasopharynx; Swab   Result Value Ref Range    COVID19 Detected (C) Not Detected - Ref. Range   ECG 12 Lead    Collection Time: 01/22/22  6:54 AM   Result Value Ref Range    QT Interval 469 ms   Comprehensive Metabolic Panel    Collection Time: 01/22/22  7:13 AM    Specimen: Blood   Result Value Ref Range    Glucose 106 (H) 65 - 99 mg/dL    BUN 18 8 - 23 mg/dL    Creatinine 0.71 (L) 0.76 - 1.27 mg/dL    Sodium 130 (L) 136 - 145 mmol/L    Potassium 3.9 3.5 - 5.2  mmol/L    Chloride 95 (L) 98 - 107 mmol/L    CO2 26.8 22.0 - 29.0 mmol/L    Calcium 8.8 8.6 - 10.5 mg/dL    Total Protein 6.4 6.0 - 8.5 g/dL    Albumin 3.90 3.50 - 5.20 g/dL    ALT (SGPT) 21 1 - 41 U/L    AST (SGOT) 16 1 - 40 U/L    Alkaline Phosphatase 76 39 - 117 U/L    Total Bilirubin 1.1 0.0 - 1.2 mg/dL    eGFR Non African Amer 105 >60 mL/min/1.73    Globulin 2.5 gm/dL    A/G Ratio 1.6 g/dL    BUN/Creatinine Ratio 25.4 (H) 7.0 - 25.0    Anion Gap 8.2 5.0 - 15.0 mmol/L   CBC Auto Differential    Collection Time: 01/22/22  7:13 AM    Specimen: Blood   Result Value Ref Range    WBC 8.07 3.40 - 10.80 10*3/mm3    RBC 4.94 4.14 - 5.80 10*6/mm3    Hemoglobin 15.1 13.0 - 17.7 g/dL    Hematocrit 44.4 37.5 - 51.0 %    MCV 89.9 79.0 - 97.0 fL    MCH 30.6 26.6 - 33.0 pg    MCHC 34.0 31.5 - 35.7 g/dL    RDW 11.9 (L) 12.3 - 15.4 %    RDW-SD 38.8 37.0 - 54.0 fl    MPV 8.6 6.0 - 12.0 fL    Platelets 212 140 - 450 10*3/mm3    Neutrophil % 67.9 42.7 - 76.0 %    Lymphocyte % 15.2 (L) 19.6 - 45.3 %    Monocyte % 11.2 5.0 - 12.0 %    Eosinophil % 4.7 0.3 - 6.2 %    Basophil % 0.5 0.0 - 1.5 %    Immature Grans % 0.5 0.0 - 0.5 %    Neutrophils, Absolute 5.48 1.70 - 7.00 10*3/mm3    Lymphocytes, Absolute 1.23 0.70 - 3.10 10*3/mm3    Monocytes, Absolute 0.90 0.10 - 0.90 10*3/mm3    Eosinophils, Absolute 0.38 0.00 - 0.40 10*3/mm3    Basophils, Absolute 0.04 0.00 - 0.20 10*3/mm3    Immature Grans, Absolute 0.04 0.00 - 0.05 10*3/mm3    nRBC 0.0 0.0 - 0.2 /100 WBC   Green Top (Gel)    Collection Time: 01/22/22  7:13 AM   Result Value Ref Range    Extra Tube Hold for add-ons.    Lavender Top    Collection Time: 01/22/22  7:13 AM   Result Value Ref Range    Extra Tube hold for add-on    Gold Top - SST    Collection Time: 01/22/22  7:13 AM   Result Value Ref Range    Extra Tube Hold for add-ons.    Light Blue Top    Collection Time: 01/22/22  7:13 AM   Result Value Ref Range    Extra Tube hold for add-on        Radiology  XR Chest 1  View    Result Date: 1/22/2022  ONE VIEW PORTABLE CHEST  HISTORY: Retained food bolus.  FINDINGS: The lungs are well-expanded and clear except for a calcified granuloma in the lower right lung. There is mild cardiomegaly with a pacemaker in place and the cardiomegaly is unchanged from 09/03/2021.  This report was finalized on 1/22/2022 7:31 AM by Dr. Anupam Cohng M.D.       Medical Decision Making:  ED Course as of 01/22/22 1506   Sat Jan 22, 2022   0642 Discussed pt with Dr Acevedo. He requests cbc cmp ekg and cxr and he will come see the pt  [JS]   0724 Dr Acevedo at bedside. He states he will take the pt to the OR for EGD today, time is unknown at this point as he is awaiting from guidance from the endoscopy department.  [JS]   0724 I viewed CXR on pacs system. My findings are no cm. Implanted PM device is noted to SHANICE chest wall    [JS]   0725 Creatinine(!): 0.71 [JS]   0753 WBC: 8.07 [JS]   0753 Hemoglobin: 15.1 [JS]   0823 COVID19(!!): Detected [TR]   1015 Pt is incidentally positive for covid 19 infection. Dr Acevedo requests consultation from hospitalist group for possible admission.  [JS]   1015 Discussed pt with Marlene TROTTER for [facility] who agrees to admit the pt to Dr Del Toro  [JS]      ED Course User Index  [JS] Paty Aguilera APRN  [TR] [place] Hayden MD       Plan laboratory evaluation, chest x-ray, GI consult.  He has required scopes in the past for similar episodes.    PPE: The patient wore a mask and I wore an N95 mask throughout the entire patient encounter.      The patient has a COVID HM Topic on their chart, and they are fully vaccinated.    Diagnosis  Final diagnoses:   Food impaction of esophagus, initial encounter   Nausea and vomiting in adult   Asymptomatic COVID-19 virus infection        [place]ano Hayden MD  01/22/22 1502

## 2022-01-22 NOTE — ANESTHESIA PREPROCEDURE EVALUATION
Anesthesia Evaluation     no history of anesthetic complications:  NPO Solid Status: > 8 hours  NPO Liquid Status: Waived due to emergency           Airway   Mallampati: II  TM distance: >3 FB  Dental    (+) partials    Pulmonary - normal exam   (+) sleep apnea,     ROS comment: Covid pos.  Hx pneumonia 9/21  Cardiovascular - normal exam    (+) pacemaker pacemaker interrogated unknown, hypertension, CAD, dysrhythmias Atrial Fib,       Neuro/Psych  GI/Hepatic/Renal/Endo      Musculoskeletal     Abdominal    Substance History      OB/GYN          Other                      Anesthesia Plan    ASA 3 - emergent     general   (Covid Positive)  intravenous induction     Anesthetic plan, all risks, benefits, and alternatives have been provided, discussed and informed consent has been obtained with: patient.        CODE STATUS:

## 2022-01-22 NOTE — CONSULTS
[facility]  Initial Inpatient Consult Note    Referring Provider: Artemio Bustos    Reason for Consultation: dysphagia with an esophageal foreign body.     Subjective     History of present illness:    87 y.o. male with HTN, h/o pacemaker c/o hamburger stuck in distal esophagus since yesterday at noon. He cannot swallow his spit. H/o intermittent solids dysphagia x >10 yrs. He last had to come to the ER 10 yrs ago when Dr. Drew Bowles removed a piece of meat. He has not chest or abdominal pain. No diarrhea, rectal bleeding or melena. His weight thas been stable. The patient sees Dr. Jose Luis in our office. He had a barium swallow in 10/22 that showed a small hiatal hernia only.     Past Medical History:  Past Medical History:   Diagnosis Date   • Abdominal aortic aneurysm (HCC)    • Benign essential hypertension    • Coronary atherosclerosis    • Esophageal reflux    • Hyperglycemia    • Hyperlipidemia     '   • Sick sinus syndrome (HCC)      Past Surgical History:  Past Surgical History:   Procedure Laterality Date   • CARDIAC PACEMAKER PLACEMENT     • CARDIAC PACEMAKER PLACEMENT     • COLONOSCOPY  11/05/2014    [facility]    • CORONARY ANGIOPLASTY     • CORONARY ANGIOPLASTY WITH STENT PLACEMENT     • OTHER SURGICAL HISTORY      CATARACT SURGERY   • OTHER SURGICAL HISTORY      ROTATOR CUFF REPAIR    • UPPER GASTROINTESTINAL ENDOSCOPY  11/03/2014    [facility]      Social History:   Social History     Tobacco Use   • Smoking status: Never Smoker   • Smokeless tobacco: Never Used   Substance Use Topics   • Alcohol use: Yes     Comment: BEING A SOCIAL DRINKER      Family History:  Family History   Problem Relation Age of Onset   • Leukemia Father    • Heart disease Maternal Aunt    • Diabetes Daughter        Home Meds:  (Not in a hospital admission)    Current Meds:   ondansetron, 4 mg, Intravenous, Once      Allergies:  Allergies   Allergen Reactions   • Iodine      Review of Systems  The  following systems were reviewed and negative;  constitution, ENT, respiratory, cardiovascular, musculoskeletal and neurological     Objective     Vital Signs  Temp:  [97.8 °F (36.6 °C)] 97.8 °F (36.6 °C)  Heart Rate:  [74] 74  Resp:  [16] 16  BP: (141)/(94) 141/94  Physical Exam:  General Appearance:    Alert, cooperative, in no acute distress   Head:    Normocephalic, without obvious abnormality, atraumatic   Eyes:            Lids and lashes normal, conjunctivae and sclerae normal, no   icterus   Throat:   No oral lesions, no thrush, oral mucosa moist. He is missing many of his molars.    Neck:   No adenopathy, supple, trachea midline, no thyromegaly, no   carotid bruit, no JVD   Lungs:     Clear to auscultation,respirations regular, even and                   unlabored    Heart:    Regular rhythm and normal rate, normal S1 and S2, no            murmur, no gallop, no rub, no click   Chest Wall:    No abnormalities observed   Abdomen:     Normal bowel sounds, no masses, no organomegaly, soft        nontender, nondistended, no guarding, no rebound                 tenderness   Rectal:     Deferred   Extremities:   no edema, no cyanosis, no redness   Skin:   No bleeding, bruising or rash   Lymph nodes:   No palpable adenopathy   Psychiatric:  Judgement and insight: normal   Orientation to person place and time: normal   Mood and affect: normal   Results Review:   I reviewed the patient's new clinical results.              Invalid input(s): LABALBU, PROT      No results found for: LIPASE    Radiology:  XR Chest 1 View    (Results Pending)       Assessment/Plan   Assessment:   1. Esophageal foreign body  2. H/o dysphagia in the past. Barium swallow 10/21 showed a small hiatal hernia only. He is missing many molars.      Plan:   1. EGD today to remove esophageal foreign body.    I discussed the patient's findings and my recommendations with patient, family and nursing staff.         Alfredo Acevedo M.D.  [facility]  [address] [place] [address]  Office: [phone]

## 2022-01-22 NOTE — ED NOTES
Patient was wearing a face mask throughout our encounter.  This RN wore appropriate PPE throughout the encounter.  Hand hygiene was performed before and after patient encounter.        Charlotte Valente RN  01/22/22 0521

## 2022-01-24 ENCOUNTER — DOCUMENTATION (OUTPATIENT)
Dept: INTERNAL MEDICINE | Facility: CLINIC | Age: 87
End: 2022-01-24

## 2022-01-24 NOTE — PROGRESS NOTES
Patient w/ incidental finding of COVID positive when in ER for trapped food in esophagus. He is completely asymptomatic today. He is advised to quarantine for 5 full days and to strictly adhere to mask wearing for 5 days after.   Patient to masticate more completely and to avoid food in casing (sausage, hot dog, etc). jw

## 2022-01-26 LAB
LAB AP CASE REPORT: NORMAL
LAB AP DIAGNOSIS COMMENT: NORMAL
PATH REPORT.ADDENDUM SPEC: NORMAL
PATH REPORT.FINAL DX SPEC: NORMAL
PATH REPORT.GROSS SPEC: NORMAL

## 2022-01-30 NOTE — PROGRESS NOTES
01/30/22       Tell him that path from his EGD showed inflammation of the esophagus and mild inflammation of the stomach with no evidence of helicobacter pylori lining the stomach. FAX to his PCP.   Emelyn moses

## 2022-02-01 ENCOUNTER — TELEPHONE (OUTPATIENT)
Dept: GASTROENTEROLOGY | Facility: CLINIC | Age: 87
End: 2022-02-01

## 2022-02-01 NOTE — TELEPHONE ENCOUNTER
Called pt and left  for pt to call back.     [facility]

## 2022-02-01 NOTE — TELEPHONE ENCOUNTER
----- Message from Alfredo Acevedo MD sent at 1/30/2022  4:55 PM EST -----  01/30/22       Tell him that path from his EGD showed inflammation of the esophagus and mild inflammation of the stomach with no evidence of helicobacter pylori lining the stomach. FAX to his PCP.   Emelyn moses

## 2022-03-15 ENCOUNTER — TELEPHONE (OUTPATIENT)
Dept: GASTROENTEROLOGY | Facility: CLINIC | Age: 87
End: 2022-03-15

## 2022-03-15 ENCOUNTER — OFFICE VISIT (OUTPATIENT)
Dept: GASTROENTEROLOGY | Facility: CLINIC | Age: 87
End: 2022-03-15

## 2022-03-15 VITALS — WEIGHT: 239.8 LBS | HEIGHT: 71 IN | BODY MASS INDEX: 33.57 KG/M2 | TEMPERATURE: 98 F

## 2022-03-15 DIAGNOSIS — R13.19 ESOPHAGEAL DYSPHAGIA: ICD-10-CM

## 2022-03-15 DIAGNOSIS — K21.9 GASTROESOPHAGEAL REFLUX DISEASE, UNSPECIFIED WHETHER ESOPHAGITIS PRESENT: Primary | ICD-10-CM

## 2022-03-15 PROCEDURE — 99213 OFFICE O/P EST LOW 20 MIN: CPT | Performed by: INTERNAL MEDICINE

## 2022-03-15 NOTE — TELEPHONE ENCOUNTER
MARI Swain for EGD on 04/22/2022  arrive at 12pm  . Gave Prep instructions in office.    Advised PT  that  will call with final arrival time  24 hrs before procedure. If they do not get a phone call, arrival time will stay the same as given on instructions

## 2022-03-15 NOTE — PROGRESS NOTES
Chief Complaint   Patient presents with   • Difficulty Swallowing       Scar Pérez is a  87 y.o. male here for a follow up visit for dysphagia.    HPI     Patient 87-year-old male with history of hyperlipidemia, hyperglycemia and hypertension presenting with recurrent dysphagia.  Patient seen initially with above complaints underwent esophagram which was read as unremarkable but in January was brought to the ER with a food bolus.  Endoscopically the food was stuck at the distal esophagus but no abnormal mucosa was noted.  Food was passed through the GE junction and no other findings were made biopsies suggested reflux esophagitis otherwise negative continues to have episodic dysphagia to solids.    Past Medical History:   Diagnosis Date   • Abdominal aortic aneurysm (HCC)    • Benign essential hypertension    • Coronary atherosclerosis    • Esophageal reflux    • Hyperglycemia    • Hyperlipidemia     '   • Sick sinus syndrome (HCC)          Current Outpatient Medications:   •  aspirin 325 MG tablet, Take 1 tablet by mouth daily., Disp: , Rfl:   •  atorvastatin (LIPITOR) 20 MG tablet, Take 20 mg by mouth daily., Disp: , Rfl:   •  Cholecalciferol (VITAMIN D3) 5000 UNITS tablet, Take by mouth., Disp: , Rfl:   •  Cyanocobalamin (B-12) 1000 MCG capsule, Take 1 capsule by mouth., Disp: , Rfl:   •  hydroCHLOROthiazide (MICROZIDE) 12.5 MG capsule, Take 1 capsule by mouth Daily., Disp: 90 capsule, Rfl: 2  •  hydrocortisone (ANUSOL-HC) 25 MG suppository, Hydrocortisone Acetate 25 MG Rectal Suppository; Patient Sig: Hydrocortisone Acetate 25 MG Rectal Suppository INSERT ONE SUPPOSITORY RECTALLY TWICE DAILY AS NEEDED; 12; 4; 19-Nov-2013; Active, Disp: , Rfl:   •  metoprolol tartrate (Lopressor) 50 MG tablet, Take 1 tablet by mouth 2 (Two) Times a Day., Disp: 60 tablet, Rfl: 4  •  niacin 500 MG tablet, Take 1 tablet by mouth daily., Disp: , Rfl:   •  nitroglycerin (NITROSTAT) 0.4 MG SL tablet, Place 0.4 mg under the  tongue., Disp: , Rfl:   •  oxyCODONE-acetaminophen (PERCOCET) 5-325 MG per tablet, Take 1 tablet by mouth Every 6 (Six) Hours As Needed for Severe Pain . (Patient taking differently: Take 1 tablet by mouth As Needed for Severe Pain .), Disp: 15 tablet, Rfl: 0  •  pantoprazole (PROTONIX) 40 MG EC tablet, Take 1 tablet by mouth 2 (Two) Times a Day., Disp: 180 tablet, Rfl: 3  •  trospium (SANCTURA) 20 MG tablet, Take 20 mg by mouth 2 (Two) Times a Day., Disp: , Rfl:   •  valsartan (Diovan) 80 MG tablet, Take 1 tablet by mouth Daily. (Patient taking differently: Take 40 mg by mouth Daily.), Disp: 30 tablet, Rfl: 3  •  docusate sodium (COLACE) 100 MG capsule, Take 1 capsule by mouth 2 (Two) Times a Day., Disp: 10 capsule, Rfl: 0  •  saccharomyces boulardii (Florastor) 250 MG capsule, Take 1 capsule by mouth 2 (Two) Times a Day., Disp: 20 capsule, Rfl: 0    Allergies   Allergen Reactions   • Iodine        Social History     Socioeconomic History   • Marital status:    Tobacco Use   • Smoking status: Never Smoker   • Smokeless tobacco: Never Used   Substance and Sexual Activity   • Alcohol use: Yes     Comment: BEING A SOCIAL DRINKER   • Drug use: No       Family History   Problem Relation Age of Onset   • Leukemia Father    • Heart disease Maternal Aunt    • Diabetes Daughter        Review of Systems   Constitutional: Negative.    HENT: Positive for trouble swallowing. Negative for sore throat and voice change.    Respiratory: Negative.    Cardiovascular: Negative.    Gastrointestinal: Negative.    Musculoskeletal: Negative.    Skin: Negative.    Hematological: Negative.        Vitals:    03/15/22 1349   Temp: 98 °F (36.7 °C)       Physical Exam  Vitals reviewed.   Constitutional:       Appearance: Normal appearance. He is well-developed. He is obese.   HENT:      Head: Normocephalic and atraumatic.   Eyes:      General: No scleral icterus.     Pupils: Pupils are equal, round, and reactive to light.    Cardiovascular:      Rate and Rhythm: Normal rate and regular rhythm.      Heart sounds: Normal heart sounds.   Pulmonary:      Effort: Pulmonary effort is normal. No respiratory distress.      Breath sounds: Normal breath sounds. No wheezing or rales.   Abdominal:      General: Bowel sounds are normal. There is no distension.      Palpations: Abdomen is soft. There is no mass.      Tenderness: There is no abdominal tenderness.      Hernia: No hernia is present.   Skin:     General: Skin is warm and dry.      Coloration: Skin is not jaundiced.      Findings: No rash.   Neurological:      General: No focal deficit present.      Mental Status: He is alert and oriented to person, place, and time.      Cranial Nerves: No cranial nerve deficit.   Psychiatric:         Behavior: Behavior normal.         Thought Content: Thought content normal.         Judgment: Judgment normal.         Admission on 01/22/2022, Discharged on 01/22/2022   Component Date Value Ref Range Status   • COVID19 01/22/2022 Detected (A) Not Detected - Ref. Range Final   • Glucose 01/22/2022 106 (A) 65 - 99 mg/dL Final   • BUN 01/22/2022 18  8 - 23 mg/dL Final   • Creatinine 01/22/2022 0.71 (A) 0.76 - 1.27 mg/dL Final   • Sodium 01/22/2022 130 (A) 136 - 145 mmol/L Final   • Potassium 01/22/2022 3.9  3.5 - 5.2 mmol/L Final   • Chloride 01/22/2022 95 (A) 98 - 107 mmol/L Final   • CO2 01/22/2022 26.8  22.0 - 29.0 mmol/L Final   • Calcium 01/22/2022 8.8  8.6 - 10.5 mg/dL Final   • Total Protein 01/22/2022 6.4  6.0 - 8.5 g/dL Final   • Albumin 01/22/2022 3.90  3.50 - 5.20 g/dL Final   • ALT (SGPT) 01/22/2022 21  1 - 41 U/L Final   • AST (SGOT) 01/22/2022 16  1 - 40 U/L Final   • Alkaline Phosphatase 01/22/2022 76  39 - 117 U/L Final   • Total Bilirubin 01/22/2022 1.1  0.0 - 1.2 mg/dL Final   • eGFR Non African Amer 01/22/2022 105  >60 mL/min/1.73 Final   • Globulin 01/22/2022 2.5  gm/dL Final   • A/G Ratio 01/22/2022 1.6  g/dL Final   • BUN/Creatinine  Ratio 01/22/2022 25.4 (A) 7.0 - 25.0 Final   • Anion Gap 01/22/2022 8.2  5.0 - 15.0 mmol/L Final   • WBC 01/22/2022 8.07  3.40 - 10.80 10*3/mm3 Final   • RBC 01/22/2022 4.94  4.14 - 5.80 10*6/mm3 Final   • Hemoglobin 01/22/2022 15.1  13.0 - 17.7 g/dL Final   • Hematocrit 01/22/2022 44.4  37.5 - 51.0 % Final   • MCV 01/22/2022 89.9  79.0 - 97.0 fL Final   • MCH 01/22/2022 30.6  26.6 - 33.0 pg Final   • MCHC 01/22/2022 34.0  31.5 - 35.7 g/dL Final   • RDW 01/22/2022 11.9 (A) 12.3 - 15.4 % Final   • RDW-SD 01/22/2022 38.8  37.0 - 54.0 fl Final   • MPV 01/22/2022 8.6  6.0 - 12.0 fL Final   • Platelets 01/22/2022 212  140 - 450 10*3/mm3 Final   • Neutrophil % 01/22/2022 67.9  42.7 - 76.0 % Final   • Lymphocyte % 01/22/2022 15.2 (A) 19.6 - 45.3 % Final   • Monocyte % 01/22/2022 11.2  5.0 - 12.0 % Final   • Eosinophil % 01/22/2022 4.7  0.3 - 6.2 % Final   • Basophil % 01/22/2022 0.5  0.0 - 1.5 % Final   • Immature Grans % 01/22/2022 0.5  0.0 - 0.5 % Final   • Neutrophils, Absolute 01/22/2022 5.48  1.70 - 7.00 10*3/mm3 Final   • Lymphocytes, Absolute 01/22/2022 1.23  0.70 - 3.10 10*3/mm3 Final   • Monocytes, Absolute 01/22/2022 0.90  0.10 - 0.90 10*3/mm3 Final   • Eosinophils, Absolute 01/22/2022 0.38  0.00 - 0.40 10*3/mm3 Final   • Basophils, Absolute 01/22/2022 0.04  0.00 - 0.20 10*3/mm3 Final   • Immature Grans, Absolute 01/22/2022 0.04  0.00 - 0.05 10*3/mm3 Final   • nRBC 01/22/2022 0.0  0.0 - 0.2 /100 WBC Final   • Extra Tube 01/22/2022 Hold for add-ons.   Final   • Extra Tube 01/22/2022 hold for add-on   Final   • Extra Tube 01/22/2022 Hold for add-ons.   Final   • Extra Tube 01/22/2022 hold for add-on   Final   • QT Interval 01/22/2022 469  ms Final   • Addendum 01/22/2022    Final                    Value:This result contains rich text formatting which cannot be displayed here.   • Case Report 01/22/2022    Final                    Value:Surgical Pathology Report                         Case: [id number]                                   Authorizing Provider:  Alfredo Acevedo MD           Collected:           01/22/2022 11:34 AM          Ordering Location:     [facility]  Received:            01/24/2022 06:59 AM                                 MAIN OR                                                                      Pathologist:           Daiana Barnhart MD                                                          Specimens:   1) - Stomach, random gastric biopsy                                                                 2) - GE Junction, ge junction biopsy                                                                3) - Esophagus, proximal esophagus                                                        • Final Diagnosis 01/22/2022    Final                    Value:This result contains rich text formatting which cannot be displayed here.   • Comment 01/22/2022    Final                    Value:This result contains rich text formatting which cannot be displayed here.   • Gross Description 01/22/2022    Final                    Value:This result contains rich text formatting which cannot be displayed here.       Diagnoses and all orders for this visit:    1. Gastroesophageal reflux disease, unspecified whether esophagitis present (Primary)  -     Case Request; Standing  -     Case Request    2. Esophageal dysphagia  -     Case Request; Standing  -     Case Request      Patient 87-year-old male with history of esophageal dysphagia and reflux status post food bolus in January.  Patient apparently eating hamburger noted with food impacted in the distal esophagus on EGD.  Dilation not performed at the time here for follow-up.  Patient so far managing by eating soft foods with lots of chewing.  Patient denies any nausea vomiting no heartburn on current medicine just the swallowing issues.  Will arrange EGD for dilation and follow clinical response.

## 2022-04-05 DIAGNOSIS — E78.00 PURE HYPERCHOLESTEROLEMIA: ICD-10-CM

## 2022-04-05 DIAGNOSIS — I10 BENIGN ESSENTIAL HYPERTENSION: Primary | ICD-10-CM

## 2022-04-05 DIAGNOSIS — E87.5 HYPERKALEMIA: ICD-10-CM

## 2022-04-05 DIAGNOSIS — Z12.5 SCREENING FOR PROSTATE CANCER: ICD-10-CM

## 2022-04-12 LAB
ALBUMIN SERPL-MCNC: 4 G/DL (ref 3.6–4.6)
ALBUMIN/GLOB SERPL: 1.5 {RATIO} (ref 1.2–2.2)
ALP SERPL-CCNC: 72 IU/L (ref 44–121)
ALT SERPL-CCNC: 18 IU/L (ref 0–44)
APPEARANCE UR: CLEAR
AST SERPL-CCNC: 20 IU/L (ref 0–40)
BACTERIA #/AREA URNS HPF: NORMAL /[HPF]
BASOPHILS # BLD AUTO: 0 X10E3/UL (ref 0–0.2)
BASOPHILS NFR BLD AUTO: 1 %
BILIRUB SERPL-MCNC: 0.9 MG/DL (ref 0–1.2)
BILIRUB UR QL STRIP: NEGATIVE
BUN SERPL-MCNC: 17 MG/DL (ref 8–27)
BUN/CREAT SERPL: 20 (ref 10–24)
CALCIUM SERPL-MCNC: 9.3 MG/DL (ref 8.6–10.2)
CASTS URNS QL MICRO: NORMAL /LPF
CHLORIDE SERPL-SCNC: 97 MMOL/L (ref 96–106)
CHOLEST SERPL-MCNC: 122 MG/DL (ref 100–199)
CO2 SERPL-SCNC: 24 MMOL/L (ref 20–29)
COLOR UR: YELLOW
CREAT SERPL-MCNC: 0.84 MG/DL (ref 0.76–1.27)
EGFRCR SERPLBLD CKD-EPI 2021: 84 ML/MIN/1.73
EOSINOPHIL # BLD AUTO: 0.3 X10E3/UL (ref 0–0.4)
EOSINOPHIL NFR BLD AUTO: 5 %
EPI CELLS #/AREA URNS HPF: NORMAL /HPF (ref 0–10)
ERYTHROCYTE [DISTWIDTH] IN BLOOD BY AUTOMATED COUNT: 13.2 % (ref 11.6–15.4)
GLOBULIN SER CALC-MCNC: 2.7 G/DL (ref 1.5–4.5)
GLUCOSE SERPL-MCNC: 94 MG/DL (ref 65–99)
GLUCOSE UR QL STRIP: NEGATIVE
HCT VFR BLD AUTO: 43.8 % (ref 37.5–51)
HDLC SERPL-MCNC: 37 MG/DL
HGB BLD-MCNC: 14.8 G/DL (ref 13–17.7)
HGB UR QL STRIP: NEGATIVE
IMM GRANULOCYTES # BLD AUTO: 0 X10E3/UL (ref 0–0.1)
IMM GRANULOCYTES NFR BLD AUTO: 0 %
KETONES UR QL STRIP: NEGATIVE
LDLC SERPL CALC-MCNC: 72 MG/DL (ref 0–99)
LEUKOCYTE ESTERASE UR QL STRIP: NEGATIVE
LYMPHOCYTES # BLD AUTO: 1.1 X10E3/UL (ref 0.7–3.1)
LYMPHOCYTES NFR BLD AUTO: 19 %
MCH RBC QN AUTO: 31 PG (ref 26.6–33)
MCHC RBC AUTO-ENTMCNC: 33.8 G/DL (ref 31.5–35.7)
MCV RBC AUTO: 92 FL (ref 79–97)
MICRO URNS: NORMAL
MICRO URNS: NORMAL
MONOCYTES # BLD AUTO: 0.6 X10E3/UL (ref 0.1–0.9)
MONOCYTES NFR BLD AUTO: 11 %
NEUTROPHILS # BLD AUTO: 3.5 X10E3/UL (ref 1.4–7)
NEUTROPHILS NFR BLD AUTO: 64 %
NITRITE UR QL STRIP: NEGATIVE
PH UR STRIP: 7 [PH] (ref 5–7.5)
PLATELET # BLD AUTO: 198 X10E3/UL (ref 150–450)
POTASSIUM SERPL-SCNC: 4.5 MMOL/L (ref 3.5–5.2)
PROT SERPL-MCNC: 6.7 G/DL (ref 6–8.5)
PROT UR QL STRIP: NEGATIVE
PSA SERPL-MCNC: 1.8 NG/ML (ref 0–4)
RBC # BLD AUTO: 4.78 X10E6/UL (ref 4.14–5.8)
RBC #/AREA URNS HPF: NORMAL /HPF (ref 0–2)
SODIUM SERPL-SCNC: 134 MMOL/L (ref 134–144)
SP GR UR STRIP: 1.02 (ref 1–1.03)
TRIGL SERPL-MCNC: 57 MG/DL (ref 0–149)
TSH SERPL DL<=0.005 MIU/L-ACNC: 2.18 UIU/ML (ref 0.45–4.5)
URINALYSIS REFLEX: NORMAL
UROBILINOGEN UR STRIP-MCNC: 1 MG/DL (ref 0.2–1)
VLDLC SERPL CALC-MCNC: 13 MG/DL (ref 5–40)
WBC # BLD AUTO: 5.5 X10E3/UL (ref 3.4–10.8)
WBC #/AREA URNS HPF: NORMAL /HPF (ref 0–5)

## 2022-04-18 ENCOUNTER — OFFICE VISIT (OUTPATIENT)
Dept: INTERNAL MEDICINE | Facility: CLINIC | Age: 87
End: 2022-04-18

## 2022-04-18 ENCOUNTER — TRANSCRIBE ORDERS (OUTPATIENT)
Dept: ADMINISTRATIVE | Facility: HOSPITAL | Age: 87
End: 2022-04-18

## 2022-04-18 VITALS
HEIGHT: 71 IN | SYSTOLIC BLOOD PRESSURE: 138 MMHG | HEART RATE: 63 BPM | BODY MASS INDEX: 32.9 KG/M2 | DIASTOLIC BLOOD PRESSURE: 78 MMHG | WEIGHT: 235 LBS

## 2022-04-18 DIAGNOSIS — Z01.818 OTHER SPECIFIED PRE-OPERATIVE EXAMINATION: Primary | ICD-10-CM

## 2022-04-18 DIAGNOSIS — E78.00 PURE HYPERCHOLESTEROLEMIA: ICD-10-CM

## 2022-04-18 DIAGNOSIS — I10 BENIGN ESSENTIAL HYPERTENSION: Primary | ICD-10-CM

## 2022-04-18 DIAGNOSIS — Z00.00 HEALTHCARE MAINTENANCE: ICD-10-CM

## 2022-04-18 PROCEDURE — 1159F MED LIST DOCD IN RCRD: CPT | Performed by: INTERNAL MEDICINE

## 2022-04-18 PROCEDURE — G0439 PPPS, SUBSEQ VISIT: HCPCS | Performed by: INTERNAL MEDICINE

## 2022-04-18 PROCEDURE — 1170F FXNL STATUS ASSESSED: CPT | Performed by: INTERNAL MEDICINE

## 2022-04-18 PROCEDURE — 99213 OFFICE O/P EST LOW 20 MIN: CPT | Performed by: INTERNAL MEDICINE

## 2022-04-18 NOTE — PROGRESS NOTES
The ABCs of the Annual Wellness Visit  Subsequent Medicare Wellness Visit    Chief Complaint   Patient presents with   • Annual Exam   • Hypertension   • Hyperlipidemia   • GI Problem      Subjective    History of Present Illness:  Scar Pérez is a 87 y.o. male who presents for a Subsequent Medicare Wellness Visit, to review chronic issues, and to discuss acute needs. Patient has htn. He notes that bp is 120-140/60-80s on valsartan, hctz, and metoprolol. He is in general able to maintain physcial activity but limits based on weather.   [facility]. Current on vaccinations. He follows w/ dermatology and no worrisome lesions. Most recent hearing 2 years ago. Vision 1/22.   Cbc, cmp, lipid, tsh, u/a, psa all reviewed w/ patient and these labs are at goal level.   Has kenan w/ hiatal hernia. Minimal dysphagia after esophageal dilation. H/o aspiration pneumonia. He is using caution w/ eating.     The following portions of the patient's history were reviewed and   updated as appropriate: allergies, current medications, past family history, past medical history, past social history, past surgical history and problem list.    Compared to one year ago, the patient feels his physical   health is the same.    Compared to one year ago, the patient feels his mental   health is the same.    Recent Hospitalizations:  This patient has had a [facility] admission record on file within the last 365 days.    Current Medical Providers:  Patient Care Team:  Marcelle Fields MD as PCP - General (Internal Medicine)  Sami Fay MD as Consulting Physician (Orthopedic Surgery)  Jeannie Palomo MD (Dermatology)  Yomi Delgadillo DPM as Consulting Physician (Podiatry)  Devon Florez MD (Vascular Surgery)  Dorian Connor MD as Consulting Physician (Cardiology)    Outpatient Medications Prior to Visit   Medication Sig Dispense Refill   • aspirin 325 MG tablet  Take 1 tablet by mouth daily.     • atorvastatin (LIPITOR) 20 MG tablet Take 20 mg by mouth daily.     • Cholecalciferol (VITAMIN D3) 5000 UNITS tablet Take by mouth.     • Cyanocobalamin (B-12) 1000 MCG capsule Take 1 capsule by mouth.     • hydroCHLOROthiazide (MICROZIDE) 12.5 MG capsule Take 1 capsule by mouth Daily. 90 capsule 2   • metoprolol tartrate (Lopressor) 50 MG tablet Take 1 tablet by mouth 2 (Two) Times a Day. 60 tablet 4   • niacin 500 MG tablet Take 1 tablet by mouth daily.     • nitroglycerin (NITROSTAT) 0.4 MG SL tablet Place 0.4 mg under the tongue.     • pantoprazole (PROTONIX) 40 MG EC tablet Take 1 tablet by mouth 2 (Two) Times a Day. 180 tablet 3   • trospium (SANCTURA) 20 MG tablet Take 20 mg by mouth 2 (Two) Times a Day.     • valsartan (Diovan) 80 MG tablet Take 1 tablet by mouth Daily. (Patient taking differently: Take 40 mg by mouth Daily.) 30 tablet 3   • docusate sodium (COLACE) 100 MG capsule Take 1 capsule by mouth 2 (Two) Times a Day. 10 capsule 0   • hydrocortisone (ANUSOL-HC) 25 MG suppository Hydrocortisone Acetate 25 MG Rectal Suppository; Patient Sig: Hydrocortisone Acetate 25 MG Rectal Suppository INSERT ONE SUPPOSITORY RECTALLY TWICE DAILY AS NEEDED; 12; 4; 19-Nov-2013; Active     • oxyCODONE-acetaminophen (PERCOCET) 5-325 MG per tablet Take 1 tablet by mouth Every 6 (Six) Hours As Needed for Severe Pain . (Patient taking differently: Take 1 tablet by mouth As Needed for Severe Pain .) 15 tablet 0   • saccharomyces boulardii (Florastor) 250 MG capsule Take 1 capsule by mouth 2 (Two) Times a Day. 20 capsule 0     No facility-administered medications prior to visit.       No opioid medication identified on active medication list. I have reviewed chart for other potential  high risk medication/s and harmful drug interactions in the elderly.          Aspirin is on active medication list. Aspirin use is indicated based on review of current medical condition/s. Pros and cons of this  "therapy have been discussed today. Benefits of this medication outweigh potential harm.  Patient has been encouraged to continue taking this medication.  .      Patient Active Problem List   Diagnosis   • Absolute anemia   • A-fib (HCC)   • Arteriosclerosis of coronary artery   • Bleeding gastrointestinal   • Lipoma of skin and subcutaneous tissue   • Obstructive apnea   • Hyperkalemia   • Benign essential hypertension   • Pure hypercholesterolemia   • Impaired fasting glucose   • Right upper lobe pneumonia   • Hypopotassemia   • Gastroesophageal reflux disease   • Esophageal dysphagia     Advance Care Planning  Advance Directive is not on file.  ACP discussion was held with the patient during this visit. Patient has an advance directive (not in EMR), copy requested.          Objective    Vitals:    04/18/22 0907 04/18/22 0939   BP: 144/68 138/78   Pulse: 63    Weight: 107 kg (235 lb)    Height: 180.3 cm (71\")      BMI Readings from Last 1 Encounters:   04/18/22 32.78 kg/m²   BMI is above normal parameters. Recommendations include: exercise counseling and nutrition counseling    Does the patient have evidence of cognitive impairment? No    Physical Exam  Vitals and nursing note reviewed.   Constitutional:       Appearance: Normal appearance. He is well-developed.   HENT:      Head: Normocephalic and atraumatic.      Right Ear: Tympanic membrane and external ear normal.      Left Ear: Tympanic membrane and external ear normal.      Nose: Nose normal.      Mouth/Throat:      Mouth: Mucous membranes are moist.   Eyes:      Extraocular Movements: Extraocular movements intact.      Pupils: Pupils are equal, round, and reactive to light.   Cardiovascular:      Rate and Rhythm: Normal rate and regular rhythm.      Pulses: Normal pulses.      Heart sounds: Normal heart sounds.   Pulmonary:      Effort: Pulmonary effort is normal. No respiratory distress.      Breath sounds: Normal breath sounds.   Abdominal:      General: " Abdomen is flat. Bowel sounds are normal.      Palpations: Abdomen is soft.   Musculoskeletal:         General: Normal range of motion.      Cervical back: Normal range of motion and neck supple.   Skin:     General: Skin is warm and dry.   Neurological:      General: No focal deficit present.      Mental Status: He is alert and oriented to person, place, and time.   Psychiatric:         Mood and Affect: Mood normal.         Behavior: Behavior normal.         Thought Content: Thought content normal.         Judgment: Judgment normal.       Lab Results   Component Value Date    CHLPL 122 04/11/2022    TRIG 57 04/11/2022    HDL 37 (L) 04/11/2022    LDL 72 04/11/2022    VLDL 13 04/11/2022            HEALTH RISK ASSESSMENT    Smoking Status:  Social History     Tobacco Use   Smoking Status Never Smoker   Smokeless Tobacco Never Used     Alcohol Consumption:  Social History     Substance and Sexual Activity   Alcohol Use Yes    Comment: BEING A SOCIAL DRINKER     Fall Risk Screen:    STEADI Fall Risk Assessment was completed, and patient is at LOW risk for falls.Assessment completed on:4/18/2022    Depression Screening:  PHQ-2/PHQ-9 Depression Screening 4/18/2022   Retired PHQ-9 Total Score -   Retired Total Score -   Little Interest or Pleasure in Doing Things 0-->not at all   Feeling Down, Depressed or Hopeless 0-->not at all   PHQ-9: Brief Depression Severity Measure Score 0       Health Habits and Functional and Cognitive Screening:  Functional & Cognitive Status 4/18/2022   Do you have difficulty preparing food and eating? No   Do you have difficulty bathing yourself, getting dressed or grooming yourself? No   Do you have difficulty using the toilet? No   Do you have difficulty moving around from place to place? No   Do you have trouble with steps or getting out of a bed or a chair? No   Current Diet Unhealthy Diet   Dental Exam Up to date   Eye Exam Up to date   Exercise (times per week) -   Do you need help using  the phone?  No   Are you deaf or do you have serious difficulty hearing?  No   Do you need help with transportation? No   Do you need help shopping? No   Do you need help preparing meals?  No   Do you need help with housework?  No   Do you need help with laundry? No   Do you need help taking your medications? No   Do you need help managing money? No   Do you ever drive or ride in a car without wearing a seat belt? No   Have you felt unusual stress, anger or loneliness in the last month? No   Who do you live with? Spouse   If you need help, do you have trouble finding someone available to you? No   Have you been bothered in the last four weeks by sexual problems? No   Do you have difficulty concentrating, remembering or making decisions? No       Age-appropriate Screening Schedule:  Refer to the list below for future screening recommendations based on patient's age, sex and/or medical conditions. Orders for these recommended tests are listed in the plan section. The patient has been provided with a written plan.    Health Maintenance   Topic Date Due   • INFLUENZA VACCINE  08/01/2022   • LIPID PANEL  04/11/2023   • TDAP/TD VACCINES (5 - Td or Tdap) 07/31/2027   • ZOSTER VACCINE  Completed              Assessment/Plan   CMS Preventative Services Quick Reference  Risk Factors Identified During Encounter  Cardiovascular Disease  Inactivity/Sedentary  Obesity/Overweight   The above risks/problems have been discussed with the patient.  Follow up actions/plans if indicated are seen below in the Assessment/Plan Section.  Pertinent information has been shared with the patient in the After Visit Summary.  Gave lit on vascular screening. He is to complete and return a living will. He is to get hearing testing and possibly aid device. F/u routinely w/ all specialists and w all hcm. Advised increased physcial activity and dietary modifications w/ low sodium, glucose and fat in diet. Small bites and caution given h/o dysphagia.  He will f/u in 6 months or prn.     Diagnoses and all orders for this visit:    1. Benign essential hypertension (Primary)    2. Healthcare maintenance    3. Pure hypercholesterolemia        Follow Up:   Return in about 6 months (around 10/18/2022) for Recheck.     An After Visit Summary and PPPS were made available to the patient.        I spent 50 minutes caring for Scar on this date of service. This time includes time spent by me in the following activities:preparing for the visit, reviewing tests, obtaining and/or reviewing a separately obtained history, performing a medically appropriate examination and/or evaluation , counseling and educating the patient/family/caregiver, ordering medications, tests, or procedures, referring and communicating with other health care professionals , documenting information in the medical record and independently interpreting results and communicating that information with the patient/family/caregiver

## 2022-04-19 ENCOUNTER — TELEPHONE (OUTPATIENT)
Dept: GASTROENTEROLOGY | Facility: CLINIC | Age: 87
End: 2022-04-19

## 2022-04-19 DIAGNOSIS — Z01.818 PRE-OP TESTING: Primary | ICD-10-CM

## 2022-04-20 ENCOUNTER — LAB (OUTPATIENT)
Dept: LAB | Facility: HOSPITAL | Age: 87
End: 2022-04-20

## 2022-04-20 DIAGNOSIS — Z01.818 OTHER SPECIFIED PRE-OPERATIVE EXAMINATION: ICD-10-CM

## 2022-04-20 LAB — SARS-COV-2 ORF1AB RESP QL NAA+PROBE: NOT DETECTED

## 2022-04-20 PROCEDURE — C9803 HOPD COVID-19 SPEC COLLECT: HCPCS

## 2022-04-20 PROCEDURE — U0004 COV-19 TEST NON-CDC HGH THRU: HCPCS

## 2022-04-20 PROCEDURE — U0005 INFEC AGEN DETEC AMPLI PROBE: HCPCS

## 2022-04-22 ENCOUNTER — HOSPITAL ENCOUNTER (OUTPATIENT)
Facility: HOSPITAL | Age: 87
Setting detail: HOSPITAL OUTPATIENT SURGERY
Discharge: HOME | End: 2022-04-22
Attending: INTERNAL MEDICINE | Admitting: INTERNAL MEDICINE

## 2022-04-22 ENCOUNTER — ANESTHESIA (OUTPATIENT)
Dept: GASTROENTEROLOGY | Facility: HOSPITAL | Age: 87
End: 2022-04-22

## 2022-04-22 ENCOUNTER — ANESTHESIA EVENT (OUTPATIENT)
Dept: GASTROENTEROLOGY | Facility: HOSPITAL | Age: 87
End: 2022-04-22

## 2022-04-22 VITALS
BODY MASS INDEX: 33.53 KG/M2 | OXYGEN SATURATION: 95 % | HEART RATE: 60 BPM | DIASTOLIC BLOOD PRESSURE: 80 MMHG | RESPIRATION RATE: 16 BRPM | SYSTOLIC BLOOD PRESSURE: 140 MMHG | HEIGHT: 71 IN | WEIGHT: 239.5 LBS

## 2022-04-22 DIAGNOSIS — R13.19 ESOPHAGEAL DYSPHAGIA: ICD-10-CM

## 2022-04-22 DIAGNOSIS — K21.9 GASTROESOPHAGEAL REFLUX DISEASE, UNSPECIFIED WHETHER ESOPHAGITIS PRESENT: ICD-10-CM

## 2022-04-22 PROCEDURE — C1726 CATH, BAL DIL, NON-VASCULAR: HCPCS | Performed by: INTERNAL MEDICINE

## 2022-04-22 PROCEDURE — S0260 H&P FOR SURGERY: HCPCS | Performed by: INTERNAL MEDICINE

## 2022-04-22 PROCEDURE — 25010000002 PROPOFOL 10 MG/ML EMULSION: Performed by: ANESTHESIOLOGY

## 2022-04-22 PROCEDURE — 43249 ESOPH EGD DILATION <30 MM: CPT | Performed by: INTERNAL MEDICINE

## 2022-04-22 RX ORDER — PROPOFOL 10 MG/ML
VIAL (ML) INTRAVENOUS AS NEEDED
Status: DISCONTINUED | OUTPATIENT
Start: 2022-04-22 | End: 2022-04-22 | Stop reason: SURG

## 2022-04-22 RX ORDER — SODIUM CHLORIDE, SODIUM LACTATE, POTASSIUM CHLORIDE, CALCIUM CHLORIDE 600; 310; 30; 20 MG/100ML; MG/100ML; MG/100ML; MG/100ML
30 INJECTION, SOLUTION INTRAVENOUS CONTINUOUS PRN
Status: DISCONTINUED | OUTPATIENT
Start: 2022-04-22 | End: 2022-04-22 | Stop reason: HOSPADM

## 2022-04-22 RX ORDER — LIDOCAINE HYDROCHLORIDE 20 MG/ML
INJECTION, SOLUTION INFILTRATION; PERINEURAL AS NEEDED
Status: DISCONTINUED | OUTPATIENT
Start: 2022-04-22 | End: 2022-04-22 | Stop reason: SURG

## 2022-04-22 RX ORDER — SODIUM CHLORIDE 0.9 % (FLUSH) 0.9 %
10 SYRINGE (ML) INJECTION AS NEEDED
Status: DISCONTINUED | OUTPATIENT
Start: 2022-04-22 | End: 2022-04-22 | Stop reason: HOSPADM

## 2022-04-22 RX ORDER — GLYCOPYRROLATE 0.2 MG/ML
INJECTION INTRAMUSCULAR; INTRAVENOUS AS NEEDED
Status: DISCONTINUED | OUTPATIENT
Start: 2022-04-22 | End: 2022-04-22 | Stop reason: SURG

## 2022-04-22 RX ORDER — SODIUM CHLORIDE 0.9 % (FLUSH) 0.9 %
10 SYRINGE (ML) INJECTION EVERY 12 HOURS SCHEDULED
Status: DISCONTINUED | OUTPATIENT
Start: 2022-04-22 | End: 2022-04-22 | Stop reason: HOSPADM

## 2022-04-22 RX ORDER — PROPOFOL 10 MG/ML
VIAL (ML) INTRAVENOUS CONTINUOUS PRN
Status: DISCONTINUED | OUTPATIENT
Start: 2022-04-22 | End: 2022-04-22 | Stop reason: SURG

## 2022-04-22 RX ADMIN — Medication 120 MCG/KG/MIN: at 13:14

## 2022-04-22 RX ADMIN — PROPOFOL 100 MG: 10 INJECTION, EMULSION INTRAVENOUS at 13:14

## 2022-04-22 RX ADMIN — SODIUM CHLORIDE, POTASSIUM CHLORIDE, SODIUM LACTATE AND CALCIUM CHLORIDE 30 ML/HR: 600; 310; 30; 20 INJECTION, SOLUTION INTRAVENOUS at 12:24

## 2022-04-22 RX ADMIN — GLYCOPYRROLATE 0.2 MG: 0.2 INJECTION INTRAMUSCULAR; INTRAVENOUS at 13:14

## 2022-04-22 RX ADMIN — LIDOCAINE HYDROCHLORIDE 40 MG: 20 INJECTION, SOLUTION INFILTRATION; PERINEURAL at 13:14

## 2022-04-22 NOTE — ANESTHESIA POSTPROCEDURE EVALUATION
Patient: Scar Pérez    Procedure Summary     Date: 04/22/22 Room / [facility]:  [facility]    Anesthesia Start: 1307 Anesthesia Stop: 1324    Procedure: ESOPHAGOGASTRODUODENOSCOPY with ballon dialation 15 and 18 (N/A Esophagus) Diagnosis:       Gastroesophageal reflux disease, unspecified whether esophagitis present      Esophageal dysphagia      Schatzki's ring      Hiatal hernia      (Gastroesophageal reflux disease, unspecified whether esophagitis present [K21.9])      (Esophageal dysphagia [R13.19])    Surgeons: Shimon Luis MD Provider: Zeke Haywood MD    Anesthesia Type: MAC ASA Status: 3          Anesthesia Type: MAC    Vitals  No vitals data found for the desired time range.          Post Anesthesia Care and Evaluation    Patient location during evaluation: bedside  Patient participation: complete - patient participated  Level of consciousness: responsive to light touch, responsive to verbal stimuli and sleepy but conscious  Pain score: 0  Pain management: adequate  Airway patency: patent  Anesthetic complications: No anesthetic complications  PONV Status: none  Cardiovascular status: acceptable and hemodynamically stable  Respiratory status: acceptable  Hydration status: acceptable

## 2022-04-22 NOTE — ANESTHESIA PREPROCEDURE EVALUATION
Anesthesia Evaluation     Patient summary reviewed and Nursing notes reviewed   no history of anesthetic complications:  NPO Solid Status: > 8 hours  NPO Liquid Status: > 2 hours           Airway   Mallampati: II  TM distance: >3 FB  Neck ROM: full  no difficulty expected  Dental - normal exam     Pulmonary - normal exam   (+) pneumonia resolved , sleep apnea on CPAP,   (-) COPD, asthma, not a smoker, lung cancer  Cardiovascular - normal exam  Exercise tolerance: good (4-7 METS)    ECG reviewed  Patient on routine beta blocker and Beta blocker given within 24 hours of surgery  Rhythm: regular  Rate: normal    (+) pacemaker pacemaker interrogated unknown, hypertension well controlled 2 medications or greater, CAD, dysrhythmias Atrial Fib, angina with exertion, PVD, hyperlipidemia,   (-) valvular problems/murmurs, past MI, CHF, cardiac stents, CABG, pericardial effusion    ROS comment: Known hx/o CAD, afib and AAA s/p [employer] pacer.    TTE [facility] 03/2021:  The estimated ejection fraction is 35-40% with moderate global hypokinesis.    There is mild concentric left ventricular hypertrophy.    Left and right atrial enlargement is visualized.    Left ventricular diastolic function is abnormal.    Right ventricular systolic function is mildly reduced.    No significant valvular abnormalities are visualized.    There is no evidence of a pericardial effusion.       Neuro/Psych- negative ROS  (-) seizures, TIA, CVA  GI/Hepatic/Renal/Endo    (+)  GERD poorly controlled, GI bleeding ,   (-) hiatal hernia, PUD, hepatitis, liver disease, no renal disease, diabetes, no thyroid disorder    Musculoskeletal (-) negative ROS    Abdominal  - normal exam   Substance History - negative use     OB/GYN negative ob/gyn ROS         Other   blood dyscrasia anemia,                   Anesthesia Plan    ASA 3     MAC     intravenous induction     Anesthetic plan, all risks, benefits, and alternatives have been provided, discussed and informed  consent has been obtained with: patient.        CODE STATUS:

## 2022-04-22 NOTE — BRIEF OP NOTE
ESOPHAGOGASTRODUODENOSCOPY  Progress Note    Scar Pérez  4/22/2022    Pre-op Diagnosis:   Gastroesophageal reflux disease, unspecified whether esophagitis present [K21.9]  Esophageal dysphagia [R13.19]       Post-Op Diagnosis Codes:     * Gastroesophageal reflux disease, unspecified whether esophagitis present [K21.9]     * Esophageal dysphagia [R13.19]     * Schatzki's ring [K22.2]     * Hiatal hernia [K44.9]    Procedure/CPT® Codes:        Procedure(s):  ESOPHAGOGASTRODUODENOSCOPY with ballon dialation 15 and 18    Surgeon(s):  Shimon Luis MD    Anesthesia: Monitored Anesthesia Care    Staff:   Endo Technician: Telma Christine  Endo Nurse: Isela Johnson RN         Estimated Blood Loss: minimal    Urine Voided: * No values recorded between 4/22/2022  1:07 PM and 4/22/2022  1:20 PM *    Specimens:                None          Drains: * No LDAs found *    Findings: EGD with balloon dilation performed revealed normal duodenum with normal gastric mucosa.  Small hiatal hernia was identified with a Schatzki's ring noted at the GE junction.  Balloon dilation to 15 then 18 mm was performed to good effect.  Patient tolerated well and sent to recovery.    Complications: None          Shimon Luis MD     Date: 4/22/2022  Time: 13:21 EDT

## 2022-04-22 NOTE — H&P
[facility]  Pre Procedure History & Physical    Chief Complaint:   Esophageal dysphagia    Subjective     HPI:   Patient 87-year-old male with history of hyperlipidemia, hypertension, hyperglycemia with esophageal reflux and episode of food bolus.  Patient found with food impacted distal esophagus with no obvious stricture.  Patient denies any nausea vomiting or melena or bright red blood per rectum.  Patient here for EGD.    Past Medical History:   Past Medical History:   Diagnosis Date   • Abdominal aortic aneurysm (HCC)    • Benign essential hypertension    • Coronary atherosclerosis    • Esophageal reflux    • Hyperglycemia    • Hyperlipidemia     '   • Sick sinus syndrome (HCC)        Past Surgical History:  Past Surgical History:   Procedure Laterality Date   • CARDIAC PACEMAKER PLACEMENT     • CARDIAC PACEMAKER PLACEMENT     • COLONOSCOPY  11/05/2014    [facility]    • CORONARY ANGIOPLASTY     • CORONARY ANGIOPLASTY WITH STENT PLACEMENT     • ENDOSCOPY N/A 1/22/2022    Procedure: ESOPHAGOGASTRODUODENOSCOPY FOR FOOD BOLUS;  Surgeon: Alfredo Acevedo MD;  Location: [facility];  Service: Gastroenterology;  Laterality: N/A;   • OTHER SURGICAL HISTORY      CATARACT SURGERY   • OTHER SURGICAL HISTORY      ROTATOR CUFF REPAIR    • UPPER GASTROINTESTINAL ENDOSCOPY  11/03/2014    [facility]       Family History:  Family History   Problem Relation Age of Onset   • Leukemia Father    • Heart disease Maternal Aunt    • Diabetes Daughter        Social History:   reports that he has never smoked. He has never used smokeless tobacco. He reports current alcohol use. He reports that he does not use drugs.    Medications:   Medications Prior to Admission   Medication Sig Dispense Refill Last Dose   • aspirin 325 MG tablet Take 1 tablet by mouth daily.   4/21/2022 at 0830   • atorvastatin (LIPITOR) 20 MG tablet Take 20 mg by mouth daily.   4/21/2022 at Unknown time   • Cholecalciferol (VITAMIN D3) 5000 UNITS  "tablet Take by mouth.   4/21/2022 at Unknown time   • Cyanocobalamin (B-12) 1000 MCG capsule Take 1 capsule by mouth.   4/21/2022 at Unknown time   • hydroCHLOROthiazide (MICROZIDE) 12.5 MG capsule Take 1 capsule by mouth Daily. 90 capsule 2 4/21/2022 at Unknown time   • metoprolol tartrate (Lopressor) 50 MG tablet Take 1 tablet by mouth 2 (Two) Times a Day. 60 tablet 4 4/21/2022 at Unknown time   • niacin 500 MG tablet Take 1 tablet by mouth daily.   4/21/2022 at Unknown time   • pantoprazole (PROTONIX) 40 MG EC tablet Take 1 tablet by mouth 2 (Two) Times a Day. 180 tablet 3 4/21/2022 at Unknown time   • valsartan (Diovan) 80 MG tablet Take 1 tablet by mouth Daily. (Patient taking differently: Take 40 mg by mouth Daily.) 30 tablet 3 4/21/2022 at Unknown time   • nitroglycerin (NITROSTAT) 0.4 MG SL tablet Place 0.4 mg under the tongue.   More than a month at Unknown time   • trospium (SANCTURA) 20 MG tablet Take 20 mg by mouth 2 (Two) Times a Day.   More than a month at Unknown time       Allergies:  Iodine    ROS:    Pertinent items are noted in HPI     Objective     Blood pressure 145/82, pulse 62, resp. rate 16, height 180.3 cm (71\"), weight 109 kg (239 lb 8 oz), SpO2 97 %.    Physical Exam   Constitutional: Pt is oriented to person, place, and time and well-developed, well-nourished, and in no distress.   Mouth/Throat: Oropharynx is clear and moist.   Neck: Normal range of motion.   Cardiovascular: Normal rate, regular rhythm and normal heart sounds.    Pulmonary/Chest: Effort normal and breath sounds normal.   Abdominal: Soft. Nontender  Skin: Skin is warm and dry.   Psychiatric: Mood, memory, affect and judgment normal.     Assessment/Plan     Diagnosis:  Esophageal dysphagia  Food impaction    Anticipated Surgical Procedure:  EGD    The risks, benefits, and alternatives of this procedure have been discussed with the patient or the responsible party- the patient understands and agrees to " proceed.

## 2022-04-26 ENCOUNTER — APPOINTMENT (OUTPATIENT)
Dept: VACCINE CLINIC | Facility: HOSPITAL | Age: 87
End: 2022-04-26

## 2022-04-27 ENCOUNTER — TRANSCRIBE ORDERS (OUTPATIENT)
Dept: ADMINISTRATIVE | Facility: HOSPITAL | Age: 87
End: 2022-04-27

## 2022-04-27 DIAGNOSIS — Z13.6 ENCOUNTER FOR SCREENING FOR VASCULAR DISEASE: Primary | ICD-10-CM

## 2022-07-12 ENCOUNTER — OFFICE VISIT (OUTPATIENT)
Dept: INTERNAL MEDICINE | Facility: CLINIC | Age: 87
End: 2022-07-12

## 2022-07-12 VITALS
HEART RATE: 87 BPM | HEIGHT: 71 IN | BODY MASS INDEX: 32.62 KG/M2 | WEIGHT: 233 LBS | SYSTOLIC BLOOD PRESSURE: 124 MMHG | DIASTOLIC BLOOD PRESSURE: 78 MMHG

## 2022-07-12 DIAGNOSIS — I48.11 LONGSTANDING PERSISTENT ATRIAL FIBRILLATION: Primary | ICD-10-CM

## 2022-07-12 DIAGNOSIS — I50.21 ACUTE SYSTOLIC CHF (CONGESTIVE HEART FAILURE): ICD-10-CM

## 2022-07-12 PROCEDURE — 99214 OFFICE O/P EST MOD 30 MIN: CPT | Performed by: INTERNAL MEDICINE

## 2022-07-12 RX ORDER — MAGNESIUM OXIDE 400 MG/1
400 TABLET ORAL DAILY
COMMUNITY
Start: 2022-06-26 | End: 2022-07-26

## 2022-07-12 RX ORDER — DIGOXIN 125 MCG
125 TABLET ORAL DAILY
COMMUNITY
Start: 2022-06-26 | End: 2022-07-26

## 2022-07-12 RX ORDER — METOPROLOL SUCCINATE 25 MG/1
25 TABLET, EXTENDED RELEASE ORAL
COMMUNITY
Start: 2022-06-25 | End: 2022-07-25

## 2022-07-12 RX ORDER — LISINOPRIL 2.5 MG/1
2.5 TABLET ORAL DAILY
COMMUNITY
Start: 2022-06-26 | End: 2022-07-26

## 2022-07-12 RX ORDER — FUROSEMIDE 40 MG/1
40 TABLET ORAL DAILY
COMMUNITY
Start: 2022-06-26 | End: 2022-07-26

## 2022-07-12 NOTE — PROGRESS NOTES
"Chief Complaint   Patient presents with   • Hospital Follow Up Visit   • Atrial Fibrillation       History of Present Illness   Scar Pérez is a 88 y.o. male presents for hospital follow up atrial fib. Patient was admitted for atrial fibrillation with RVR. He was c ontacted after hospitaliziont w/in 7 days to make todays appointment.  He also has a pacer/ AICD and was found to have 2 episodes NSVT. He had an ablation procedure. He was noted to have regular rhythm after procedure. Patient reports today that when he went to ER he could barely breath. After discharge he has not had any recurrence of this. He has had some continued fatigue \"I can't hardly do anything lets put it that way\". Does feel better after sleep. Pulse 87 today. He is on furosemide for diuresis for chf. He notes that he sometimes can experience urinary incontinence. Has appointment w/ urology but it was postponed related to hospitalization. He avoids caffeine. He is hydrating within boundries for cardiac needs.   Patient is tracking bp and pulse bid. bp runs 110-133/ 61-80s. Pulse 80s-90s. He is following w/ cardiology clinic. He has lost 6 #. Bmp and cbc reviewed and wnl.          The following portions of the patient's history were reviewed and updated as appropriate: allergies, current medications, past family history, past medical history, past social history, past surgical history and problem list.  Current Outpatient Medications on File Prior to Visit   Medication Sig Dispense Refill   • atorvastatin (LIPITOR) 20 MG tablet Take 20 mg by mouth daily.     • Cholecalciferol (VITAMIN D3) 5000 UNITS tablet Take by mouth.     • Cyanocobalamin (B-12) 1000 MCG capsule Take 1 capsule by mouth.     • digoxin (LANOXIN) 125 MCG tablet Take 125 mcg by mouth Daily.     • furosemide (LASIX) 40 MG tablet Take 40 mg by mouth Daily.     • lisinopril (PRINIVIL,ZESTRIL) 2.5 MG tablet Take 2.5 mg by mouth Daily.     • magnesium oxide (MAG-OX) 400 MG tablet " Take 400 mg by mouth Daily.     • metoprolol succinate XL (TOPROL-XL) 25 MG 24 hr tablet Take 25 mg by mouth.     • niacin 500 MG tablet Take 1 tablet by mouth daily.     • pantoprazole (PROTONIX) 40 MG EC tablet Take 1 tablet by mouth 2 (Two) Times a Day. 180 tablet 3   • aspirin 325 MG tablet Take 1 tablet by mouth daily.     • hydroCHLOROthiazide (MICROZIDE) 12.5 MG capsule Take 1 capsule by mouth Daily. 90 capsule 2   • metoprolol tartrate (Lopressor) 50 MG tablet Take 1 tablet by mouth 2 (Two) Times a Day. 60 tablet 4   • nitroglycerin (NITROSTAT) 0.4 MG SL tablet Place 0.4 mg under the tongue.     • trospium (SANCTURA) 20 MG tablet Take 20 mg by mouth 2 (Two) Times a Day.     • valsartan (Diovan) 80 MG tablet Take 1 tablet by mouth Daily. (Patient taking differently: Take 40 mg by mouth Daily.) 30 tablet 3     No current facility-administered medications on file prior to visit.     Review of Systems    Objective   Physical Exam  Vitals and nursing note reviewed.   Constitutional:       Appearance: Normal appearance.      Comments: Mild fatigue. No acute distress   HENT:      Head: Normocephalic and atraumatic.      Right Ear: Tympanic membrane normal.      Left Ear: Tympanic membrane normal.      Nose: Nose normal.      Mouth/Throat:      Mouth: Mucous membranes are moist.   Eyes:      Extraocular Movements: Extraocular movements intact.      Pupils: Pupils are equal, round, and reactive to light.   Cardiovascular:      Rate and Rhythm: Normal rate and regular rhythm.      Pulses: Normal pulses.      Heart sounds: Normal heart sounds.      Comments: Trace-1+ edema B lower extremity  Pulmonary:      Effort: Pulmonary effort is normal.      Breath sounds: Normal breath sounds.   Abdominal:      General: Abdomen is flat.      Palpations: Abdomen is soft.   Musculoskeletal:         General: Normal range of motion.      Cervical back: Normal range of motion.   Skin:     General: Skin is warm and dry.  "  Neurological:      General: No focal deficit present.      Mental Status: He is alert and oriented to person, place, and time.   Psychiatric:         Mood and Affect: Mood normal.         Behavior: Behavior normal.         Thought Content: Thought content normal.         Judgment: Judgment normal.          /78   Pulse 87   Ht 180.3 cm (71\")   Wt 106 kg (233 lb)   BMI 32.50 kg/m²     Assessment & Plan   Diagnoses and all orders for this visit:    Longstanding persistent atrial fibrillation (HCC)  -     Basic Metabolic Panel  -     Magnesium    Acute systolic CHF (congestive heart failure) (HCC)  -     Basic Metabolic Panel  -     Magnesium    Other orders  -     digoxin (LANOXIN) 125 MCG tablet; Take 125 mcg by mouth Daily.  -     furosemide (LASIX) 40 MG tablet; Take 40 mg by mouth Daily.  -     lisinopril (PRINIVIL,ZESTRIL) 2.5 MG tablet; Take 2.5 mg by mouth Daily.  -     magnesium oxide (MAG-OX) 400 MG tablet; Take 400 mg by mouth Daily.  -     metoprolol succinate XL (TOPROL-XL) 25 MG 24 hr tablet; Take 25 mg by mouth.    patient recently d/c w/ atrial fibrillation. He is now in sinus rhythm. He is to gradually increase activity. Avoid caffeine. Maintain fluid within restrictions. Will continue current meds. Will repeat electrolyte levels and renal function now on lasix. Will also test mag levels on mag supplement. He will f/u w/ cardiology as scheduled. Continue to track bp pulse and weight. He will follow up in this office routinely.              "

## 2022-07-13 LAB
BUN SERPL-MCNC: 22 MG/DL (ref 8–27)
BUN/CREAT SERPL: 24 (ref 10–24)
CALCIUM SERPL-MCNC: 9.2 MG/DL (ref 8.6–10.2)
CHLORIDE SERPL-SCNC: 100 MMOL/L (ref 96–106)
CO2 SERPL-SCNC: 27 MMOL/L (ref 20–29)
CREAT SERPL-MCNC: 0.93 MG/DL (ref 0.76–1.27)
EGFRCR SERPLBLD CKD-EPI 2021: 79 ML/MIN/1.73
GLUCOSE SERPL-MCNC: 101 MG/DL (ref 65–99)
MAGNESIUM SERPL-MCNC: 1.8 MG/DL (ref 1.6–2.3)
POTASSIUM SERPL-SCNC: 4.2 MMOL/L (ref 3.5–5.2)
SODIUM SERPL-SCNC: 140 MMOL/L (ref 134–144)

## 2022-07-15 ENCOUNTER — TELEPHONE (OUTPATIENT)
Dept: INTERNAL MEDICINE | Facility: CLINIC | Age: 87
End: 2022-07-15

## 2022-07-15 NOTE — TELEPHONE ENCOUNTER
Pt informed    ----- Message from Marcelle Fields MD sent at 7/14/2022  6:27 PM EDT -----  Tested lab results are normal  jw

## 2022-09-19 ENCOUNTER — TRANSCRIBE ORDERS (OUTPATIENT)
Dept: ADMINISTRATIVE | Facility: HOSPITAL | Age: 87
End: 2022-09-19

## 2022-09-19 DIAGNOSIS — Z13.6 ENCOUNTER FOR SCREENING FOR VASCULAR DISEASE: Primary | ICD-10-CM

## 2022-09-20 ENCOUNTER — APPOINTMENT (OUTPATIENT)
Dept: CARDIOLOGY | Facility: HOSPITAL | Age: 87
End: 2022-09-20

## 2022-09-20 ENCOUNTER — HOSPITAL ENCOUNTER (OUTPATIENT)
Dept: CARDIOLOGY | Facility: HOSPITAL | Age: 87
Discharge: HOME | End: 2022-09-20
Admitting: INTERNAL MEDICINE

## 2022-09-20 VITALS
HEART RATE: 60 BPM | WEIGHT: 231 LBS | BODY MASS INDEX: 33.07 KG/M2 | SYSTOLIC BLOOD PRESSURE: 113 MMHG | HEIGHT: 70 IN | DIASTOLIC BLOOD PRESSURE: 68 MMHG

## 2022-09-20 DIAGNOSIS — Z13.6 ENCOUNTER FOR SCREENING FOR VASCULAR DISEASE: ICD-10-CM

## 2022-09-20 LAB
BH CV VAS BP LEFT ARM: NORMAL MMHG
BH CV VAS BP RIGHT ARM: NORMAL MMHG
BH CV XLRA MEAS - PAD LEFT ABI DP: 0.83
BH CV XLRA MEAS - PAD LEFT ABI PT: 0.78
BH CV XLRA MEAS - PAD LEFT ARM: 112 MMHG
BH CV XLRA MEAS - PAD LEFT LEG DP: 94 MMHG
BH CV XLRA MEAS - PAD LEFT LEG PT: 88 MMHG
BH CV XLRA MEAS - PAD RIGHT ABI DP: 1.17
BH CV XLRA MEAS - PAD RIGHT ABI PT: 1.21
BH CV XLRA MEAS - PAD RIGHT ARM: 113 MMHG
BH CV XLRA MEAS - PAD RIGHT LEG DP: 132 MMHG
BH CV XLRA MEAS - PAD RIGHT LEG PT: 137 MMHG
BH CV XLRA MEAS LEFT ICA/CCA RATIO: 0.78
BH CV XLRA MEAS LEFT MID CCA PSV: NORMAL CM/SEC
BH CV XLRA MEAS LEFT MID ICA PSV: NORMAL CM/SEC
BH CV XLRA MEAS LEFT PROX ECA PSV: NORMAL CM/SEC
BH CV XLRA MEAS RIGHT ICA/CCA RATIO: 0.7
BH CV XLRA MEAS RIGHT MID CCA PSV: NORMAL CM/SEC
BH CV XLRA MEAS RIGHT MID ICA PSV: NORMAL CM/SEC
BH CV XLRA MEAS RIGHT PROX ECA PSV: NORMAL CM/SEC
MAXIMAL PREDICTED HEART RATE: 132 BPM
STRESS TARGET HR: 112 BPM

## 2022-09-20 PROCEDURE — 93799 UNLISTED CV SVC/PROCEDURE: CPT

## 2022-09-23 DIAGNOSIS — I73.9 PERIPHERAL ARTERY DISEASE: Primary | ICD-10-CM

## 2022-10-20 DIAGNOSIS — I10 BENIGN ESSENTIAL HYPERTENSION: Primary | ICD-10-CM

## 2022-10-20 DIAGNOSIS — R73.01 IMPAIRED FASTING GLUCOSE: ICD-10-CM

## 2022-10-20 DIAGNOSIS — E87.5 HYPERKALEMIA: ICD-10-CM

## 2022-10-26 ENCOUNTER — HOSPITAL ENCOUNTER (OUTPATIENT)
Dept: CARDIOLOGY | Facility: HOSPITAL | Age: 87
Discharge: HOME | End: 2022-10-26
Admitting: INTERNAL MEDICINE

## 2022-10-26 DIAGNOSIS — I73.9 PERIPHERAL ARTERY DISEASE: ICD-10-CM

## 2022-10-26 LAB
ALBUMIN SERPL-MCNC: 3.6 G/DL (ref 3.5–5.2)
ALBUMIN/GLOB SERPL: 1.3 G/DL
ALP SERPL-CCNC: 68 U/L (ref 39–117)
ALT SERPL-CCNC: 17 U/L (ref 1–41)
AST SERPL-CCNC: 16 U/L (ref 1–40)
BASOPHILS # BLD AUTO: 0.05 10*3/MM3 (ref 0–0.2)
BASOPHILS NFR BLD AUTO: 0.7 % (ref 0–1.5)
BILIRUB SERPL-MCNC: 0.9 MG/DL (ref 0–1.2)
BUN SERPL-MCNC: 18 MG/DL (ref 8–23)
BUN/CREAT SERPL: 18.9 (ref 7–25)
CALCIUM SERPL-MCNC: 9.4 MG/DL (ref 8.6–10.5)
CHLORIDE SERPL-SCNC: 105 MMOL/L (ref 98–107)
CO2 SERPL-SCNC: 33.8 MMOL/L (ref 22–29)
CREAT SERPL-MCNC: 0.95 MG/DL (ref 0.76–1.27)
EGFRCR SERPLBLD CKD-EPI 2021: 77 ML/MIN/1.73
EOSINOPHIL # BLD AUTO: 0.35 10*3/MM3 (ref 0–0.4)
EOSINOPHIL NFR BLD AUTO: 4.6 % (ref 0.3–6.2)
ERYTHROCYTE [DISTWIDTH] IN BLOOD BY AUTOMATED COUNT: 12.6 % (ref 12.3–15.4)
GLOBULIN SER CALC-MCNC: 2.8 GM/DL
GLUCOSE SERPL-MCNC: 109 MG/DL (ref 65–99)
HBA1C MFR BLD: 5.9 % (ref 4.8–5.6)
HCT VFR BLD AUTO: 42.7 % (ref 37.5–51)
HGB BLD-MCNC: 14.2 G/DL (ref 13–17.7)
IMM GRANULOCYTES # BLD AUTO: 0.02 10*3/MM3 (ref 0–0.05)
IMM GRANULOCYTES NFR BLD AUTO: 0.3 % (ref 0–0.5)
LYMPHOCYTES # BLD AUTO: 1.51 10*3/MM3 (ref 0.7–3.1)
LYMPHOCYTES NFR BLD AUTO: 20.1 % (ref 19.6–45.3)
MCH RBC QN AUTO: 30.9 PG (ref 26.6–33)
MCHC RBC AUTO-ENTMCNC: 33.3 G/DL (ref 31.5–35.7)
MCV RBC AUTO: 92.8 FL (ref 79–97)
MONOCYTES # BLD AUTO: 0.77 10*3/MM3 (ref 0.1–0.9)
MONOCYTES NFR BLD AUTO: 10.2 % (ref 5–12)
NEUTROPHILS # BLD AUTO: 4.83 10*3/MM3 (ref 1.7–7)
NEUTROPHILS NFR BLD AUTO: 64.1 % (ref 42.7–76)
NRBC BLD AUTO-RTO: 0 /100 WBC (ref 0–0.2)
PLATELET # BLD AUTO: 202 10*3/MM3 (ref 140–450)
POTASSIUM SERPL-SCNC: 4.6 MMOL/L (ref 3.5–5.2)
PROT SERPL-MCNC: 6.4 G/DL (ref 6–8.5)
RBC # BLD AUTO: 4.6 10*6/MM3 (ref 4.14–5.8)
SODIUM SERPL-SCNC: 144 MMOL/L (ref 136–145)
WBC # BLD AUTO: 7.53 10*3/MM3 (ref 3.4–10.8)

## 2022-10-26 PROCEDURE — 93923 UPR/LXTR ART STDY 3+ LVLS: CPT

## 2022-10-27 LAB
BH CV LOWER ARTERIAL LEFT ABI RATIO: 0.78
BH CV LOWER ARTERIAL LEFT CALF RATIO: 0.97
BH CV LOWER ARTERIAL LEFT DORSALIS PEDIS SYS MAX: 100
BH CV LOWER ARTERIAL LEFT GREAT TOE SYS MAX: 76
BH CV LOWER ARTERIAL LEFT HIGH THIGH RATIO: 1.33
BH CV LOWER ARTERIAL LEFT HIGH THIGH SYS MAX: 171
BH CV LOWER ARTERIAL LEFT LOW THIGH RATIO: 1.3
BH CV LOWER ARTERIAL LEFT LOW THIGH SYS MAX: 168
BH CV LOWER ARTERIAL LEFT POPLITEAL SYS MAX: 125
BH CV LOWER ARTERIAL LEFT POST TIBIAL SYS MAX: 96
BH CV LOWER ARTERIAL LEFT TBI RATIO: 0.59
BH CV LOWER ARTERIAL RIGHT ABI RATIO: 1.16
BH CV LOWER ARTERIAL RIGHT CALF RATIO: 1.19
BH CV LOWER ARTERIAL RIGHT DORSALIS PEDIS SYS MAX: 140
BH CV LOWER ARTERIAL RIGHT GREAT TOE SYS MAX: 82
BH CV LOWER ARTERIAL RIGHT HIGH THIGH RATIO: 1.35
BH CV LOWER ARTERIAL RIGHT HIGH THIGH SYS MAX: 174
BH CV LOWER ARTERIAL RIGHT LOW THIGH RATIO: 1.35
BH CV LOWER ARTERIAL RIGHT LOW THIGH SYS MAX: 174
BH CV LOWER ARTERIAL RIGHT POPLITEAL SYS MAX: 153
BH CV LOWER ARTERIAL RIGHT POST TIBIAL SYS MAX: 150
BH CV LOWER ARTERIAL RIGHT TBI RATIO: 0.64
MAXIMAL PREDICTED HEART RATE: 132 BPM
STRESS TARGET HR: 112 BPM
UPPER ARTERIAL LEFT ARM BRACHIAL SYS MAX: 129 MMHG
UPPER ARTERIAL RIGHT ARM BRACHIAL SYS MAX: 116 MMHG

## 2022-10-31 ENCOUNTER — OFFICE VISIT (OUTPATIENT)
Dept: INTERNAL MEDICINE | Facility: CLINIC | Age: 87
End: 2022-10-31

## 2022-10-31 VITALS
DIASTOLIC BLOOD PRESSURE: 72 MMHG | HEART RATE: 70 BPM | HEIGHT: 70 IN | TEMPERATURE: 98.6 F | OXYGEN SATURATION: 97 % | WEIGHT: 231.4 LBS | BODY MASS INDEX: 33.13 KG/M2 | SYSTOLIC BLOOD PRESSURE: 122 MMHG

## 2022-10-31 DIAGNOSIS — I73.9 PAD (PERIPHERAL ARTERY DISEASE): ICD-10-CM

## 2022-10-31 DIAGNOSIS — I48.11 LONGSTANDING PERSISTENT ATRIAL FIBRILLATION: Primary | ICD-10-CM

## 2022-10-31 DIAGNOSIS — I10 BENIGN ESSENTIAL HYPERTENSION: ICD-10-CM

## 2022-10-31 PROCEDURE — 99214 OFFICE O/P EST MOD 30 MIN: CPT | Performed by: INTERNAL MEDICINE

## 2022-10-31 NOTE — PROGRESS NOTES
Chief Complaint   Patient presents with   • Coronary Artery Disease   • Atrial Fibrillation   • Hypertension   • peripheral artery disease       History of Present Illness   Scar Pérez is a 88 y.o. male presents for follow up evaluation. Patient has htn, cad, afib, sick sinus. He recently had eyad tested w/ normal right and moderate left moderately reduced eyad w/ moderate digital ischemia. He will have this followed annually by vascular surgery. He is to have an u/s possibly cta of his abdominal aorta. He has htn. bp has been normotensive. Cbc, cmp, hgb a1c reviewed and at goal. He does note urinary frequency with furosemide and hctz.         The following portions of the patient's history were reviewed and updated as appropriate: allergies, current medications, past family history, past medical history, past social history, past surgical history and problem list.  Current Outpatient Medications on File Prior to Visit   Medication Sig Dispense Refill   • aspirin 325 MG tablet Take 1 tablet by mouth daily.     • atorvastatin (LIPITOR) 20 MG tablet Take 20 mg by mouth daily.     • Cholecalciferol (VITAMIN D3) 5000 UNITS tablet Take by mouth.     • Cyanocobalamin (B-12) 1000 MCG capsule Take 1 capsule by mouth.     • metoprolol tartrate (Lopressor) 50 MG tablet Take 1 tablet by mouth 2 (Two) Times a Day. 60 tablet 4   • Mirabegron ER (Myrbetriq) 25 MG tablet sustained-release 24 hour 24 hr tablet Take 1 tablet by mouth Daily.     • niacin 500 MG tablet Take 1 tablet by mouth daily.     • nitroglycerin (NITROSTAT) 0.4 MG SL tablet Place 0.4 mg under the tongue.     • pantoprazole (PROTONIX) 40 MG EC tablet Take 1 tablet by mouth 2 (Two) Times a Day. 180 tablet 3   • trospium (SANCTURA) 20 MG tablet Take 20 mg by mouth 2 (Two) Times a Day.     • valsartan (Diovan) 80 MG tablet Take 1 tablet by mouth Daily. (Patient taking differently: Take 40 mg by mouth Daily.) 30 tablet 3   • [DISCONTINUED] hydroCHLOROthiazide  (MICROZIDE) 12.5 MG capsule Take 1 capsule by mouth Daily. 90 capsule 2   • digoxin (LANOXIN) 125 MCG tablet Take 125 mcg by mouth Daily.     • furosemide (LASIX) 40 MG tablet Take 40 mg by mouth Daily.     • lisinopril (PRINIVIL,ZESTRIL) 2.5 MG tablet Take 2.5 mg by mouth Daily.     • metoprolol succinate XL (TOPROL-XL) 25 MG 24 hr tablet Take 25 mg by mouth.       No current facility-administered medications on file prior to visit.     Review of Systems   Constitutional: Negative.    HENT: Negative.    Eyes: Negative.    Respiratory: Negative.    Cardiovascular: Positive for leg swelling.        R>L   Gastrointestinal: Negative.    Endocrine: Negative.    Genitourinary: Positive for frequency.   Musculoskeletal: Negative.    Allergic/Immunologic: Negative.    Neurological: Negative.    Hematological: Negative.    Psychiatric/Behavioral: Negative.        Objective   Physical Exam  Vitals and nursing note reviewed.   Constitutional:       Appearance: Normal appearance.   HENT:      Head: Normocephalic and atraumatic.      Right Ear: Tympanic membrane normal.      Left Ear: Tympanic membrane normal.      Nose: Nose normal.      Mouth/Throat:      Mouth: Mucous membranes are moist.   Eyes:      Extraocular Movements: Extraocular movements intact.      Pupils: Pupils are equal, round, and reactive to light.   Cardiovascular:      Rate and Rhythm: Normal rate.      Pulses: Normal pulses.      Heart sounds: Normal heart sounds.   Pulmonary:      Effort: Pulmonary effort is normal.      Breath sounds: Normal breath sounds.   Abdominal:      General: Abdomen is flat.      Palpations: Abdomen is soft.   Musculoskeletal:      Cervical back: Normal range of motion and neck supple.   Skin:     General: Skin is warm and dry.   Neurological:      General: No focal deficit present.      Mental Status: He is alert and oriented to person, place, and time.   Psychiatric:         Mood and Affect: Mood normal.         Behavior:  "Behavior normal.         Thought Content: Thought content normal.         Judgment: Judgment normal.          /72   Pulse 70   Temp 98.6 °F (37 °C)   Ht 177.8 cm (70\")   Wt 105 kg (231 lb 6.4 oz)   SpO2 97%   BMI 33.20 kg/m²     Assessment & Plan   Diagnoses and all orders for this visit:    Longstanding persistent atrial fibrillation (HCC)    Benign essential hypertension    PAD (peripheral artery disease) (HCC)    Other orders  -     Mirabegron ER (Myrbetriq) 25 MG tablet sustained-release 24 hour 24 hr tablet; Take 1 tablet by mouth Daily.      Patient w/ atrial fibrillation. He is rate controlled on asa and will f/u w/ cardiology routinely. He is to discuss diuretics w/ cardiology this week. Unsure if taking hctz but can likely d/c and cover fluid regulation w/ furosemide alone. He will monitor bp. He will continue med for bladder/ prostate as well. He will f/u here in 6 mo or prn. Reviewed hgba1c, cmp, cbc. Monitor bp for hypotension. Has mild pad. Will f/u vascular routinely. He is also to have aaa evaluation. Routine pacer check ups.            "

## 2022-11-04 ENCOUNTER — HOSPITAL ENCOUNTER (OUTPATIENT)
Dept: CT IMAGING | Facility: HOSPITAL | Age: 87
Discharge: HOME | End: 2022-11-04
Admitting: INTERNAL MEDICINE

## 2022-11-04 DIAGNOSIS — R91.8 PULMONARY NODULES: ICD-10-CM

## 2022-11-04 PROCEDURE — 71250 CT THORAX DX C-: CPT

## 2022-11-08 ENCOUNTER — TELEPHONE (OUTPATIENT)
Dept: INTERNAL MEDICINE | Facility: CLINIC | Age: 87
End: 2022-11-08

## 2022-11-08 NOTE — TELEPHONE ENCOUNTER
LM with pts wife  ----- Message from Lila Hamm MA sent at 11/8/2022  3:38 PM EST -----    ----- Message -----  From: Marcelle Fields MD  Sent: 11/8/2022   8:15 AM EST  To: Lila Hamm MA    Patient CT chest w/ stability. Had cardiac echo in September w/ no aortic dilation. Advise patient stable CT findings/ repeat one year. Forward image to patient's cardiologist (Dr. Del Real).  JW

## 2023-01-20 NOTE — PROGRESS NOTES
Tolerated exercise well, no complaints voiced.  
PAST SURGICAL HISTORY:  No significant past surgical history

## 2023-03-27 ENCOUNTER — TELEPHONE (OUTPATIENT)
Dept: INTERNAL MEDICINE | Facility: CLINIC | Age: 88
End: 2023-03-27
Payer: MEDICARE

## 2023-03-27 NOTE — TELEPHONE ENCOUNTER
Caller: Scar Pérez    Relationship: Self    Best call back number: [phone]    What is the best time to reach you: ANY    Who are you requesting to speak with (clinical staff, provider,  specific staff member): CLINICAL/ DOCTOR TIGIST WHALEN      What was the call regarding: WOULD LIKE TO DISCUSS CHANGING HIS AWV IN April. DIDN'T WANT TO WAIT UNTIL July.     Do you require a callback: YES

## 2023-04-06 DIAGNOSIS — D50.9 IRON DEFICIENCY ANEMIA, UNSPECIFIED IRON DEFICIENCY ANEMIA TYPE: ICD-10-CM

## 2023-04-06 DIAGNOSIS — Z12.5 SCREENING FOR PROSTATE CANCER: ICD-10-CM

## 2023-04-06 DIAGNOSIS — E87.6 HYPOPOTASSEMIA: ICD-10-CM

## 2023-04-06 DIAGNOSIS — E78.00 PURE HYPERCHOLESTEROLEMIA: ICD-10-CM

## 2023-04-06 DIAGNOSIS — I10 BENIGN ESSENTIAL HYPERTENSION: Primary | ICD-10-CM

## 2023-04-06 DIAGNOSIS — E87.5 HYPERKALEMIA: ICD-10-CM

## 2023-04-21 LAB
ALBUMIN SERPL-MCNC: 4.1 G/DL (ref 3.5–5.2)
ALBUMIN/GLOB SERPL: 1.5 G/DL
ALP SERPL-CCNC: 65 U/L (ref 39–117)
ALT SERPL-CCNC: 22 U/L (ref 1–41)
APPEARANCE UR: CLEAR
AST SERPL-CCNC: 18 U/L (ref 1–40)
BACTERIA #/AREA URNS HPF: NORMAL /HPF
BASOPHILS # BLD AUTO: 0.03 10*3/MM3 (ref 0–0.2)
BASOPHILS NFR BLD AUTO: 0.4 % (ref 0–1.5)
BILIRUB SERPL-MCNC: 0.8 MG/DL (ref 0–1.2)
BILIRUB UR QL STRIP: NEGATIVE
BUN SERPL-MCNC: 22 MG/DL (ref 8–23)
BUN/CREAT SERPL: 22.9 (ref 7–25)
CALCIUM SERPL-MCNC: 9.8 MG/DL (ref 8.6–10.5)
CASTS URNS QL MICRO: NORMAL /LPF
CHLORIDE SERPL-SCNC: 103 MMOL/L (ref 98–107)
CHOLEST SERPL-MCNC: 110 MG/DL (ref 0–200)
CO2 SERPL-SCNC: 32 MMOL/L (ref 22–29)
COLOR UR: YELLOW
CREAT SERPL-MCNC: 0.96 MG/DL (ref 0.76–1.27)
EGFRCR SERPLBLD CKD-EPI 2021: 76 ML/MIN/1.73
EOSINOPHIL # BLD AUTO: 0.23 10*3/MM3 (ref 0–0.4)
EOSINOPHIL NFR BLD AUTO: 3.2 % (ref 0.3–6.2)
EPI CELLS #/AREA URNS HPF: NORMAL /HPF (ref 0–10)
ERYTHROCYTE [DISTWIDTH] IN BLOOD BY AUTOMATED COUNT: 13.6 % (ref 12.3–15.4)
GLOBULIN SER CALC-MCNC: 2.7 GM/DL
GLUCOSE SERPL-MCNC: 107 MG/DL (ref 65–99)
GLUCOSE UR QL STRIP: NEGATIVE
HCT VFR BLD AUTO: 44.3 % (ref 37.5–51)
HDLC SERPL-MCNC: 33 MG/DL (ref 40–60)
HGB BLD-MCNC: 14.7 G/DL (ref 13–17.7)
HGB UR QL STRIP: NEGATIVE
IMM GRANULOCYTES # BLD AUTO: 0.02 10*3/MM3 (ref 0–0.05)
IMM GRANULOCYTES NFR BLD AUTO: 0.3 % (ref 0–0.5)
KETONES UR QL STRIP: NEGATIVE
LDLC SERPL CALC-MCNC: 63 MG/DL (ref 0–100)
LEUKOCYTE ESTERASE UR QL STRIP: NEGATIVE
LYMPHOCYTES # BLD AUTO: 1.33 10*3/MM3 (ref 0.7–3.1)
LYMPHOCYTES NFR BLD AUTO: 18.3 % (ref 19.6–45.3)
MCH RBC QN AUTO: 30.8 PG (ref 26.6–33)
MCHC RBC AUTO-ENTMCNC: 33.2 G/DL (ref 31.5–35.7)
MCV RBC AUTO: 92.7 FL (ref 79–97)
MICRO URNS: ABNORMAL
MICRO URNS: ABNORMAL
MONOCYTES # BLD AUTO: 0.67 10*3/MM3 (ref 0.1–0.9)
MONOCYTES NFR BLD AUTO: 9.2 % (ref 5–12)
NEUTROPHILS # BLD AUTO: 4.99 10*3/MM3 (ref 1.7–7)
NEUTROPHILS NFR BLD AUTO: 68.6 % (ref 42.7–76)
NITRITE UR QL STRIP: NEGATIVE
NRBC BLD AUTO-RTO: 0 /100 WBC (ref 0–0.2)
PH UR STRIP: 5.5 [PH] (ref 5–7.5)
PLATELET # BLD AUTO: 192 10*3/MM3 (ref 140–450)
POTASSIUM SERPL-SCNC: 4.4 MMOL/L (ref 3.5–5.2)
PROT SERPL-MCNC: 6.8 G/DL (ref 6–8.5)
PROT UR QL STRIP: ABNORMAL
PSA SERPL-MCNC: 1.67 NG/ML (ref 0–4)
RBC # BLD AUTO: 4.78 10*6/MM3 (ref 4.14–5.8)
RBC #/AREA URNS HPF: NORMAL /HPF (ref 0–2)
SODIUM SERPL-SCNC: 144 MMOL/L (ref 136–145)
SP GR UR STRIP: >=1.03 (ref 1–1.03)
TRIGL SERPL-MCNC: 62 MG/DL (ref 0–150)
TSH SERPL DL<=0.005 MIU/L-ACNC: 2.11 UIU/ML (ref 0.27–4.2)
URINALYSIS REFLEX: ABNORMAL
UROBILINOGEN UR STRIP-MCNC: 1 MG/DL (ref 0.2–1)
VLDLC SERPL CALC-MCNC: 14 MG/DL (ref 5–40)
WBC # BLD AUTO: 7.27 10*3/MM3 (ref 3.4–10.8)
WBC #/AREA URNS HPF: NORMAL /HPF (ref 0–5)

## 2023-04-25 NOTE — PROGRESS NOTES
Tolerated exercise well, no complaints voiced.   Isotretinoin Counseling: Patient should get monthly blood tests, not donate blood, not drive at night if vision affected, not share medication, and not undergo elective surgery for 6 months after tx completed. Side effects reviewed, pt to contact office should one occur.

## 2023-04-28 ENCOUNTER — OFFICE VISIT (OUTPATIENT)
Dept: INTERNAL MEDICINE | Facility: CLINIC | Age: 88
End: 2023-04-28
Payer: MEDICARE

## 2023-04-28 VITALS
DIASTOLIC BLOOD PRESSURE: 68 MMHG | HEART RATE: 88 BPM | SYSTOLIC BLOOD PRESSURE: 113 MMHG | OXYGEN SATURATION: 98 % | HEIGHT: 70 IN | BODY MASS INDEX: 31.7 KG/M2 | WEIGHT: 221.4 LBS | TEMPERATURE: 97.8 F

## 2023-04-28 DIAGNOSIS — Z00.00 HEALTHCARE MAINTENANCE: Primary | ICD-10-CM

## 2023-04-28 DIAGNOSIS — I10 BENIGN ESSENTIAL HYPERTENSION: ICD-10-CM

## 2023-04-28 DIAGNOSIS — I25.10 ARTERIOSCLEROSIS OF CORONARY ARTERY: ICD-10-CM

## 2023-04-28 DIAGNOSIS — K21.9 GASTROESOPHAGEAL REFLUX DISEASE WITHOUT ESOPHAGITIS: ICD-10-CM

## 2023-04-28 DIAGNOSIS — Z23 NEED FOR VACCINATION: ICD-10-CM

## 2023-04-28 DIAGNOSIS — M25.561 ACUTE PAIN OF RIGHT KNEE: ICD-10-CM

## 2023-04-28 RX ORDER — LANOLIN ALCOHOL/MO/W.PET/CERES
400 CREAM (GRAM) TOPICAL DAILY
COMMUNITY

## 2023-04-28 RX ORDER — IPRATROPIUM BROMIDE 42 UG/1
2 SPRAY, METERED NASAL 2 TIMES DAILY
Qty: 15 ML | Refills: 12 | Status: SHIPPED | OUTPATIENT
Start: 2023-04-28

## 2023-04-28 NOTE — PROGRESS NOTES
The ABCs of the Annual Wellness Visit  Subsequent Medicare Wellness Visit    Subjective    Scar Pérez is a 88 y.o. male who presents for a Subsequent Medicare Wellness Visit.    The following portions of the patient's history were reviewed and   updated as appropriate: allergies, current medications, past family history, past medical history, past social history, past surgical history and problem list.    Compared to one year ago, the patient feels his physical   health is the same.    Compared to one year ago, the patient feels his mental   health is the same.    Recent Hospitalizations:  He was admitted within the past 365 days at [facility].       Current Medical Providers:  Patient Care Team:  Marcelle Fields MD as PCP - General (Internal Medicine)  Sami Fay MD as Consulting Physician (Orthopedic Surgery)  Jeannie Palomo MD (Dermatology)  Yomi Delgadillo DPM as Consulting Physician (Podiatry)  Devon Florez MD (Vascular Surgery)  Dorian Connor MD as Consulting Physician (Cardiology)    Outpatient Medications Prior to Visit   Medication Sig Dispense Refill   • aspirin 325 MG tablet Take 1 tablet by mouth Daily.     • atorvastatin (LIPITOR) 20 MG tablet Take 1 tablet by mouth Daily.     • Cholecalciferol (VITAMIN D3) 5000 UNITS tablet Take by mouth.     • Cyanocobalamin (B-12) 1000 MCG capsule Take 1 capsule by mouth.     • digoxin (LANOXIN) 125 MCG tablet Take 1 tablet by mouth Daily.     • furosemide (LASIX) 40 MG tablet Take 1 tablet by mouth Daily.     • lisinopril (PRINIVIL,ZESTRIL) 2.5 MG tablet Take 1 tablet by mouth Daily.     • Magnesium Oxide 400 (240 Mg) MG tablet Take 1 tablet by mouth Daily.     • Mirabegron ER (MYRBETRIQ) 25 MG tablet sustained-release 24 hour 24 hr tablet Take 1 tablet by mouth Daily.     • niacin 500 MG tablet Take 1 tablet by mouth Daily.     • nitroglycerin (NITROSTAT) 0.4 MG SL tablet Place 1 tablet under the tongue.    "  • pantoprazole (PROTONIX) 40 MG EC tablet Take 1 tablet by mouth 2 (Two) Times a Day. 180 tablet 3   • metoprolol succinate XL (TOPROL-XL) 25 MG 24 hr tablet Take 1 tablet by mouth.     • metoprolol tartrate (Lopressor) 50 MG tablet Take 1 tablet by mouth 2 (Two) Times a Day. 60 tablet 4   • valsartan (Diovan) 80 MG tablet Take 1 tablet by mouth Daily. (Patient taking differently: Take 40 mg by mouth Daily.) 30 tablet 3   • trospium (SANCTURA) 20 MG tablet Take 1 tablet by mouth 2 (Two) Times a Day.       No facility-administered medications prior to visit.       No opioid medication identified on active medication list. I have reviewed chart for other potential  high risk medication/s and harmful drug interactions in the elderly.          Aspirin is on active medication list. Aspirin use is indicated based on review of current medical condition/s. Pros and cons of this therapy have been discussed today. Benefits of this medication outweigh potential harm.  Patient has been encouraged to continue taking this medication.  .      Patient Active Problem List   Diagnosis   • Absolute anemia   • A-fib   • Arteriosclerosis of coronary artery   • Bleeding gastrointestinal   • Lipoma of skin and subcutaneous tissue   • Obstructive apnea   • Hyperkalemia   • Benign essential hypertension   • Pure hypercholesterolemia   • Impaired fasting glucose   • Right upper lobe pneumonia   • Hypopotassemia   • Gastroesophageal reflux disease   • Esophageal dysphagia     Advance Care Planning   Advance Care Planning     Advance Directive is not on file.  ACP discussion was held with the patient during this visit. Patient has an advance directive (not in EMR), copy requested.     Objective    Vitals:    04/28/23 1431   BP: 113/68   Pulse: 88   Temp: 97.8 °F (36.6 °C)   SpO2: 98%   Weight: 100 kg (221 lb 6.4 oz)   Height: 177.8 cm (70\")     Estimated body mass index is 31.77 kg/m² as calculated from the following:    Height as of this " "encounter: 177.8 cm (70\").    Weight as of this encounter: 100 kg (221 lb 6.4 oz).    BMI is >= 30 and <35. (Class 1 Obesity). The following options were offered after discussion;: exercise counseling/recommendations and nutrition counseling/recommendations      Does the patient have evidence of cognitive impairment? No    Lab Results   Component Value Date    CHLPL 110 2023    TRIG 62 2023    HDL 33 (L) 2023    LDL 63 2023    VLDL 14 2023        HEALTH RISK ASSESSMENT    Smoking Status:  Social History     Tobacco Use   Smoking Status Never   Smokeless Tobacco Never     Alcohol Consumption:  Social History     Substance and Sexual Activity   Alcohol Use Yes    Comment: socially     Fall Risk Screen:    STEADI Fall Risk Assessment was completed, and patient is at LOW risk for falls.Assessment completed on:2023    Depression Screenin/28/2023     2:31 PM   PHQ-2/PHQ-9 Depression Screening   Little Interest or Pleasure in Doing Things 0-->not at all   Feeling Down, Depressed or Hopeless 0-->not at all   PHQ-9: Brief Depression Severity Measure Score 0       Health Habits and Functional and Cognitive Screenin/28/2023     2:31 PM   Functional & Cognitive Status   Do you have difficulty preparing food and eating? No   Do you have difficulty bathing yourself, getting dressed or grooming yourself? No   Do you have difficulty using the toilet? No   Do you have difficulty moving around from place to place? Yes   Do you have trouble with steps or getting out of a bed or a chair? Yes   Do you need help using the phone?  No   Are you deaf or do you have serious difficulty hearing?  Yes   Do you need help with transportation? Yes   Do you need help shopping? No   Do you need help preparing meals?  No   Do you need help with housework?  No   Do you need help with laundry? No   Do you need help taking your medications? No   Do you need help managing money? No   Do you ever drive " or ride in a car without wearing a seat belt? No       Age-appropriate Screening Schedule:  Refer to the list below for future screening recommendations based on patient's age, sex and/or medical conditions. Orders for these recommended tests are listed in the plan section. The patient has been provided with a written plan.    Health Maintenance   Topic Date Due   • INFLUENZA VACCINE  08/01/2023   • LIPID PANEL  04/20/2024   • ANNUAL WELLNESS VISIT  04/28/2024   • TDAP/TD VACCINES (5 - Td or Tdap) 07/31/2027   • COVID-19 Vaccine  Completed   • Pneumococcal Vaccine 65+  Completed   • ZOSTER VACCINE  Completed                  CMS Preventative Services Quick Reference  Risk Factors Identified During Encounter  Immunizations Discussed/Encouraged: COVID19  The above risks/problems have been discussed with the patient.  Pertinent information has been shared with the patient in the After Visit Summary.  An After Visit Summary and PPPS were made available to the patient.    Follow Up:   Next Medicare Wellness visit to be scheduled in 1 year.       Additional E&M Note during same encounter follows:  Patient has multiple medical problems which are significant and separately identifiable that require additional work above and beyond the Medicare Wellness Visit.      Chief Complaint  Medicare Wellness-subsequent    Subjective        HPI  Scar Pérez is also being seen today for knee pain, cad, htn, hyperlipidemia, kenan. Patient reports that his knee has been causing pain. He did go to ortho and had an injection into the knee w some benefit. He has oab w/ urinray incontinence. He notes improvement in nocturia after starting myrbetriq. Worked well until last evening. He tries to avoid caffeine. He does take furosemide dialy with lisinopril and metoprolol 25 mg bid. .   Reviewed cbc, cmp, lipid, tsh, psa, and u/a w/ patient. These are at goal levels for patient.          Objective   Vital Signs:  /68   Pulse 88    "Temp 97.8 °F (36.6 °C)   Ht 177.8 cm (70\")   Wt 100 kg (221 lb 6.4 oz)   SpO2 98%   BMI 31.77 kg/m²     Physical Exam  Vitals and nursing note reviewed.   Constitutional:       Appearance: Normal appearance.   HENT:      Head: Normocephalic and atraumatic.      Right Ear: Tympanic membrane normal.      Left Ear: Tympanic membrane normal.      Nose: Nose normal.      Mouth/Throat:      Mouth: Mucous membranes are moist.   Eyes:      Extraocular Movements: Extraocular movements intact.      Pupils: Pupils are equal, round, and reactive to light.   Cardiovascular:      Rate and Rhythm: Normal rate and regular rhythm.      Pulses: Normal pulses.   Pulmonary:      Effort: Pulmonary effort is normal.      Breath sounds: Normal breath sounds.   Abdominal:      General: Abdomen is flat.   Musculoskeletal:         General: Normal range of motion.      Cervical back: Normal range of motion.   Skin:     General: Skin is warm and dry.      Comments: Diffuse SK   Neurological:      General: No focal deficit present.      Mental Status: He is alert and oriented to person, place, and time.   Psychiatric:         Mood and Affect: Mood normal.         Behavior: Behavior normal.         Thought Content: Thought content normal.         Judgment: Judgment normal.          The following data was reviewed by: Marcelle Fields MD on 04/28/2023:  CMP        7/12/2022    09:49 10/26/2022    08:33 4/20/2023    09:01   CMP   Glucose 101   109   107     BUN 22   18   22     Creatinine 0.93   0.95   0.96     Sodium 140   144   144     Potassium 4.2   4.6   4.4     Chloride 100   105   103     Calcium 9.2   9.4   9.8     Total Protein  6.4   6.8     Albumin  3.60   4.1     Globulin  2.8   2.7     Total Bilirubin  0.9   0.8     Alkaline Phosphatase  68   65     AST (SGOT)  16   18     ALT (SGPT)  17   22     BUN/Creatinine Ratio 24   18.9   22.9       CBC w/diff        6/24/2022    03:22 10/26/2022    08:33 4/20/2023    09:01   CBC w/Diff "   WBC 7.25      7.53   7.27     RBC 4.38      4.60   4.78     Hemoglobin 13.5      14.2   14.7     Hematocrit 41.0      42.7   44.3     MCV 93.6      92.8   92.7     MCH 30.8      30.9   30.8     MCHC 32.9      33.3   33.2     RDW 13.6      12.6   13.6     Platelets 200      202   192     Neutrophil Rel % 59.0      64.1   68.6     Immature Granulocyte Rel % 0.3          Lymphocyte Rel % 23.4      20.1   18.3     Monocyte Rel % 11.7      10.2   9.2     Eosinophil Rel % 5.2      4.6   3.2     Basophil Rel % 0.4      0.7   0.4         This result is from an external source.     Lipid Panel        4/20/2023    09:01   Lipid Panel   Total Cholesterol 110     Triglycerides 62     HDL Cholesterol 33     VLDL Cholesterol 14     LDL Cholesterol  63       TSH        4/20/2023    09:01   TSH   TSH 2.110       PSA        4/20/2023    09:01   PSA   PSA 1.670       Data reviewed: Consultant notes cardiology           Assessment and Plan   Diagnoses and all orders for this visit:    1. Healthcare maintenance (Primary)    2. Need for vaccination  -     COVID-19 (Pfizer) Bivalent 12+yrs    3. Benign essential hypertension    4. Arteriosclerosis of coronary artery    5. Gastroesophageal reflux disease without esophagitis    Other orders  -     ipratropium (ATROVENT) 0.06 % nasal spray; 2 sprays into the nostril(s) as directed by provider 2 (Two) Times a Day.  Dispense: 15 mL; Refill: 12      Patient w/ htn, cad, kenan. C/o occasional cough. No coughing througout visit today. Will try ipratroprium nasal given history of sneezing. He will monitor bp and maitain a diet aht is low in caffeine, ethoh, and sodium. He will f/u w/ all specialists routinely. He is not having any palpitations. He will take all meds for bp and lipid management. He will f/u here in 6 months or prn.        I spent 40 minutes caring for Scar on this date of service. This time includes time spent by me in the following activities:preparing for the visit, reviewing  tests, obtaining and/or reviewing a separately obtained history, performing a medically appropriate examination and/or evaluation , counseling and educating the patient/family/caregiver, ordering medications, tests, or procedures, referring and communicating with other health care professionals , documenting information in the medical record and independently interpreting results and communicating that information with the patient/family/caregiver  Follow Up   No follow-ups on file.  Patient was given instructions and counseling regarding his condition or for health maintenance advice. Please see specific information pulled into the AVS if appropriate.

## 2023-05-16 ENCOUNTER — TREATMENT (OUTPATIENT)
Dept: PHYSICAL THERAPY | Facility: CLINIC | Age: 88
End: 2023-05-16
Payer: MEDICARE

## 2023-05-16 DIAGNOSIS — M25.561 ACUTE PAIN OF RIGHT KNEE: ICD-10-CM

## 2023-05-16 NOTE — PROGRESS NOTES
Patient arrived for PT session stating his (R) knee symptoms have resolved approximately 2 weeks following injection.  He states he has resumed his normal functional activity level including ability to squat and kneel.  He demonstrates transition into and out of full kneeling position in clinic independently.  PT offers patient one visit to establish HEP for his knee, and patient states he already has an HEP for his knee.  It is determined that PT services are not necessary in this case.

## 2023-06-13 NOTE — PROGRESS NOTES
Caller: Sidra Goodman    Relationship: Self    Best call back number: [phone]     What is the best time to reach you: ANYTIME    Who are you requesting to speak with (clinical staff, provider,  specific staff member): CLINICAL        What was the call regarding: SIDRA DID A VIRTUAL VISIT YESTERDAY AND WENT TO PICKUP HER ADDERALL AT THE PHARMACY BUT IT WASN'T THERE. SHE WOULD LIKE TO KNOW IF SHE SHOULD TAKE HER OTHER MEDICATION TODAY BECAUSE SHE DOESN'T HAVE THE ADDERALL.    Is it okay if the provider responds through [employer]: NO      PLEASE CALL BACK   Patient tolerated exercise without complaints.

## 2023-10-09 ENCOUNTER — OFFICE VISIT (OUTPATIENT)
Dept: INTERNAL MEDICINE | Facility: CLINIC | Age: 88
End: 2023-10-09
Payer: MEDICARE

## 2023-10-09 VITALS
DIASTOLIC BLOOD PRESSURE: 82 MMHG | OXYGEN SATURATION: 98 % | HEIGHT: 70 IN | HEART RATE: 68 BPM | TEMPERATURE: 97.6 F | BODY MASS INDEX: 32.07 KG/M2 | WEIGHT: 224 LBS | SYSTOLIC BLOOD PRESSURE: 130 MMHG

## 2023-10-09 DIAGNOSIS — R91.1 LUNG NODULE: ICD-10-CM

## 2023-10-09 DIAGNOSIS — U07.1 COVID-19 VIRUS DETECTED: ICD-10-CM

## 2023-10-09 DIAGNOSIS — R09.81 NASAL CONGESTION: Primary | ICD-10-CM

## 2023-10-09 LAB
EXPIRATION DATE: ABNORMAL
INTERNAL CONTROL: ABNORMAL
Lab: ABNORMAL
SARS-COV-2 AG UPPER RESP QL IA.RAPID: DETECTED

## 2023-10-09 PROCEDURE — 99213 OFFICE O/P EST LOW 20 MIN: CPT | Performed by: INTERNAL MEDICINE

## 2023-10-09 PROCEDURE — 87426 SARSCOV CORONAVIRUS AG IA: CPT | Performed by: INTERNAL MEDICINE

## 2023-10-09 RX ORDER — IPRATROPIUM BROMIDE 42 UG/1
2 SPRAY, METERED NASAL 2 TIMES DAILY
Qty: 15 ML | Refills: 12 | Status: SHIPPED | OUTPATIENT
Start: 2023-10-09

## 2023-10-09 NOTE — PROGRESS NOTES
"Chief Complaint   Patient presents with    Nasal Congestion       History of Present Illness   Scar Pérez is a 89 y.o. male presents for acute care. Patient notes that he started w/ congestion and sore throat about 10 days ago while on a cruise. He has been using atrovent nasal w some benefit. No feverish sensation and has not tested his temp. Notes that his cough \"comes in spells\". Better w/ hydration. No wheezing sensation.       The following portions of the patient's history were reviewed and updated as appropriate: allergies, current medications, past family history, past medical history, past social history, past surgical history and problem list.  Current Outpatient Medications on File Prior to Visit   Medication Sig Dispense Refill    aspirin 325 MG tablet Take 1 tablet by mouth Daily.      atorvastatin (LIPITOR) 20 MG tablet Take 1 tablet by mouth Daily.      Cholecalciferol (VITAMIN D3) 5000 UNITS tablet Take by mouth.      Cyanocobalamin (B-12) 1000 MCG capsule Take 1 capsule by mouth.      ipratropium (ATROVENT) 0.06 % nasal spray 2 sprays into the nostril(s) as directed by provider 2 (Two) Times a Day. 15 mL 12    Magnesium Oxide 400 (240 Mg) MG tablet Take 1 tablet by mouth Daily.      Mirabegron ER (MYRBETRIQ) 25 MG tablet sustained-release 24 hour 24 hr tablet Take 1 tablet by mouth Daily.      niacin 500 MG tablet Take 1 tablet by mouth Daily.      nitroglycerin (NITROSTAT) 0.4 MG SL tablet Place 1 tablet under the tongue.      pantoprazole (PROTONIX) 40 MG EC tablet Take 1 tablet by mouth 2 (Two) Times a Day. 180 tablet 3    valsartan (Diovan) 80 MG tablet Take 1 tablet by mouth Daily. (Patient taking differently: Take 0.5 tablets by mouth Daily.) 30 tablet 3    digoxin (LANOXIN) 125 MCG tablet Take 1 tablet by mouth Daily.      furosemide (LASIX) 40 MG tablet Take 1 tablet by mouth Daily.      lisinopril (PRINIVIL,ZESTRIL) 2.5 MG tablet Take 1 tablet by mouth Daily.       No current " "facility-administered medications on file prior to visit.     Review of Systems   Constitutional:  Positive for fatigue. Negative for chills and fever.   HENT:  Positive for postnasal drip, rhinorrhea, sinus pressure, sinus pain and sore throat.    Eyes: Negative.    Respiratory:  Negative for cough.    Cardiovascular: Negative.    Gastrointestinal: Negative.    Endocrine: Negative.    Genitourinary: Negative.    Musculoskeletal: Negative.    Allergic/Immunologic: Negative.    Neurological: Negative.    Hematological: Negative.    Psychiatric/Behavioral: Negative.         Objective   Physical Exam  Vitals and nursing note reviewed.   Constitutional:       Appearance: Normal appearance. He is well-developed.   HENT:      Head: Normocephalic and atraumatic.      Right Ear: Tympanic membrane and external ear normal.      Left Ear: Tympanic membrane and external ear normal.      Nose: Nose normal.      Mouth/Throat:      Mouth: Mucous membranes are moist.      Comments: Pharyngeal erythema    Eyes:      Pupils: Pupils are equal, round, and reactive to light.   Cardiovascular:      Rate and Rhythm: Normal rate and regular rhythm.      Heart sounds: Normal heart sounds.   Pulmonary:      Effort: Pulmonary effort is normal. No respiratory distress.      Breath sounds: Normal breath sounds.   Abdominal:      Palpations: Abdomen is soft.   Musculoskeletal:         General: Normal range of motion.      Cervical back: Neck supple.   Skin:     General: Skin is warm and dry.   Neurological:      General: No focal deficit present.      Mental Status: He is alert and oriented to person, place, and time.   Psychiatric:         Mood and Affect: Mood normal.         Behavior: Behavior normal.         Thought Content: Thought content normal.         Judgment: Judgment normal.          /82   Pulse 68   Temp 97.6 øF (36.4 øC)   Ht 177.8 cm (70\")   Wt 102 kg (224 lb)   SpO2 98%   BMI 32.14 kg/mý     Assessment & Plan "   Diagnoses and all orders for this visit:    Nasal congestion  -     POCT SARS-CoV-2 Antigen RETA    COVID-19 virus detected        Patient w/ subacute covid-19. He is past period for Paxlovid administration. He is actually having minimal symptoms today and was appropriately vaccinated. He is to continue atrovent nasal. To remain at a distance and masked from others until symptom resolution. If symptoms worsen will call. F/u routinely.   Of note patient had prior lung nodule. Will repeat ct chest but will not take for one month given covid-19 at this time.

## 2023-10-19 DIAGNOSIS — R73.01 IMPAIRED FASTING GLUCOSE: ICD-10-CM

## 2023-10-19 DIAGNOSIS — E78.00 PURE HYPERCHOLESTEROLEMIA: ICD-10-CM

## 2023-10-19 DIAGNOSIS — E87.6 HYPOPOTASSEMIA: ICD-10-CM

## 2023-10-19 DIAGNOSIS — I10 BENIGN ESSENTIAL HYPERTENSION: Primary | ICD-10-CM

## 2023-10-19 DIAGNOSIS — D64.9 ANEMIA, UNSPECIFIED TYPE: ICD-10-CM

## 2023-10-19 DIAGNOSIS — E87.5 HYPERKALEMIA: ICD-10-CM

## 2023-10-26 LAB
ALBUMIN SERPL-MCNC: 3.8 G/DL (ref 3.5–5.2)
ALBUMIN/GLOB SERPL: 1.6 G/DL
ALP SERPL-CCNC: 62 U/L (ref 39–117)
ALT SERPL-CCNC: 24 U/L (ref 1–41)
AST SERPL-CCNC: 19 U/L (ref 1–40)
BASOPHILS # BLD AUTO: 0.04 10*3/MM3 (ref 0–0.2)
BASOPHILS NFR BLD AUTO: 0.5 % (ref 0–1.5)
BILIRUB SERPL-MCNC: 0.8 MG/DL (ref 0–1.2)
BUN SERPL-MCNC: 20 MG/DL (ref 8–23)
BUN/CREAT SERPL: 23.3 (ref 7–25)
CALCIUM SERPL-MCNC: 9.2 MG/DL (ref 8.6–10.5)
CHLORIDE SERPL-SCNC: 101 MMOL/L (ref 98–107)
CHOLEST SERPL-MCNC: 112 MG/DL (ref 0–200)
CO2 SERPL-SCNC: 31.9 MMOL/L (ref 22–29)
CREAT SERPL-MCNC: 0.86 MG/DL (ref 0.76–1.27)
EGFRCR SERPLBLD CKD-EPI 2021: 82.8 ML/MIN/1.73
EOSINOPHIL # BLD AUTO: 0.26 10*3/MM3 (ref 0–0.4)
EOSINOPHIL NFR BLD AUTO: 3.3 % (ref 0.3–6.2)
ERYTHROCYTE [DISTWIDTH] IN BLOOD BY AUTOMATED COUNT: 13.1 % (ref 12.3–15.4)
GLOBULIN SER CALC-MCNC: 2.4 GM/DL
GLUCOSE SERPL-MCNC: 107 MG/DL (ref 65–99)
HBA1C MFR BLD: 5.8 % (ref 4.8–5.6)
HCT VFR BLD AUTO: 42.9 % (ref 37.5–51)
HDLC SERPL-MCNC: 32 MG/DL (ref 40–60)
HGB BLD-MCNC: 14.4 G/DL (ref 13–17.7)
IMM GRANULOCYTES # BLD AUTO: 0.02 10*3/MM3 (ref 0–0.05)
IMM GRANULOCYTES NFR BLD AUTO: 0.3 % (ref 0–0.5)
LDLC SERPL CALC-MCNC: 65 MG/DL (ref 0–100)
LYMPHOCYTES # BLD AUTO: 1.61 10*3/MM3 (ref 0.7–3.1)
LYMPHOCYTES NFR BLD AUTO: 20.7 % (ref 19.6–45.3)
MCH RBC QN AUTO: 31.4 PG (ref 26.6–33)
MCHC RBC AUTO-ENTMCNC: 33.6 G/DL (ref 31.5–35.7)
MCV RBC AUTO: 93.5 FL (ref 79–97)
MONOCYTES # BLD AUTO: 0.79 10*3/MM3 (ref 0.1–0.9)
MONOCYTES NFR BLD AUTO: 10.2 % (ref 5–12)
NEUTROPHILS # BLD AUTO: 5.05 10*3/MM3 (ref 1.7–7)
NEUTROPHILS NFR BLD AUTO: 65 % (ref 42.7–76)
NRBC BLD AUTO-RTO: 0.1 /100 WBC (ref 0–0.2)
PLATELET # BLD AUTO: 188 10*3/MM3 (ref 140–450)
POTASSIUM SERPL-SCNC: 4.2 MMOL/L (ref 3.5–5.2)
PROT SERPL-MCNC: 6.2 G/DL (ref 6–8.5)
RBC # BLD AUTO: 4.59 10*6/MM3 (ref 4.14–5.8)
SODIUM SERPL-SCNC: 140 MMOL/L (ref 136–145)
TRIGL SERPL-MCNC: 70 MG/DL (ref 0–150)
VLDLC SERPL CALC-MCNC: 15 MG/DL (ref 5–40)
WBC # BLD AUTO: 7.77 10*3/MM3 (ref 3.4–10.8)

## 2023-10-30 ENCOUNTER — OFFICE VISIT (OUTPATIENT)
Dept: INTERNAL MEDICINE | Facility: CLINIC | Age: 88
End: 2023-10-30
Payer: MEDICARE

## 2023-10-30 VITALS
OXYGEN SATURATION: 95 % | WEIGHT: 210 LBS | BODY MASS INDEX: 30.06 KG/M2 | HEIGHT: 70 IN | DIASTOLIC BLOOD PRESSURE: 70 MMHG | HEART RATE: 76 BPM | SYSTOLIC BLOOD PRESSURE: 120 MMHG

## 2023-10-30 DIAGNOSIS — I10 BENIGN ESSENTIAL HYPERTENSION: Primary | ICD-10-CM

## 2023-10-30 DIAGNOSIS — R73.01 IMPAIRED FASTING GLUCOSE: ICD-10-CM

## 2023-10-30 DIAGNOSIS — E78.00 PURE HYPERCHOLESTEROLEMIA: ICD-10-CM

## 2023-10-30 PROCEDURE — 99213 OFFICE O/P EST LOW 20 MIN: CPT | Performed by: INTERNAL MEDICINE

## 2023-10-30 NOTE — PROGRESS NOTES
Chief Complaint   Patient presents with    Hypertension    Hyperlipidemia       Hypertension    Hyperlipidemia       Scar Pérez is a 89 y.o. male presents for routine f/u evaluation. Patient reports that he is doing well today. He has no new symptoms to report today. He notes that symptom of needing to take a deep breath has resolved. BP has been normotensive. No episodes of hyper or hypotension on current meds.   CBC, CMP, lipid,. Hgb A1c reviewed and all at goal at this time.     The following portions of the patient's history were reviewed and updated as appropriate: allergies, current medications, past family history, past medical history, past social history, past surgical history and problem list.  Current Outpatient Medications on File Prior to Visit   Medication Sig Dispense Refill    aspirin 325 MG tablet Take 1 tablet by mouth Daily.      atorvastatin (LIPITOR) 20 MG tablet Take 1 tablet by mouth Daily.      Cholecalciferol (VITAMIN D3) 5000 UNITS tablet Take by mouth.      Cyanocobalamin (B-12) 1000 MCG capsule Take 1 capsule by mouth.      ipratropium (ATROVENT) 0.06 % nasal spray 2 sprays into the nostril(s) as directed by provider 2 (Two) Times a Day. 15 mL 12    Magnesium Oxide 400 (240 Mg) MG tablet Take 1 tablet by mouth Daily.      Mirabegron ER (MYRBETRIQ) 25 MG tablet sustained-release 24 hour 24 hr tablet Take 1 tablet by mouth Daily.      niacin 500 MG tablet Take 1 tablet by mouth Daily.      nitroglycerin (NITROSTAT) 0.4 MG SL tablet Place 1 tablet under the tongue.      pantoprazole (PROTONIX) 40 MG EC tablet Take 1 tablet by mouth 2 (Two) Times a Day. 180 tablet 3    valsartan (Diovan) 80 MG tablet Take 1 tablet by mouth Daily. (Patient taking differently: Take 0.5 tablets by mouth Daily.) 30 tablet 3    digoxin (LANOXIN) 125 MCG tablet Take 1 tablet by mouth Daily.      furosemide (LASIX) 40 MG tablet Take 1 tablet by mouth Daily.      lisinopril (PRINIVIL,ZESTRIL) 2.5 MG tablet  "Take 1 tablet by mouth Daily.       No current facility-administered medications on file prior to visit.     Review of Systems   Constitutional: Negative.    HENT: Negative.     Eyes: Negative.    Respiratory: Negative.     Cardiovascular: Negative.    Gastrointestinal: Negative.    Endocrine: Negative.    Genitourinary: Negative.    Musculoskeletal: Negative.    Skin: Negative.    Allergic/Immunologic: Negative.    Neurological: Negative.    Hematological: Negative.    Psychiatric/Behavioral: Negative.         Objective   Physical Exam  Vitals and nursing note reviewed.   Constitutional:       Appearance: Normal appearance. He is well-developed.   HENT:      Head: Normocephalic and atraumatic.      Right Ear: Tympanic membrane and external ear normal.      Left Ear: Tympanic membrane and external ear normal.      Nose: Nose normal.      Mouth/Throat:      Mouth: Mucous membranes are moist.   Eyes:      Extraocular Movements: Extraocular movements intact.      Pupils: Pupils are equal, round, and reactive to light.   Cardiovascular:      Rate and Rhythm: Normal rate and regular rhythm.      Pulses: Normal pulses.      Heart sounds: Normal heart sounds.   Pulmonary:      Effort: Pulmonary effort is normal. No respiratory distress.      Breath sounds: Normal breath sounds.   Abdominal:      General: Abdomen is flat.      Palpations: Abdomen is soft.   Musculoskeletal:         General: Normal range of motion.      Cervical back: Normal range of motion and neck supple.   Skin:     General: Skin is warm and dry.   Neurological:      General: No focal deficit present.      Mental Status: He is alert and oriented to person, place, and time.   Psychiatric:         Mood and Affect: Mood normal.         Behavior: Behavior normal.         Thought Content: Thought content normal.         Judgment: Judgment normal.          /70   Pulse 76   Ht 177.8 cm (70\")   Wt 95.3 kg (210 lb)   SpO2 95%   BMI 30.13 kg/m² "     Assessment & Plan   Diagnoses and all orders for this visit:    Benign essential hypertension    Impaired fasting glucose    Pure hypercholesterolemia        Patient w/ hyperlipidemia. He will adhere to a low fat diet. Continue current meds. Low sodium nutrtion and monitor bp regularly. He is to work on maximizing activity as tolerated. To try stepping in place, etc. Monitor dietary glucose. Labs reviewed. F/u in 7 months for awv.

## 2023-11-09 ENCOUNTER — HOSPITAL ENCOUNTER (OUTPATIENT)
Facility: HOSPITAL | Age: 88
Discharge: HOME | End: 2023-11-09
Admitting: INTERNAL MEDICINE
Payer: MEDICARE

## 2023-11-09 DIAGNOSIS — R91.1 LUNG NODULE: ICD-10-CM

## 2023-11-09 PROCEDURE — 71250 CT THORAX DX C-: CPT

## 2023-11-10 ENCOUNTER — TELEPHONE (OUTPATIENT)
Dept: INTERNAL MEDICINE | Facility: CLINIC | Age: 88
End: 2023-11-10
Payer: MEDICARE

## 2023-11-10 NOTE — TELEPHONE ENCOUNTER
Pt informed    ----- Message from Marcelle Fields MD sent at 11/10/2023  4:38 PM EST -----  Stable lung nodules. May repeat in one year.   jw

## 2024-04-11 DIAGNOSIS — I10 BENIGN ESSENTIAL HYPERTENSION: Primary | ICD-10-CM

## 2024-04-11 DIAGNOSIS — E78.00 PURE HYPERCHOLESTEROLEMIA: ICD-10-CM

## 2024-04-11 DIAGNOSIS — R73.01 IMPAIRED FASTING GLUCOSE: ICD-10-CM

## 2024-04-11 DIAGNOSIS — D50.9 IRON DEFICIENCY ANEMIA, UNSPECIFIED IRON DEFICIENCY ANEMIA TYPE: ICD-10-CM

## 2024-04-11 DIAGNOSIS — E87.6 HYPOPOTASSEMIA: ICD-10-CM

## 2024-04-27 LAB
ALBUMIN SERPL-MCNC: 3.7 G/DL (ref 3.5–5.2)
ALBUMIN/GLOB SERPL: 1.4 G/DL
ALP SERPL-CCNC: 65 U/L (ref 39–117)
ALT SERPL-CCNC: 20 U/L (ref 1–41)
APPEARANCE UR: CLEAR
AST SERPL-CCNC: 16 U/L (ref 1–40)
BACTERIA #/AREA URNS HPF: NORMAL /[HPF]
BASOPHILS # BLD AUTO: 0.04 10*3/MM3 (ref 0–0.2)
BASOPHILS NFR BLD AUTO: 0.6 % (ref 0–1.5)
BILIRUB SERPL-MCNC: 1 MG/DL (ref 0–1.2)
BILIRUB UR QL STRIP: NORMAL
BUN SERPL-MCNC: 19 MG/DL (ref 8–23)
BUN/CREAT SERPL: 22.4 (ref 7–25)
CALCIUM SERPL-MCNC: 9.5 MG/DL (ref 8.6–10.5)
CASTS URNS QL MICRO: NORMAL /LPF
CHLORIDE SERPL-SCNC: 101 MMOL/L (ref 98–107)
CHOLEST SERPL-MCNC: 111 MG/DL (ref 0–200)
CO2 SERPL-SCNC: 30.7 MMOL/L (ref 22–29)
COLOR UR: YELLOW
CREAT SERPL-MCNC: 0.85 MG/DL (ref 0.76–1.27)
EGFRCR SERPLBLD CKD-EPI 2021: 83.1 ML/MIN/1.73
EOSINOPHIL # BLD AUTO: 0.26 10*3/MM3 (ref 0–0.4)
EOSINOPHIL NFR BLD AUTO: 4 % (ref 0.3–6.2)
EPI CELLS #/AREA URNS HPF: NORMAL /HPF (ref 0–10)
ERYTHROCYTE [DISTWIDTH] IN BLOOD BY AUTOMATED COUNT: 12.9 % (ref 12.3–15.4)
GLOBULIN SER CALC-MCNC: 2.6 GM/DL
GLUCOSE SERPL-MCNC: 103 MG/DL (ref 65–99)
GLUCOSE UR QL STRIP: NEGATIVE
HBA1C MFR BLD: 5.8 % (ref 4.8–5.6)
HCT VFR BLD AUTO: 41.3 % (ref 37.5–51)
HDLC SERPL-MCNC: 33 MG/DL (ref 40–60)
HGB BLD-MCNC: 13.5 G/DL (ref 13–17.7)
HGB UR QL STRIP: NEGATIVE
IMM GRANULOCYTES # BLD AUTO: 0.01 10*3/MM3 (ref 0–0.05)
IMM GRANULOCYTES NFR BLD AUTO: 0.2 % (ref 0–0.5)
KETONES UR QL STRIP: NEGATIVE
LDLC SERPL CALC-MCNC: 63 MG/DL (ref 0–100)
LEUKOCYTE ESTERASE UR QL STRIP: NEGATIVE
LYMPHOCYTES # BLD AUTO: 1.46 10*3/MM3 (ref 0.7–3.1)
LYMPHOCYTES NFR BLD AUTO: 22.7 % (ref 19.6–45.3)
MCH RBC QN AUTO: 29.9 PG (ref 26.6–33)
MCHC RBC AUTO-ENTMCNC: 32.7 G/DL (ref 31.5–35.7)
MCV RBC AUTO: 91.6 FL (ref 79–97)
MICRO URNS: NORMAL
MICRO URNS: NORMAL
MONOCYTES # BLD AUTO: 0.66 10*3/MM3 (ref 0.1–0.9)
MONOCYTES NFR BLD AUTO: 10.3 % (ref 5–12)
NEUTROPHILS # BLD AUTO: 4 10*3/MM3 (ref 1.7–7)
NEUTROPHILS NFR BLD AUTO: 62.2 % (ref 42.7–76)
NITRITE UR QL STRIP: NEGATIVE
NRBC BLD AUTO-RTO: 0 /100 WBC (ref 0–0.2)
PH UR STRIP: 6.5 [PH] (ref 5–7.5)
PLATELET # BLD AUTO: 197 10*3/MM3 (ref 140–450)
POTASSIUM SERPL-SCNC: 4.1 MMOL/L (ref 3.5–5.2)
PROT SERPL-MCNC: 6.3 G/DL (ref 6–8.5)
PROT UR QL STRIP: NORMAL
RBC # BLD AUTO: 4.51 10*6/MM3 (ref 4.14–5.8)
RBC #/AREA URNS HPF: NORMAL /HPF (ref 0–2)
SODIUM SERPL-SCNC: 141 MMOL/L (ref 136–145)
SP GR UR STRIP: 1.03 (ref 1–1.03)
TRIGL SERPL-MCNC: 74 MG/DL (ref 0–150)
TSH SERPL DL<=0.005 MIU/L-ACNC: 1.69 UIU/ML (ref 0.27–4.2)
URINALYSIS REFLEX: NORMAL
UROBILINOGEN UR STRIP-MCNC: 1 MG/DL (ref 0.2–1)
VLDLC SERPL CALC-MCNC: 15 MG/DL (ref 5–40)
WBC # BLD AUTO: 6.43 10*3/MM3 (ref 3.4–10.8)
WBC #/AREA URNS HPF: NORMAL /HPF (ref 0–5)

## 2024-04-30 ENCOUNTER — TELEPHONE (OUTPATIENT)
Dept: INTERNAL MEDICINE | Facility: CLINIC | Age: 89
End: 2024-04-30
Payer: MEDICARE

## 2024-04-30 ENCOUNTER — HOSPITAL ENCOUNTER (OUTPATIENT)
Facility: HOSPITAL | Age: 89
Discharge: HOME | End: 2024-04-30
Admitting: INTERNAL MEDICINE
Payer: MEDICARE

## 2024-04-30 ENCOUNTER — OFFICE VISIT (OUTPATIENT)
Dept: INTERNAL MEDICINE | Facility: CLINIC | Age: 89
End: 2024-04-30
Payer: MEDICARE

## 2024-04-30 VITALS
SYSTOLIC BLOOD PRESSURE: 134 MMHG | BODY MASS INDEX: 32.21 KG/M2 | OXYGEN SATURATION: 96 % | DIASTOLIC BLOOD PRESSURE: 86 MMHG | WEIGHT: 225 LBS | HEIGHT: 70 IN | HEART RATE: 73 BPM

## 2024-04-30 DIAGNOSIS — R05.2 SUBACUTE COUGH: ICD-10-CM

## 2024-04-30 DIAGNOSIS — R05.2 SUBACUTE COUGH: Primary | ICD-10-CM

## 2024-04-30 PROCEDURE — G0439 PPPS, SUBSEQ VISIT: HCPCS | Performed by: INTERNAL MEDICINE

## 2024-04-30 PROCEDURE — 71046 X-RAY EXAM CHEST 2 VIEWS: CPT

## 2024-04-30 PROCEDURE — 1170F FXNL STATUS ASSESSED: CPT | Performed by: INTERNAL MEDICINE

## 2024-04-30 PROCEDURE — 99214 OFFICE O/P EST MOD 30 MIN: CPT | Performed by: INTERNAL MEDICINE

## 2024-04-30 RX ORDER — LEVOCETIRIZINE DIHYDROCHLORIDE 5 MG/1
5 TABLET, FILM COATED ORAL EVERY EVENING
Qty: 30 TABLET | Refills: 5 | Status: SHIPPED | OUTPATIENT
Start: 2024-04-30

## 2024-04-30 RX ORDER — FLUTICASONE PROPIONATE 50 MCG
2 SPRAY, SUSPENSION (ML) NASAL DAILY
Qty: 16 G | Refills: 5 | Status: SHIPPED | OUTPATIENT
Start: 2024-04-30

## 2024-04-30 RX ORDER — BENZONATATE 100 MG/1
100 CAPSULE ORAL 3 TIMES DAILY PRN
Qty: 30 CAPSULE | Refills: 3 | Status: SHIPPED | OUTPATIENT
Start: 2024-04-30

## 2024-04-30 NOTE — PROGRESS NOTES
The ABCs of the Annual Wellness Visit  Subsequent Medicare Wellness Visit    Subjective    Scar Pérez is a 89 y.o. male who presents for a Subsequent Medicare Wellness Visit.    The following portions of the patient's history were reviewed and   updated as appropriate: allergies, current medications, past family history, past medical history, past social history, past surgical history, and problem list.    Compared to one year ago, the patient feels his physical   health is the same.    Compared to one year ago, the patient feels his mental   health is the same.    Recent Hospitalizations:  He was not admitted to the hospital during the last year.       Current Medical Providers:  Patient Care Team:  Marcelle Fields MD as PCP - General (Internal Medicine)  Sami Fay MD (Inactive) as Consulting Physician (Orthopedic Surgery)  Jeannie Palomo MD (Dermatology)  Yomi Delgadillo DPM as Consulting Physician (Podiatry)  Devon Florez MD (Vascular Surgery)  Dorian Connor MD as Consulting Physician (Cardiology)    Outpatient Medications Prior to Visit   Medication Sig Dispense Refill    aspirin 325 MG tablet Take 1 tablet by mouth Daily.      atorvastatin (LIPITOR) 20 MG tablet Take 1 tablet by mouth Daily.      Cholecalciferol (VITAMIN D3) 5000 UNITS tablet Take by mouth.      Cyanocobalamin (B-12) 1000 MCG capsule Take 1 capsule by mouth.      digoxin (LANOXIN) 125 MCG tablet Take 1 tablet by mouth Daily.      furosemide (LASIX) 40 MG tablet Take 1 tablet by mouth Daily.      ipratropium (ATROVENT) 0.06 % nasal spray 2 sprays into the nostril(s) as directed by provider 2 (Two) Times a Day. 15 mL 12    lisinopril (PRINIVIL,ZESTRIL) 2.5 MG tablet Take 1 tablet by mouth Daily.      Magnesium Oxide 400 (240 Mg) MG tablet Take 1 tablet by mouth Daily.      Mirabegron ER (MYRBETRIQ) 25 MG tablet sustained-release 24 hour 24 hr tablet Take 1 tablet by mouth Daily.      niacin  "500 MG tablet Take 1 tablet by mouth Daily.      nitroglycerin (NITROSTAT) 0.4 MG SL tablet Place 1 tablet under the tongue.      pantoprazole (PROTONIX) 40 MG EC tablet Take 1 tablet by mouth 2 (Two) Times a Day. 180 tablet 3    valsartan (Diovan) 80 MG tablet Take 1 tablet by mouth Daily. (Patient taking differently: Take 0.5 tablets by mouth Daily.) 30 tablet 3     No facility-administered medications prior to visit.       No opioid medication identified on active medication list. I have reviewed chart for other potential  high risk medication/s and harmful drug interactions in the elderly.        Aspirin is on active medication list. Aspirin use is indicated based on review of current medical condition/s. Pros and cons of this therapy have been discussed today. Benefits of this medication outweigh potential harm.  Patient has been encouraged to continue taking this medication.  .      Patient Active Problem List   Diagnosis    Absolute anemia    A-fib    Arteriosclerosis of coronary artery    Bleeding gastrointestinal    Lipoma of skin and subcutaneous tissue    Obstructive apnea    Hyperkalemia    Benign essential hypertension    Pure hypercholesterolemia    Impaired fasting glucose    Right upper lobe pneumonia    Hypopotassemia    Gastroesophageal reflux disease    Esophageal dysphagia     Advance Care Planning   Advance Care Planning     Advance Directive is not on file.  ACP discussion was held with the patient during this visit. Patient has an advance directive (not in EMR), copy requested.     Objective    Vitals:    04/30/24 0912   BP: 134/86   Pulse: 73   SpO2: 96%   Weight: 102 kg (225 lb)   Height: 177.8 cm (70\")     Estimated body mass index is 32.28 kg/m² as calculated from the following:    Height as of this encounter: 177.8 cm (70\").    Weight as of this encounter: 102 kg (225 lb).    BMI is >= 30 and <35. (Class 1 Obesity). The following options were offered after discussion;: exercise " counseling/recommendations and nutrition counseling/recommendations      Does the patient have evidence of cognitive impairment? No    Lab Results   Component Value Date    CHLPL 111 2024    TRIG 74 2024    HDL 33 (L) 2024    LDL 63 2024    VLDL 15 2024    HGBA1C 5.80 (H) 2024        HEALTH RISK ASSESSMENT    Smoking Status:  Social History     Tobacco Use   Smoking Status Never   Smokeless Tobacco Never     Alcohol Consumption:  Social History     Substance and Sexual Activity   Alcohol Use Yes    Comment: socially     Fall Risk Screen:    STEADI Fall Risk Assessment was completed, and patient is at LOW risk for falls.Assessment completed on:2024    Depression Screenin/30/2024     9:12 AM   PHQ-2/PHQ-9 Depression Screening   Little Interest or Pleasure in Doing Things 0-->not at all   Feeling Down, Depressed or Hopeless 0-->not at all   PHQ-9: Brief Depression Severity Measure Score 0       Health Habits and Functional and Cognitive Screenin/30/2024     9:13 AM   Functional & Cognitive Status   Do you have difficulty preparing food and eating? No   Do you have difficulty bathing yourself, getting dressed or grooming yourself? No   Do you have difficulty using the toilet? No   Do you have difficulty moving around from place to place? No   Do you have trouble with steps or getting out of a bed or a chair? No   Dental Exam Up to date   Eye Exam Up to date   Do you need help using the phone?  No   Are you deaf or do you have serious difficulty hearing?  Yes   Do you need help to go to places out of walking distance? No   Do you need help shopping? No   Do you need help preparing meals?  No   Do you need help with housework?  No   Do you need help with laundry? No   Do you need help taking your medications? No   Do you need help managing money? No   Do you ever drive or ride in a car without wearing a seat belt? No   Have you felt unusual stress, anger or  loneliness in the last month? No   Who do you live with? Spouse   If you need help, do you have trouble finding someone available to you? No   Have you been bothered in the last four weeks by sexual problems? No   Do you have difficulty concentrating, remembering or making decisions? No       Age-appropriate Screening Schedule:  Refer to the list below for future screening recommendations based on patient's age, sex and/or medical conditions. Orders for these recommended tests are listed in the plan section. The patient has been provided with a written plan.    Health Maintenance   Topic Date Due    RSV Vaccine - Adults (1 - 1-dose 60+ series) Never done    COVID-19 Vaccine (7 - 2023-24 season) 09/01/2023    ANNUAL WELLNESS VISIT  04/28/2024    BMI FOLLOWUP  04/28/2024    INFLUENZA VACCINE  08/01/2024    LIPID PANEL  04/26/2025    TDAP/TD VACCINES (6 - Td or Tdap) 11/29/2033    Pneumococcal Vaccine 65+  Completed    ZOSTER VACCINE  Completed                  CMS Preventative Services Quick Reference  Risk Factors Identified During Encounter  Immunizations Discussed/Encouraged: RSV COVID 19    The above risks/problems have been discussed with the patient.  Pertinent information has been shared with the patient in the After Visit Summary.  An After Visit Summary and PPPS were made available to the patient.    Follow Up:   Next Medicare Wellness visit to be scheduled in 1 year.       Additional E&M Note during same encounter follows:  Patient has multiple medical problems which are significant and separately identifiable that require additional work above and beyond the Medicare Wellness Visit.      Chief Complaint  Annual Exam  Cough  Hyperlipidemia  Hypertension  Cad  Afib-paced        Subjective        HPI  Scar Pérez is also being seen today for cough. Symptoms started 3 weeks ago. To urgent care and was advised his allergies had increased. He is now using ipratroprium in a prn fashion.   Patient has  "hypertension. Bp is normotensive today. Patient reports taking \"an arthritis pill\".   Pateint had recent blood flow tested and does have left lower extremity peripheral arteral disease (moderate)  Reports good activity tolerance. Occasional dyspnea when seated.          Objective   Vital Signs:  /86   Pulse 73   Ht 177.8 cm (70\")   Wt 102 kg (225 lb)   SpO2 96%   BMI 32.28 kg/m²     Physical Exam  Vitals and nursing note reviewed.   Constitutional:       Appearance: Normal appearance.   HENT:      Head: Normocephalic and atraumatic.      Right Ear: Tympanic membrane normal.      Left Ear: Tympanic membrane normal.      Nose: Nose normal.      Mouth/Throat:      Mouth: Mucous membranes are moist.   Eyes:      Extraocular Movements: Extraocular movements intact.      Pupils: Pupils are equal, round, and reactive to light.   Cardiovascular:      Rate and Rhythm: Normal rate and regular rhythm.      Pulses: Normal pulses.      Heart sounds: Normal heart sounds.   Pulmonary:      Effort: Pulmonary effort is normal.      Breath sounds: Normal breath sounds.   Abdominal:      General: Abdomen is flat.      Palpations: Abdomen is soft.   Musculoskeletal:         General: Normal range of motion.      Cervical back: Normal range of motion.   Skin:     General: Skin is warm and dry.   Neurological:      General: No focal deficit present.      Mental Status: He is alert and oriented to person, place, and time.   Psychiatric:         Mood and Affect: Mood normal.         Behavior: Behavior normal.         Thought Content: Thought content normal.         Judgment: Judgment normal.        The following data was reviewed by: Marcelle Fields MD on 04/30/2024:  CMP          10/26/2023    08:17 4/26/2024    08:34   CMP   Glucose 107  103    BUN 20  19    Creatinine 0.86  0.85    Sodium 140  141    Potassium 4.2  4.1    Chloride 101  101    Calcium 9.2  9.5    Total Protein 6.2  6.3    Albumin 3.8  3.7    Globulin 2.4  2.6  "   Total Bilirubin 0.8  1.0    Alkaline Phosphatase 62  65    AST (SGOT) 19  16    ALT (SGPT) 24  20    BUN/Creatinine Ratio 23.3  22.4      CBC w/diff          5/19/2023    08:58 10/26/2023    08:17 4/26/2024    08:34   CBC w/Diff   WBC 8.05     7.77  6.43    RBC 5.09     4.59  4.51    Hemoglobin 15.7     14.4  13.5    Hematocrit 50.1     42.9  41.3    MCV 98.4     93.5  91.6    MCH 30.8     31.4  29.9    MCHC 31.3     33.6  32.7    RDW 15.3     13.1  12.9    Platelets 213     188  197    Neutrophil Rel % 65.7     65.0  62.2    Immature Granulocyte Rel % 0.4         Lymphocyte Rel % 21.2     20.7  22.7    Monocyte Rel % 8.1     10.2  10.3    Eosinophil Rel % 4.2     3.3  4.0    Basophil Rel % 0.4     0.5  0.6       Details          This result is from an external source.             Lipid Panel          10/26/2023    08:17 4/26/2024    08:34   Lipid Panel   Total Cholesterol 112  111    Triglycerides 70  74    HDL Cholesterol 32  33    VLDL Cholesterol 15  15    LDL Cholesterol  65  63      TSH          4/26/2024    08:34   TSH   TSH 1.690      HgB          5/19/2023    08:58 10/26/2023    08:17 4/26/2024    08:34   HGB   Hemoglobin 15.7     14.4  13.5       Details          This result is from an external source.             UA          4/26/2024    08:34   Urinalysis   Blood, UA Negative    Leukocytes, UA Negative    Nitrite, UA Negative    RBC, UA 0-2    Bacteria, UA None seen      Data reviewed : Consultant notes vascular           Assessment and Plan   There are no diagnoses linked to this encounter.       I spent 44 minutes caring for Scar on this date of service. This time includes time spent by me in the following activities:preparing for the visit, reviewing tests, obtaining and/or reviewing a separately obtained history, performing a medically appropriate examination and/or evaluation , counseling and educating the patient/family/caregiver, ordering medications, tests, or procedures, referring and  communicating with other health care professionals , documenting information in the medical record, and independently interpreting results and communicating that information with the patient/family/caregiver  Follow Up   No follow-ups on file.  Patient was given instructions and counseling regarding his condition or for health maintenance advice. Please see specific information pulled into the AVS if appropriate.       -cough- patient has a history of pneumonia. However, no fever and did not have a cough throughout his visit. Does not appear ill at this time. Will try xyzal and flonse with prn ipratroprium.   -afib- patient is now paced. He is on asa w/ bruising but otherwise tolerated. Rate is regular.   -PAD- to continue asa. Lipids reviewed and will remain on antihyperlipidemic. Encouraged heatlhy activity.   -htn- bp normotensive today.   - hyperlip- continue statin

## 2024-04-30 NOTE — TELEPHONE ENCOUNTER
Pt informed    ----- Message from Lila HICKS sent at 4/30/2024  2:08 PM EDT -----  Patient's lungs are clear. No pneumonia. He should call if cough worsens, develops fever, or feels ill.   sylvester

## 2024-04-30 NOTE — TELEPHONE ENCOUNTER
----- Message from Lila HICKS sent at 4/30/2024  2:08 PM EDT -----  Patient's lungs are clear. No pneumonia. He should call if cough worsens, develops fever, or feels ill.   jw

## 2024-09-30 ENCOUNTER — OFFICE VISIT (OUTPATIENT)
Dept: INTERNAL MEDICINE | Facility: CLINIC | Age: 89
End: 2024-09-30
Payer: MEDICARE

## 2024-09-30 VITALS
DIASTOLIC BLOOD PRESSURE: 66 MMHG | HEART RATE: 78 BPM | WEIGHT: 221 LBS | HEIGHT: 70 IN | BODY MASS INDEX: 31.64 KG/M2 | OXYGEN SATURATION: 96 % | SYSTOLIC BLOOD PRESSURE: 120 MMHG

## 2024-09-30 DIAGNOSIS — I26.99 OTHER ACUTE PULMONARY EMBOLISM, UNSPECIFIED WHETHER ACUTE COR PULMONALE PRESENT: Primary | ICD-10-CM

## 2024-09-30 DIAGNOSIS — M79.89 SOFT TISSUE MASS: ICD-10-CM

## 2024-09-30 DIAGNOSIS — M06.321 RHEUMATOID NODULE OF RIGHT ELBOW: ICD-10-CM

## 2024-09-30 PROCEDURE — 1125F AMNT PAIN NOTED PAIN PRSNT: CPT | Performed by: INTERNAL MEDICINE

## 2024-09-30 PROCEDURE — 99214 OFFICE O/P EST MOD 30 MIN: CPT | Performed by: INTERNAL MEDICINE

## 2024-09-30 PROCEDURE — 1159F MED LIST DOCD IN RCRD: CPT | Performed by: INTERNAL MEDICINE

## 2024-09-30 PROCEDURE — 1111F DSCHRG MED/CURRENT MED MERGE: CPT | Performed by: INTERNAL MEDICINE

## 2024-09-30 PROCEDURE — 1160F RVW MEDS BY RX/DR IN RCRD: CPT | Performed by: INTERNAL MEDICINE

## 2024-09-30 NOTE — PROGRESS NOTES
Transitional Care Follow Up Visit  Subjective     Scar Pérez is a 89 y.o. male who presents for a transitional care management visit.    Within 48 business hours after discharge our office contacted him via telephone to coordinate his care and needs.      I reviewed and discussed the details of that call along with the discharge summary, hospital problems, inpatient lab results, inpatient diagnostic studies, and consultation reports with Scar.     Current outpatient and discharge medications have been reconciled for the patient.  Reviewed by: Marcelle Fields MD          1/22/2022     1:31 PM   Date of TCM Phone Call   [facility]   Date of Admission 1/22/2022   Date of Discharge 1/22/2022   Discharge Disposition Home or Self Care     Risk for Readmission (LACE) No data recorded    Hypertension  Associated symptoms include shortness of breath.      Course During Hospital Stay:  ppatient on vacation in [place]. Went [place] and was walking. Felt short of breath and dizzy. To [facility] and sent to er. Tested and found to have PE. He was monitored and started on anitcoagulation. Switched to xarelto. He is now on outpatient xarelto. He was placed on home oxygen. He is no longer requiring this . He has bettye and is on cpap at night. Patient has not had prior blood clot. Formed after long flight.      The following portions of the patient's history were reviewed and updated as appropriate: allergies, current medications, past family history, past medical history, past social history, past surgical history, and problem list.    Review of Systems   Constitutional: Negative.    HENT: Negative.     Eyes: Negative.    Respiratory:  Positive for shortness of breath.    Cardiovascular:  Positive for leg swelling.        Less swelling at this time   Gastrointestinal: Negative.    Endocrine: Negative.    Genitourinary: Negative.    Musculoskeletal:  Positive for arthralgias.   Skin: Negative.   "  Allergic/Immunologic: Negative.    Neurological: Negative.    Hematological:  Bruises/bleeds easily.        Bruising noted on xarelto and aspirin   Psychiatric/Behavioral: Negative.         Objective   /66   Pulse 78   Ht 177.8 cm (70\")   Wt 100 kg (221 lb)   SpO2 96%   BMI 31.71 kg/m²   Physical Exam  Vitals and nursing note reviewed.   Constitutional:       Appearance: Normal appearance.   HENT:      Head: Normocephalic and atraumatic.      Right Ear: Tympanic membrane normal.      Left Ear: Tympanic membrane normal.      Nose: Nose normal.      Mouth/Throat:      Mouth: Mucous membranes are moist. Mucous membranes are dry.   Eyes:      Extraocular Movements: Extraocular movements intact.      Pupils: Pupils are equal, round, and reactive to light.   Cardiovascular:      Rate and Rhythm: Normal rate and regular rhythm.      Pulses: Normal pulses.      Heart sounds: Normal heart sounds.   Pulmonary:      Effort: Pulmonary effort is normal.      Breath sounds: Normal breath sounds.   Abdominal:      General: Abdomen is flat.      Palpations: Abdomen is soft.   Musculoskeletal:         General: Normal range of motion.      Cervical back: Normal range of motion.      Comments: Right elbow soft tissue prominence   Skin:     General: Skin is warm and dry.   Neurological:      General: No focal deficit present.      Mental Status: He is alert and oriented to person, place, and time.   Psychiatric:         Mood and Affect: Mood normal.         Behavior: Behavior normal.         Thought Content: Thought content normal.         Assessment & Plan   Diagnoses and all orders for this visit:    1. Other acute pulmonary embolism, unspecified whether acute cor pulmonale present (Primary)  -     Ambulatory Referral to Hematology    2. Rheumatoid nodule of right elbow    3. Soft tissue mass  -     US Soft Tissue; Future    Other orders  -     rivaroxaban (XARELTO) 20 MG tablet; Take 1 tablet by mouth Daily.  Dispense: " 90 tablet; Refill: 1    Patient w acute pulmonary embolism. Will continue xarelto. He is on asa for cad and was actually on asa when he developed acute pe. Possibly after flight/ provoked. He does not recall a prior pe. Will continue xarelto daily w asa and f/uw hematology to det duration of therapy.   Has soft tissue prominence right elbow. Will get u/s for this.   Patietn will call if increased bleeding/ bruising.   Follow up in October as previously scheduled.   Of note he will d/c niacin daily given cost and questionable efficacy.

## 2024-10-14 ENCOUNTER — HOSPITAL ENCOUNTER (OUTPATIENT)
Dept: ULTRASOUND IMAGING | Facility: HOSPITAL | Age: 89
Discharge: HOME | End: 2024-10-14
Admitting: INTERNAL MEDICINE
Payer: MEDICARE

## 2024-10-14 DIAGNOSIS — M79.89 SOFT TISSUE MASS: ICD-10-CM

## 2024-10-14 PROCEDURE — 76882 US LMTD JT/FCL EVL NVASC XTR: CPT

## 2024-10-17 DIAGNOSIS — E87.5 HYPERKALEMIA: ICD-10-CM

## 2024-10-17 DIAGNOSIS — R73.01 IMPAIRED FASTING GLUCOSE: ICD-10-CM

## 2024-10-17 DIAGNOSIS — E78.00 PURE HYPERCHOLESTEROLEMIA: ICD-10-CM

## 2024-10-17 DIAGNOSIS — I10 BENIGN ESSENTIAL HYPERTENSION: Primary | ICD-10-CM

## 2024-10-17 DIAGNOSIS — E87.6 HYPOPOTASSEMIA: ICD-10-CM

## 2024-10-17 DIAGNOSIS — D50.8 OTHER IRON DEFICIENCY ANEMIA: ICD-10-CM

## 2024-10-22 ENCOUNTER — TELEPHONE (OUTPATIENT)
Dept: INTERNAL MEDICINE | Facility: CLINIC | Age: 89
End: 2024-10-22
Payer: MEDICARE

## 2024-10-22 NOTE — TELEPHONE ENCOUNTER
Pts wife informed    ----- Message from Marcelle Fields sent at 10/17/2024  9:39 PM EDT -----  MRI elbow advised for further evalaution of elbow nodule. Please ask about all metal in patients body and check w/ radiology next best imaging based on metal present.   sylvester

## 2024-10-24 LAB
ALBUMIN SERPL-MCNC: 3.6 G/DL (ref 3.5–5.2)
ALBUMIN/GLOB SERPL: 1.3 G/DL
ALP SERPL-CCNC: 71 U/L (ref 39–117)
ALT SERPL-CCNC: 13 U/L (ref 1–41)
AST SERPL-CCNC: 16 U/L (ref 1–40)
BILIRUB SERPL-MCNC: 1.1 MG/DL (ref 0–1.2)
BUN SERPL-MCNC: 16 MG/DL (ref 8–23)
BUN/CREAT SERPL: 18.2 (ref 7–25)
CALCIUM SERPL-MCNC: 9.2 MG/DL (ref 8.2–9.6)
CHLORIDE SERPL-SCNC: 101 MMOL/L (ref 98–107)
CHOLEST SERPL-MCNC: 98 MG/DL (ref 0–200)
CO2 SERPL-SCNC: 29.1 MMOL/L (ref 22–29)
CREAT SERPL-MCNC: 0.88 MG/DL (ref 0.76–1.27)
EGFRCR SERPLBLD CKD-EPI 2021: 81.7 ML/MIN/1.73
GLOBULIN SER CALC-MCNC: 2.7 GM/DL
GLUCOSE SERPL-MCNC: 99 MG/DL (ref 65–99)
HBA1C MFR BLD: 5.5 % (ref 4.8–5.6)
HDLC SERPL-MCNC: 27 MG/DL (ref 40–60)
LDLC SERPL CALC-MCNC: 56 MG/DL (ref 0–100)
POTASSIUM SERPL-SCNC: 4.2 MMOL/L (ref 3.5–5.2)
PROT SERPL-MCNC: 6.3 G/DL (ref 6–8.5)
SODIUM SERPL-SCNC: 140 MMOL/L (ref 136–145)
TRIGL SERPL-MCNC: 72 MG/DL (ref 0–150)
VLDLC SERPL CALC-MCNC: 15 MG/DL (ref 5–40)

## 2024-10-28 ENCOUNTER — CONSULT (OUTPATIENT)
Dept: ONCOLOGY | Facility: CLINIC | Age: 89
End: 2024-10-28
Payer: MEDICARE

## 2024-10-28 ENCOUNTER — LAB (OUTPATIENT)
Dept: LAB | Facility: HOSPITAL | Age: 89
End: 2024-10-28
Payer: MEDICARE

## 2024-10-28 VITALS
WEIGHT: 221 LBS | RESPIRATION RATE: 16 BRPM | HEART RATE: 64 BPM | HEIGHT: 70 IN | TEMPERATURE: 97.7 F | DIASTOLIC BLOOD PRESSURE: 79 MMHG | SYSTOLIC BLOOD PRESSURE: 140 MMHG | BODY MASS INDEX: 31.64 KG/M2 | OXYGEN SATURATION: 97 %

## 2024-10-28 DIAGNOSIS — I26.99 PULMONARY EMBOLISM ON RIGHT: Primary | ICD-10-CM

## 2024-10-28 DIAGNOSIS — R79.89 ABNORMAL CBC: Primary | ICD-10-CM

## 2024-10-28 LAB
BASOPHILS # BLD AUTO: 0.06 10*3/MM3 (ref 0–0.2)
BASOPHILS NFR BLD AUTO: 0.6 % (ref 0–1.5)
DEPRECATED RDW RBC AUTO: 48.5 FL (ref 37–54)
EOSINOPHIL # BLD AUTO: 0.36 10*3/MM3 (ref 0–0.4)
EOSINOPHIL NFR BLD AUTO: 3.7 % (ref 0.3–6.2)
ERYTHROCYTE [DISTWIDTH] IN BLOOD BY AUTOMATED COUNT: 14.2 % (ref 12.3–15.4)
HCT VFR BLD AUTO: 46.7 % (ref 37.5–51)
HGB BLD-MCNC: 15.3 G/DL (ref 13–17.7)
IMM GRANULOCYTES # BLD AUTO: 0.04 10*3/MM3 (ref 0–0.05)
IMM GRANULOCYTES NFR BLD AUTO: 0.4 % (ref 0–0.5)
LYMPHOCYTES # BLD AUTO: 1.97 10*3/MM3 (ref 0.7–3.1)
LYMPHOCYTES NFR BLD AUTO: 20.3 % (ref 19.6–45.3)
MCH RBC QN AUTO: 30.7 PG (ref 26.6–33)
MCHC RBC AUTO-ENTMCNC: 32.8 G/DL (ref 31.5–35.7)
MCV RBC AUTO: 93.6 FL (ref 79–97)
MONOCYTES # BLD AUTO: 1.01 10*3/MM3 (ref 0.1–0.9)
MONOCYTES NFR BLD AUTO: 10.4 % (ref 5–12)
NEUTROPHILS NFR BLD AUTO: 6.26 10*3/MM3 (ref 1.7–7)
NEUTROPHILS NFR BLD AUTO: 64.6 % (ref 42.7–76)
NRBC BLD AUTO-RTO: 0 /100 WBC (ref 0–0.2)
PLATELET # BLD AUTO: 188 10*3/MM3 (ref 140–450)
PMV BLD AUTO: 10 FL (ref 6–12)
RBC # BLD AUTO: 4.99 10*6/MM3 (ref 4.14–5.8)
WBC NRBC COR # BLD AUTO: 9.7 10*3/MM3 (ref 3.4–10.8)

## 2024-10-28 PROCEDURE — 85025 COMPLETE CBC W/AUTO DIFF WBC: CPT

## 2024-10-28 PROCEDURE — 36415 COLL VENOUS BLD VENIPUNCTURE: CPT

## 2024-10-28 PROCEDURE — 1126F AMNT PAIN NOTED NONE PRSNT: CPT | Performed by: INTERNAL MEDICINE

## 2024-10-28 PROCEDURE — 99204 OFFICE O/P NEW MOD 45 MIN: CPT | Performed by: INTERNAL MEDICINE

## 2024-10-28 RX ORDER — ASPIRIN 81 MG/1
TABLET, CHEWABLE ORAL
COMMUNITY

## 2024-10-28 RX ORDER — METOPROLOL SUCCINATE 25 MG/1
1 TABLET, EXTENDED RELEASE ORAL DAILY
COMMUNITY

## 2024-10-28 NOTE — PROGRESS NOTES
Subjective     REASON FOR CONSULTATION: Pulmonary embolism  Provide an opinion on any further workup or treatment                             REQUESTING PHYSICIAN: Donnie    RECORDS OBTAINED:  Records of the patients history including those obtained from the referring provider were reviewed and summarized in detail.    HISTORY OF PRESENT ILLNESS:  The patient is a 89 y.o. year old male who is here for an opinion about the above issue.    History of Present Illness   This is a pleasant 89-year-old man who was traveling to [place] September 2024 when he developed shortness of breath and presyncopal symptoms and was evaluated in a local ER outside of [place] with a CT angiogram of the chest showing subsegmental pulmonary emboli in the right lower lobe of the lung.  Lower extremity venous duplex ultrasounds were negative for DVT although the patient does complain of chronic right lower extremity edema.  He was placed on anticoagulation with Xarelto now on the standard daily dosing of 20 mg daily.  The patient has history 10 years ago of being on warfarin for cardiac indications which was discontinued because of recurrent GI bleeding.  He is currently noticing no blood in the stool.  He states he is not particularly prone to falls.    The patient denies any previous history of venous thromboembolism.  He has imaging studies locally do show evidence of peripheral artery disease for which he has been on aspirin therapy.  He denies familial history of venous thrombosis.    Past Medical History:   Diagnosis Date    Abdominal aortic aneurysm     Benign essential hypertension     Coronary atherosclerosis     Esophageal reflux     History of heart attack     Hyperglycemia     Hyperlipidemia     '    Sick sinus syndrome         Past Surgical History:   Procedure Laterality Date    ABDOMINAL AORTIC ANEURYSM REPAIR      CARDIAC PACEMAKER PLACEMENT      CARDIAC PACEMAKER PLACEMENT      CATARACT EXTRACTION      COLONOSCOPY   11/05/2014    [place]     CORONARY ANGIOPLASTY      CORONARY ANGIOPLASTY WITH STENT PLACEMENT      ENDOSCOPY N/A 01/22/2022    Procedure: ESOPHAGOGASTRODUODENOSCOPY FOR FOOD BOLUS;  Surgeon: Alfredo Acevedo MD;  Location: [facility] MAIN OR;  Service: Gastroenterology;  Laterality: N/A;    ENDOSCOPY N/A 04/22/2022    Procedure: ESOPHAGOGASTRODUODENOSCOPY with ballon dialation 15 and 18;  Surgeon: Shimon Luis MD;  Location: [facility] ENDOSCOPY;  Service: Gastroenterology;  Laterality: N/A;  pre- dysphagia, esophageal stricture  post- schatzki's ring, hiatal hernia,     OTHER SURGICAL HISTORY      CATARACT SURGERY    OTHER SURGICAL HISTORY      ROTATOR CUFF REPAIR     UPPER GASTROINTESTINAL ENDOSCOPY  11/03/2014    [place]        Current Outpatient Medications on File Prior to Visit   Medication Sig Dispense Refill    aspirin (Aspirin 81) 81 MG chewable tablet Aspir-81      atorvastatin (LIPITOR) 20 MG tablet Take 1 tablet by mouth Daily.      Cholecalciferol (VITAMIN D3) 5000 UNITS tablet Take by mouth.      Cyanocobalamin (B-12) 1000 MCG capsule Take 1 capsule by mouth.      fluticasone (FLONASE) 50 MCG/ACT nasal spray 2 sprays into the nostril(s) as directed by provider Daily. 16 g 5    levocetirizine (XYZAL) 5 MG tablet Take 1 tablet by mouth Every Evening. 30 tablet 5    Magnesium Oxide 400 (240 Mg) MG tablet Take 1 tablet by mouth Daily.      metoprolol succinate XL (TOPROL-XL) 25 MG 24 hr tablet Take 1 tablet by mouth Daily.      nitroglycerin (NITROSTAT) 0.4 MG SL tablet Place 1 tablet under the tongue.      pantoprazole (PROTONIX) 40 MG EC tablet Take 1 tablet by mouth 2 (Two) Times a Day. 180 tablet 3    rivaroxaban (XARELTO) 20 MG tablet Take 1 tablet by mouth Daily. 90 tablet 1    digoxin (LANOXIN) 125 MCG tablet Take 1 tablet by mouth Daily.      furosemide (LASIX) 40 MG tablet Take 1 tablet by mouth Daily.      lisinopril (PRINIVIL,ZESTRIL) 2.5 MG tablet Take 1 tablet by mouth Daily.      valsartan  "(Diovan) 80 MG tablet Take 1 tablet by mouth Daily. (Patient not taking: Reported on 10/28/2024) 30 tablet 3    [DISCONTINUED] aspirin 325 MG tablet Take 1 tablet by mouth Daily.       No current facility-administered medications on file prior to visit.        ALLERGIES:    Allergies   Allergen Reactions    Iodine Unknown - Low Severity and Rash     rash        Social History     Socioeconomic History    Marital status:      Spouse name: Neda   Tobacco Use    Smoking status: Never    Smokeless tobacco: Never   Vaping Use    Vaping status: Never Used   Substance and Sexual Activity    Alcohol use: Yes     Comment: socially    Drug use: No        Family History   Problem Relation Age of Onset    Leukemia Father     Heart disease Maternal Aunt     Diabetes Daughter         Review of Systems   Constitutional: Negative.    HENT: Negative.     Respiratory:  Positive for shortness of breath (Mild with exertion).    Cardiovascular:  Positive for leg swelling (Right greater than left).   Gastrointestinal: Negative.    Musculoskeletal:  Positive for gait problem (Uses a cane for stability but denies falls).   Allergic/Immunologic: Negative.    Hematological: Negative.    Psychiatric/Behavioral: Negative.            Objective     Vitals:    10/28/24 1358   BP: 140/79   Pulse: 64   Resp: 16   Temp: 97.7 °F (36.5 °C)   TempSrc: Oral   SpO2: 97%   Weight: 100 kg (221 lb)   Height: 177.8 cm (70\")   PainSc: 0-No pain         10/28/2024     2:08 PM   Current Status   ECOG score 1       Physical Exam    CONSTITUTIONAL: pleasant well-developed adult man  HEENT: no icterus, no thrush, moist membranes  LYMPH: no cervical or supraclavicular lad  CV: RRR, S1S2, no murmur  RESP: cta bilat, no wheezing, no rales  GI: soft, nontender, no splenomegaly, +BS  MUSC: 1+ right ankle edema, trace left ankle edema, ambulates with a cane  NEURO: alert and oriented x3, normal strength  PSYCH: normal mood and affect      RECENT " LABS:  Hematology WBC   Date Value Ref Range Status   10/28/2024 9.70 3.40 - 10.80 10*3/mm3 Final   09/21/2024 8.84 3.50 - 11.50 K/uL Final     RBC   Date Value Ref Range Status   10/28/2024 4.99 4.14 - 5.80 10*6/mm3 Final   09/21/2024 4.67 4.30 - 6.10 M/uL Final     Hemoglobin   Date Value Ref Range Status   10/28/2024 15.3 13.0 - 17.7 g/dL Final   09/21/2024 14.7 14.0 - 18.0 g/dL Final   05/19/2023 15.7 13.5 - 17.5 g/dL Final     Hematocrit   Date Value Ref Range Status   10/28/2024 46.7 37.5 - 51.0 % Final   09/21/2024 44.9 40.0 - 54.0 % Final     Platelets   Date Value Ref Range Status   10/28/2024 188 140 - 450 10*3/mm3 Final   09/21/2024 201 150 - 400 K/uL Final        Lab Results   Component Value Date    GLUCOSE 99 10/24/2024    BUN 16 10/24/2024    CREATININE 0.88 10/24/2024     10/24/2024    K 4.2 10/24/2024     10/24/2024    CALCIUM 9.2 10/24/2024    PROTEINTOT 6.3 06/19/2022    ALBUMIN 3.6 10/24/2024    ALT 13 10/24/2024    AST 16 10/24/2024    ALKPHOS 71 10/24/2024    BILITOT 1.1 10/24/2024    GLOB 3.0 06/19/2022    AGRATIO 1.6 01/22/2022    BCR 18.2 10/24/2024    ANIONGAP 9 06/25/2022     CHEST:   Lungs: Respiratory motion artifact is present. 8 mm nodule with   surrounding grouglass, superior segment right lower lobe, series 4   image 120. 10 mm calcified granuloma noted in the inferior right upper   lobe. Dependent and subsegmental atelectasis are evident bilaterally.   Mediastinum/Ro: Coronary atherosclerosis and a stent in the left   anterior descending coronary artery are seen. Dual-lead cardiac pacer   also is evident. Heart is enlarged. Aortic valve calcifications are   seen. Ascending aorta is mildly dilated, 4.1 cm. Central pulmonary   arteries are mildly enlarged.    2.5 cm right thyroid nodule noted with some leftward tracheal   deviation. No adenopathy.   Pleura:  The pleura appear normal.  No effusion.   Chest Wall:  The chest wall appears normal.   UPPER ABDOMEN: 1.9 cm left  hepatic cyst and 1.9 x 1.3 cm left adrenal   adenoma are noted, for which no follow-up is needed. Equivocal for   gallstones. Punctate calcified hepatic and splenic granulomata also   are seen.   BONES:  Bones are osteopenic. Several old left rib fractures are   noted. Degenerative disc disease is present in the thoracic spine.   IMPRESSION:   1.  Motion. Positive for subsegmental pulmonary embolism, in the right   lower lobe. Associated bibasilar atelectasis. No pleural effusion.   2.  8 mm pulmonary nodule, superior segment right lower lobe. If no   prior studies are available for comparison, follow-up exam in 6 months   is suggested.   3.  Cardiomegaly and pulmonary hypertension. Coronary atherosclerosis   with stent. Cardiac pacer.   Left okay not for the  Assessment & Plan     *Subsegmental pulmonary embolism right lung without cor pulmonale, 9/22/2024  Venous thrombosis occurred after airflight travel and high altitude exposure  No prior history of venous thrombosis or familial history of venous thrombosis to the patient's knowledge  *Peripheral artery disease, history of atrial fibrillation with RVR-currently on aspirin therapy after having recurrent GI bleeding while on warfarin (2014)  *Advanced age    Hematology plan/recommendations:  Given that this is the patient's first-time venous thrombosis associated with travel combined with his previous intolerance to anticoagulation (GI bleeding) I would favor a short course of anticoagulation with 3 months of Xarelto and then discontinue with continued antiplatelet therapy, aspirin.  I do not feel a hypercoagulable lab evaluation is indicated given his age, first time thrombosis and negative family history.    Thank you for allowing me to participate in the care of this pleasant patient.  I will see him back as needed.

## 2024-10-29 ENCOUNTER — OFFICE VISIT (OUTPATIENT)
Dept: INTERNAL MEDICINE | Facility: CLINIC | Age: 89
End: 2024-10-29
Payer: MEDICARE

## 2024-10-29 VITALS
BODY MASS INDEX: 31.78 KG/M2 | OXYGEN SATURATION: 97 % | DIASTOLIC BLOOD PRESSURE: 82 MMHG | SYSTOLIC BLOOD PRESSURE: 136 MMHG | WEIGHT: 222 LBS | HEART RATE: 83 BPM | HEIGHT: 70 IN

## 2024-10-29 DIAGNOSIS — M79.89 SOFT TISSUE MASS: ICD-10-CM

## 2024-10-29 DIAGNOSIS — I10 BENIGN ESSENTIAL HYPERTENSION: ICD-10-CM

## 2024-10-29 DIAGNOSIS — I26.99 PULMONARY EMBOLISM ON RIGHT: Primary | ICD-10-CM

## 2024-10-29 PROCEDURE — 1160F RVW MEDS BY RX/DR IN RCRD: CPT | Performed by: INTERNAL MEDICINE

## 2024-10-29 PROCEDURE — 99214 OFFICE O/P EST MOD 30 MIN: CPT | Performed by: INTERNAL MEDICINE

## 2024-10-29 PROCEDURE — 1126F AMNT PAIN NOTED NONE PRSNT: CPT | Performed by: INTERNAL MEDICINE

## 2024-10-29 PROCEDURE — 1159F MED LIST DOCD IN RCRD: CPT | Performed by: INTERNAL MEDICINE

## 2024-10-29 NOTE — PROGRESS NOTES
"Chief Complaint   Patient presents with    Hypertension    Joint Swelling     Right elbow    Pulmonary Embolism       History of Present Illness   Scar Pérez is a 89 y.o. male presents for follow up evaluation. Patient recently w PE after flight. He is now on xarelto. He met w hematology yesterday. He has been advised to continue current medication for total of 3 months. He has not had difficulty w bleeding but does bruise on his hands. He has mild soa at this time \"but nothing like I had at first\".   Patient has a soft tissue mass of his elbow. He had an ultrasound. Likely to be fluid but MRI would be more definitive. Nonpainful \"unless I put a lot of weight on there\".   Bp normotensive on current meds    The following portions of the patient's history were reviewed and updated as appropriate: allergies, current medications, past family history, past medical history, past social history, past surgical history and problem list.  Current Outpatient Medications on File Prior to Visit   Medication Sig Dispense Refill    aspirin (Aspirin 81) 81 MG chewable tablet Aspir-81      atorvastatin (LIPITOR) 20 MG tablet Take 1 tablet by mouth Daily.      Cholecalciferol (VITAMIN D3) 5000 UNITS tablet Take by mouth.      Cyanocobalamin (B-12) 1000 MCG capsule Take 1 capsule by mouth.      fluticasone (FLONASE) 50 MCG/ACT nasal spray 2 sprays into the nostril(s) as directed by provider Daily. 16 g 5    levocetirizine (XYZAL) 5 MG tablet Take 1 tablet by mouth Every Evening. 30 tablet 5    Magnesium Oxide 400 (240 Mg) MG tablet Take 1 tablet by mouth Daily.      metoprolol succinate XL (TOPROL-XL) 25 MG 24 hr tablet Take 1 tablet by mouth Daily.      nitroglycerin (NITROSTAT) 0.4 MG SL tablet Place 1 tablet under the tongue.      pantoprazole (PROTONIX) 40 MG EC tablet Take 1 tablet by mouth 2 (Two) Times a Day. 180 tablet 3    rivaroxaban (XARELTO) 20 MG tablet Take 1 tablet by mouth Daily. 90 tablet 1    valsartan " (Diovan) 80 MG tablet Take 1 tablet by mouth Daily. 30 tablet 3    digoxin (LANOXIN) 125 MCG tablet Take 1 tablet by mouth Daily.      furosemide (LASIX) 40 MG tablet Take 1 tablet by mouth Daily.      lisinopril (PRINIVIL,ZESTRIL) 2.5 MG tablet Take 1 tablet by mouth Daily.       No current facility-administered medications on file prior to visit.     Review of Systems   Constitutional: Negative.    HENT: Negative.     Eyes: Negative.    Respiratory:          Mild shortness of air. Improving from prior.      Cardiovascular: Negative.    Gastrointestinal: Negative.    Endocrine: Negative.    Genitourinary: Negative.    Musculoskeletal:         Right elbow soft tissue mass   Allergic/Immunologic: Negative.    Neurological: Negative.    Hematological: Negative.    Psychiatric/Behavioral: Negative.         Objective   Physical Exam  Vitals and nursing note reviewed.   Constitutional:       Appearance: Normal appearance. He is well-developed.   HENT:      Head: Normocephalic and atraumatic.      Right Ear: Tympanic membrane and external ear normal.      Left Ear: Tympanic membrane and external ear normal.      Nose: Nose normal.      Mouth/Throat:      Mouth: Mucous membranes are moist.   Eyes:      Extraocular Movements: Extraocular movements intact.      Pupils: Pupils are equal, round, and reactive to light.   Cardiovascular:      Rate and Rhythm: Normal rate and regular rhythm.      Pulses: Normal pulses.      Heart sounds: Normal heart sounds.   Pulmonary:      Effort: Pulmonary effort is normal. No respiratory distress.      Breath sounds: Normal breath sounds.   Abdominal:      General: Abdomen is flat.      Palpations: Abdomen is soft.   Musculoskeletal:         General: Normal range of motion.      Cervical back: Normal range of motion and neck supple.   Skin:     General: Skin is warm and dry.   Neurological:      General: No focal deficit present.      Mental Status: He is alert and oriented to person, place,  "and time.   Psychiatric:         Mood and Affect: Mood normal.         Behavior: Behavior normal.         Thought Content: Thought content normal.         Judgment: Judgment normal.          /82   Pulse 83   Ht 177.8 cm (70\")   Wt 101 kg (222 lb)   SpO2 97%   BMI 31.85 kg/m²     Assessment & Plan   Diagnoses and all orders for this visit:    Pulmonary embolism on right    Soft tissue mass  -     MRI elbow right wo contrast; Future    Benign essential hypertension        Patient w PE. He is to complete 3 months total xarelto. He is traveling 12/25 and will stop med after this trip. Encouraged compression stockings, movement, and asa for flights/ travel. He has a right elbow soft tissue mass. Likely lipoma or fluid. Will get MRI. He has a pace maker and will note this on order. Bp has been normotensive. He will cntinue current med and is to monitor. He will f/u in 6 months as previously scheduled.            "

## 2024-11-26 DIAGNOSIS — R22.31 ELBOW MASS, RIGHT: Primary | ICD-10-CM

## 2024-12-10 ENCOUNTER — TELEPHONE (OUTPATIENT)
Dept: INTERNAL MEDICINE | Facility: CLINIC | Age: 89
End: 2024-12-10
Payer: MEDICARE

## 2024-12-16 ENCOUNTER — TELEPHONE (OUTPATIENT)
Dept: INTERNAL MEDICINE | Facility: CLINIC | Age: 89
End: 2024-12-16
Payer: MEDICARE

## 2024-12-18 ENCOUNTER — HOSPITAL ENCOUNTER (OUTPATIENT)
Facility: HOSPITAL | Age: 89
Discharge: HOME | End: 2024-12-18
Admitting: INTERNAL MEDICINE
Payer: MEDICARE

## 2024-12-18 DIAGNOSIS — R22.31 ELBOW MASS, RIGHT: ICD-10-CM

## 2024-12-18 PROCEDURE — 73200 CT UPPER EXTREMITY W/O DYE: CPT

## 2024-12-19 DIAGNOSIS — M70.21 OLECRANON BURSITIS OF RIGHT ELBOW: Primary | ICD-10-CM

## 2024-12-20 ENCOUNTER — TELEPHONE (OUTPATIENT)
Dept: INTERNAL MEDICINE | Facility: CLINIC | Age: 89
End: 2024-12-20
Payer: MEDICARE

## 2024-12-20 ENCOUNTER — OFFICE VISIT (OUTPATIENT)
Dept: INTERNAL MEDICINE | Facility: CLINIC | Age: 89
End: 2024-12-20
Payer: MEDICARE

## 2024-12-20 VITALS
WEIGHT: 230 LBS | SYSTOLIC BLOOD PRESSURE: 130 MMHG | HEIGHT: 70 IN | DIASTOLIC BLOOD PRESSURE: 88 MMHG | BODY MASS INDEX: 32.93 KG/M2 | OXYGEN SATURATION: 98 % | HEART RATE: 81 BPM

## 2024-12-20 DIAGNOSIS — R04.0 EPISTAXIS: ICD-10-CM

## 2024-12-20 DIAGNOSIS — I25.10 ARTERIOSCLEROSIS OF CORONARY ARTERY: ICD-10-CM

## 2024-12-20 DIAGNOSIS — I26.99 PULMONARY EMBOLISM ON RIGHT: ICD-10-CM

## 2024-12-20 DIAGNOSIS — I48.11 LONGSTANDING PERSISTENT ATRIAL FIBRILLATION: Primary | ICD-10-CM

## 2024-12-20 NOTE — PROGRESS NOTES
Transitional Care Follow Up Visit  Subjective     Scar Pérez is a 89 y.o. male who presents for a transitional care management visit.    Within 48 business hours after discharge our office contacted him via telephone to coordinate his care and needs.      I reviewed and discussed the details of that call along with the discharge summary, hospital problems, inpatient lab results, inpatient diagnostic studies, and consultation reports with Scar.     Current outpatient and discharge medications have been reconciled for the patient.  Reviewed by: Marcelle Fields MD          1/22/2022     1:31 PM   Date of TCM Phone Call   [facility]   Date of Admission 1/22/2022   Date of Discharge 1/22/2022   Discharge Disposition Home or Self Care     Risk for Readmission (LACE) No data recorded    History of Present Illness   Course During Hospital Stay:  patient admitted for GI bleed. Prior to gi bleed he went to er for nose bleeding. During hospital he was placed on iv protonix. Patient may have had blood in stool from post nasal bleeding from sinus. Patient has stopped asa. Continued on xarelto. He was not on any nose sprays but notes that he was given a gel for his nose at night before bed and a spray tid and another one bid.   Patient had furosemide held.      The following portions of the patient's history were reviewed and updated as appropriate: allergies, current medications, past family history, past medical history, past social history, past surgical history, and problem list.    Review of Systems   Constitutional: Negative.         Energy slowly returning   HENT:  Positive for sinus pain. Negative for nosebleeds, sneezing and sore throat.    Eyes: Negative.    Respiratory: Negative.     Cardiovascular: Negative.    Gastrointestinal: Negative.  Negative for blood in stool.   Endocrine: Negative.    Genitourinary: Negative.    Musculoskeletal: Negative.    Allergic/Immunologic: Negative.   "  Neurological: Negative.    Hematological: Negative.    Psychiatric/Behavioral: Negative.         Objective   /88   Pulse 81   Ht 177.8 cm (70\")   Wt 104 kg (230 lb)   SpO2 98%   BMI 33.00 kg/m²   Physical Exam  Vitals and nursing note reviewed.   Constitutional:       Appearance: Normal appearance. He is well-developed.   HENT:      Head: Normocephalic and atraumatic.      Right Ear: Tympanic membrane and external ear normal.      Left Ear: Tympanic membrane and external ear normal.      Nose: Nose normal.      Mouth/Throat:      Mouth: Mucous membranes are moist.   Eyes:      Extraocular Movements: Extraocular movements intact.      Pupils: Pupils are equal, round, and reactive to light.   Cardiovascular:      Rate and Rhythm: Normal rate and regular rhythm.      Pulses: Normal pulses.      Heart sounds: Normal heart sounds.   Pulmonary:      Effort: Pulmonary effort is normal. No respiratory distress.      Breath sounds: Normal breath sounds.   Abdominal:      General: Abdomen is flat.      Palpations: Abdomen is soft.   Musculoskeletal:         General: Normal range of motion.      Cervical back: Normal range of motion and neck supple.   Skin:     General: Skin is warm and dry.   Neurological:      General: No focal deficit present.      Mental Status: He is alert and oriented to person, place, and time.   Psychiatric:         Mood and Affect: Mood normal.         Behavior: Behavior normal.         Thought Content: Thought content normal.         Judgment: Judgment normal.       EKG for h/o afib. Patient has ventricular paced EKG. Unchanged prior.     Assessment & Plan   Diagnoses and all orders for this visit:    1. Epistaxis (Primary)  -     Ambulatory Referral to ENT (Otolaryngology)    2. Longstanding persistent atrial fibrillation    3. Arteriosclerosis of coronary artery    4. Pulmonary embolism on right    Patient w recent PE after flight. Has afib and cad. On asa when PE developed. He had " recent nose bleed then to blood in stool.will refer to AENT to see if needs cauterization of a vessel.  H/h has been stable and is retested today. He is swelling off furosemide and will restart this. Monitor potassium. He is traveling this week. Will wait until after travel to [facility] switch off xarelto back to asa.   Has right elbow soft tissue swelling. Ct reveals fluid/ bursitis. Non painful at this time. Will only drain if becomes painful.

## 2024-12-20 NOTE — TELEPHONE ENCOUNTER
----- Message from Marcelle Fields sent at 12/19/2024  5:32 PM EST -----  Ct elbow reveals bursitis. He may see ortho if he would like this drained.   sylvester

## 2025-01-20 ENCOUNTER — OFFICE VISIT (OUTPATIENT)
Dept: INTERNAL MEDICINE | Facility: CLINIC | Age: OVER 89
End: 2025-01-20
Payer: MEDICARE

## 2025-01-20 VITALS
TEMPERATURE: 97.3 F | OXYGEN SATURATION: 95 % | WEIGHT: 220 LBS | BODY MASS INDEX: 31.5 KG/M2 | SYSTOLIC BLOOD PRESSURE: 134 MMHG | DIASTOLIC BLOOD PRESSURE: 82 MMHG | HEART RATE: 86 BPM | HEIGHT: 70 IN

## 2025-01-20 DIAGNOSIS — M25.561 ACUTE PAIN OF RIGHT KNEE: ICD-10-CM

## 2025-01-20 DIAGNOSIS — I10 BENIGN ESSENTIAL HYPERTENSION: ICD-10-CM

## 2025-01-20 DIAGNOSIS — I26.99 PULMONARY EMBOLISM ON RIGHT: Primary | ICD-10-CM

## 2025-01-20 DIAGNOSIS — I25.10 ARTERIOSCLEROSIS OF CORONARY ARTERY: ICD-10-CM

## 2025-01-20 PROCEDURE — 99214 OFFICE O/P EST MOD 30 MIN: CPT | Performed by: INTERNAL MEDICINE

## 2025-01-20 PROCEDURE — 1126F AMNT PAIN NOTED NONE PRSNT: CPT | Performed by: INTERNAL MEDICINE

## 2025-01-20 PROCEDURE — G2211 COMPLEX E/M VISIT ADD ON: HCPCS | Performed by: INTERNAL MEDICINE

## 2025-01-20 RX ORDER — ASPIRIN 81 MG/1
81 TABLET ORAL DAILY
Start: 2025-01-20

## 2025-01-20 NOTE — PROGRESS NOTES
Chief Complaint   Patient presents with    Anemia    Pulmonary Embolism    Hypertension    Knee Pain       History of Present Illness   Scar Pérez is a 89 y.o. male presents for follow up evaluation. Patient notes recently fell at [employer] 4 days ago. A ketchup packet on the floor he slipped and struck right shoulder on soda machine then slid down. Mild discomfort of right knee after episode.   Last month patient to ER w nose bleed/ blood in stool. He went to ENT and has not had any bleeding recently. Hgb improved from 12.8 to 14.2.  Patient is on xarelto for PE. Completed 4 months of therapy.     The following portions of the patient's history were reviewed and updated as appropriate: allergies, current medications, past family history, past medical history, past social history, past surgical history and problem list.  Current Outpatient Medications on File Prior to Visit   Medication Sig Dispense Refill    atorvastatin (LIPITOR) 20 MG tablet Take 1 tablet by mouth Daily.      Cholecalciferol (VITAMIN D3) 5000 UNITS tablet Take by mouth.      fluticasone (FLONASE) 50 MCG/ACT nasal spray 2 sprays into the nostril(s) as directed by provider Daily. 16 g 5    Magnesium Oxide 400 (240 Mg) MG tablet Take 1 tablet by mouth Daily.      metoprolol succinate XL (TOPROL-XL) 25 MG 24 hr tablet Take 1 tablet by mouth Daily.      nitroglycerin (NITROSTAT) 0.4 MG SL tablet Place 1 tablet under the tongue.      pantoprazole (PROTONIX) 40 MG EC tablet Take 1 tablet by mouth 2 (Two) Times a Day. 180 tablet 3    [DISCONTINUED] rivaroxaban (XARELTO) 20 MG tablet Take 1 tablet by mouth Daily. 90 tablet 1    Cyanocobalamin (B-12) 1000 MCG capsule Take 1 capsule by mouth.      digoxin (LANOXIN) 125 MCG tablet Take 1 tablet by mouth Daily.      furosemide (LASIX) 40 MG tablet Take 1 tablet by mouth Daily.      levocetirizine (XYZAL) 5 MG tablet Take 1 tablet by mouth Every Evening. 30 tablet 5    lisinopril (PRINIVIL,ZESTRIL) 2.5 MG  tablet Take 1 tablet by mouth Daily.      valsartan (Diovan) 80 MG tablet Take 1 tablet by mouth Daily. (Patient not taking: Reported on 1/20/2025) 30 tablet 3    [DISCONTINUED] aspirin (Aspirin 81) 81 MG chewable tablet Aspir-81 (Patient not taking: Reported on 1/20/2025)       No current facility-administered medications on file prior to visit.     Review of Systems   Constitutional: Negative.    HENT: Negative.     Eyes: Negative.    Respiratory: Negative.     Cardiovascular: Negative.    Gastrointestinal: Negative.    Endocrine: Negative.    Genitourinary: Negative.    Musculoskeletal:         Right knee pain   Allergic/Immunologic: Negative.    Neurological: Negative.    Hematological: Negative.    Psychiatric/Behavioral: Negative.         Objective   Physical Exam  Vitals and nursing note reviewed.   Constitutional:       Appearance: Normal appearance. He is well-developed.   HENT:      Head: Normocephalic and atraumatic.      Right Ear: Tympanic membrane and external ear normal.      Left Ear: Tympanic membrane and external ear normal.      Nose: Nose normal.      Mouth/Throat:      Mouth: Mucous membranes are moist.   Eyes:      Extraocular Movements: Extraocular movements intact.      Pupils: Pupils are equal, round, and reactive to light.   Cardiovascular:      Rate and Rhythm: Normal rate and regular rhythm.      Pulses: Normal pulses.      Heart sounds: Normal heart sounds.   Pulmonary:      Effort: Pulmonary effort is normal. No respiratory distress.      Breath sounds: Normal breath sounds.   Abdominal:      General: Abdomen is flat.      Palpations: Abdomen is soft.   Musculoskeletal:      Cervical back: Normal range of motion and neck supple.      Comments: Mild right knee tenderness   Skin:     General: Skin is warm and dry.   Neurological:      General: No focal deficit present.      Mental Status: He is alert and oriented to person, place, and time.   Psychiatric:         Mood and Affect: Mood  "normal.         Behavior: Behavior normal.         Thought Content: Thought content normal.         Judgment: Judgment normal.          /82   Pulse 86   Temp 97.3 °F (36.3 °C) (Infrared)   Ht 177.8 cm (70\")   Wt 99.8 kg (220 lb)   SpO2 95%   BMI 31.57 kg/m²     Assessment & Plan   Diagnoses and all orders for this visit:    Pulmonary embolism on right    Acute pain of right knee    Arteriosclerosis of coronary artery    Benign essential hypertension    Other orders  -     aspirin 81 MG EC tablet; Take 1 tablet by mouth Daily.      Patient w h/o pulmonary embolism. Per hematology needed 3 months total treatment oac. Extended this for holiday travel which he has now completed. Will d/c xraelto and restart asa now. He is no longer having epistaxis or any signs of blood in the stool. He is normotensive. He had a fall w minor knee pain but declines referral for further evaluation/tx of this. He will f/u in 6 months or prn.            "

## 2025-02-07 RX ORDER — ATORVASTATIN CALCIUM 20 MG/1
20 TABLET, FILM COATED ORAL DAILY
COMMUNITY

## 2025-03-26 NOTE — TELEPHONE ENCOUNTER
Eboni abstinent from use of alcohol.   Pt calling, requesting to speak with Lila at her earliest convenience please in regards to some new medications-      Best call back number [phone]

## 2025-04-24 DIAGNOSIS — R73.01 IMPAIRED FASTING GLUCOSE: ICD-10-CM

## 2025-04-24 DIAGNOSIS — I10 BENIGN ESSENTIAL HYPERTENSION: Primary | ICD-10-CM

## 2025-04-24 DIAGNOSIS — E87.6 HYPOPOTASSEMIA: ICD-10-CM

## 2025-04-24 DIAGNOSIS — Z12.5 PROSTATE CANCER SCREENING: ICD-10-CM

## 2025-04-24 DIAGNOSIS — E87.5 HYPERKALEMIA: ICD-10-CM

## 2025-04-24 DIAGNOSIS — D64.9 ANEMIA, UNSPECIFIED TYPE: ICD-10-CM

## 2025-04-24 DIAGNOSIS — E78.00 PURE HYPERCHOLESTEROLEMIA: ICD-10-CM

## 2025-05-02 LAB
ALBUMIN SERPL-MCNC: 3.9 G/DL (ref 3.5–5.2)
ALBUMIN/GLOB SERPL: 1.6 G/DL
ALP SERPL-CCNC: 63 U/L (ref 39–117)
ALT SERPL-CCNC: 17 U/L (ref 1–41)
APPEARANCE UR: CLEAR
AST SERPL-CCNC: 21 U/L (ref 1–40)
BACTERIA #/AREA URNS HPF: ABNORMAL /[HPF]
BASOPHILS # BLD AUTO: 0.04 10*3/MM3 (ref 0–0.2)
BASOPHILS NFR BLD AUTO: 0.6 % (ref 0–1.5)
BILIRUB SERPL-MCNC: 0.9 MG/DL (ref 0–1.2)
BILIRUB UR QL STRIP: NEGATIVE
BUN SERPL-MCNC: 17 MG/DL (ref 8–23)
BUN/CREAT SERPL: 19.1 (ref 7–25)
CALCIUM SERPL-MCNC: 9.4 MG/DL (ref 8.2–9.6)
CASTS URNS QL MICRO: ABNORMAL /LPF
CHLORIDE SERPL-SCNC: 103 MMOL/L (ref 98–107)
CHOLEST SERPL-MCNC: 110 MG/DL (ref 0–200)
CO2 SERPL-SCNC: 30.9 MMOL/L (ref 22–29)
COLOR UR: YELLOW
CREAT SERPL-MCNC: 0.89 MG/DL (ref 0.76–1.27)
EGFRCR SERPLBLD CKD-EPI 2021: 81.4 ML/MIN/1.73
EOSINOPHIL # BLD AUTO: 0.28 10*3/MM3 (ref 0–0.4)
EOSINOPHIL NFR BLD AUTO: 4.3 % (ref 0.3–6.2)
EPI CELLS #/AREA URNS HPF: ABNORMAL /HPF (ref 0–10)
ERYTHROCYTE [DISTWIDTH] IN BLOOD BY AUTOMATED COUNT: 13.7 % (ref 12.3–15.4)
GLOBULIN SER CALC-MCNC: 2.4 GM/DL
GLUCOSE SERPL-MCNC: 103 MG/DL (ref 65–99)
GLUCOSE UR QL STRIP: NEGATIVE
HBA1C MFR BLD: 5.9 % (ref 4.8–5.6)
HCT VFR BLD AUTO: 43.1 % (ref 37.5–51)
HDLC SERPL-MCNC: 28 MG/DL (ref 40–60)
HGB BLD-MCNC: 13.9 G/DL (ref 13–17.7)
HGB UR QL STRIP: NEGATIVE
IMM GRANULOCYTES # BLD AUTO: 0.01 10*3/MM3 (ref 0–0.05)
IMM GRANULOCYTES NFR BLD AUTO: 0.2 % (ref 0–0.5)
KETONES UR QL STRIP: NEGATIVE
LDLC SERPL CALC-MCNC: 66 MG/DL (ref 0–100)
LEUKOCYTE ESTERASE UR QL STRIP: NEGATIVE
LYMPHOCYTES # BLD AUTO: 1.45 10*3/MM3 (ref 0.7–3.1)
LYMPHOCYTES NFR BLD AUTO: 22.2 % (ref 19.6–45.3)
MCH RBC QN AUTO: 30.1 PG (ref 26.6–33)
MCHC RBC AUTO-ENTMCNC: 32.3 G/DL (ref 31.5–35.7)
MCV RBC AUTO: 93.3 FL (ref 79–97)
MICRO URNS: ABNORMAL
MONOCYTES # BLD AUTO: 0.78 10*3/MM3 (ref 0.1–0.9)
MONOCYTES NFR BLD AUTO: 11.9 % (ref 5–12)
NEUTROPHILS # BLD AUTO: 3.98 10*3/MM3 (ref 1.7–7)
NEUTROPHILS NFR BLD AUTO: 60.8 % (ref 42.7–76)
NITRITE UR QL STRIP: NEGATIVE
NRBC BLD AUTO-RTO: 0 /100 WBC (ref 0–0.2)
PH UR STRIP: 5.5 [PH] (ref 5–7.5)
PLATELET # BLD AUTO: 186 10*3/MM3 (ref 140–450)
POTASSIUM SERPL-SCNC: 4.6 MMOL/L (ref 3.5–5.2)
PROT SERPL-MCNC: 6.3 G/DL (ref 6–8.5)
PROT UR QL STRIP: ABNORMAL
PSA SERPL-MCNC: 1.6 NG/ML (ref 0–4)
RBC # BLD AUTO: 4.62 10*6/MM3 (ref 4.14–5.8)
RBC #/AREA URNS HPF: ABNORMAL /HPF (ref 0–2)
SODIUM SERPL-SCNC: 143 MMOL/L (ref 136–145)
SP GR UR STRIP: 1.02 (ref 1–1.03)
TRIGL SERPL-MCNC: 78 MG/DL (ref 0–150)
TSH SERPL DL<=0.005 MIU/L-ACNC: 1.45 UIU/ML (ref 0.27–4.2)
URINALYSIS REFLEX: ABNORMAL
UROBILINOGEN UR STRIP-MCNC: 1 MG/DL (ref 0.2–1)
VLDLC SERPL CALC-MCNC: 16 MG/DL (ref 5–40)
WBC # BLD AUTO: 6.54 10*3/MM3 (ref 3.4–10.8)
WBC #/AREA URNS HPF: ABNORMAL /HPF (ref 0–5)

## 2025-05-06 ENCOUNTER — OFFICE VISIT (OUTPATIENT)
Dept: INTERNAL MEDICINE | Facility: CLINIC | Age: OVER 89
End: 2025-05-06
Payer: MEDICARE

## 2025-05-06 VITALS
WEIGHT: 224 LBS | BODY MASS INDEX: 32.07 KG/M2 | OXYGEN SATURATION: 96 % | HEART RATE: 64 BPM | DIASTOLIC BLOOD PRESSURE: 82 MMHG | SYSTOLIC BLOOD PRESSURE: 150 MMHG | HEIGHT: 70 IN

## 2025-05-06 DIAGNOSIS — R60.0 PERIPHERAL EDEMA: ICD-10-CM

## 2025-05-06 DIAGNOSIS — H61.23 BILATERAL IMPACTED CERUMEN: ICD-10-CM

## 2025-05-06 DIAGNOSIS — Z00.00 HEALTHCARE MAINTENANCE: Primary | ICD-10-CM

## 2025-05-06 DIAGNOSIS — I48.11 LONGSTANDING PERSISTENT ATRIAL FIBRILLATION: ICD-10-CM

## 2025-05-06 DIAGNOSIS — R73.01 IMPAIRED FASTING GLUCOSE: ICD-10-CM

## 2025-05-06 DIAGNOSIS — G47.33 OBSTRUCTIVE APNEA: ICD-10-CM

## 2025-05-06 DIAGNOSIS — I10 BENIGN ESSENTIAL HYPERTENSION: ICD-10-CM

## 2025-05-06 RX ORDER — MIRABEGRON 25 MG/1
25 TABLET, FILM COATED, EXTENDED RELEASE ORAL
COMMUNITY

## 2025-05-06 NOTE — PROGRESS NOTES
Subjective   The ABCs of the Annual Wellness Visit  Medicare Wellness Visit      Scar Pérez is a 89 y.o. patient who presents for a Medicare Wellness Visit.    The following portions of the patient's history were reviewed and   updated as appropriate: allergies, current medications, past family history, past medical history, past social history, past surgical history, and problem list.    Compared to one year ago, the patient's physical   health is the same.  Compared to one year ago, the patient's mental   health is the same.    Recent Hospitalizations:  He was admitted to [facility] during the last year. PE    Current Medical Providers:  Patient Care Team:  Marcelle Fields MD as PCP - General (Internal Medicine)  Sami Fay MD (Inactive) as Consulting Physician (Orthopedic Surgery)  Jeannie Palomo MD (Inactive) (Dermatology)  Yomi Delgadillo DPM as Consulting Physician (Podiatry)  Devon Florez MD (Vascular Surgery)  Dorian Connor MD as Consulting Physician (Cardiology)  Apolinar Zamora MD as Consulting Physician (Hematology and Oncology)  Marcelle Fields MD as Referring Physician (Internal Medicine)    Outpatient Medications Prior to Visit   Medication Sig Dispense Refill    aspirin 81 MG EC tablet Take 1 tablet by mouth Daily.      atorvastatin (LIPITOR) 20 MG tablet Take 1 tablet by mouth Daily.      Cholecalciferol (VITAMIN D3) 5000 UNITS tablet Take by mouth.      Cyanocobalamin (B-12) 1000 MCG capsule Take 1 capsule by mouth.      digoxin (LANOXIN) 125 MCG tablet Take 1 tablet by mouth Daily.      furosemide (LASIX) 40 MG tablet Take 1 tablet by mouth Daily.      lisinopril (PRINIVIL,ZESTRIL) 2.5 MG tablet Take 1 tablet by mouth Daily.      Magnesium Oxide 400 (240 Mg) MG tablet Take 1 tablet by mouth Daily.      metoprolol succinate XL (TOPROL-XL) 25 MG 24 hr tablet Take 1 tablet by mouth Daily.      Mirabegron ER (Myrbetriq) 25 MG  tablet sustained-release 24 hour 24 hr tablet Take 1 tablet by mouth.      nitroglycerin (NITROSTAT) 0.4 MG SL tablet Place 1 tablet under the tongue.      pantoprazole (PROTONIX) 40 MG EC tablet Take 1 tablet by mouth 2 (Two) Times a Day. 180 tablet 3    atorvastatin (LIPITOR) 20 MG tablet Take 1 tablet by mouth Daily. (Patient not taking: Reported on 5/6/2025)      fluticasone (FLONASE) 50 MCG/ACT nasal spray 2 sprays into the nostril(s) as directed by provider Daily. 16 g 5    levocetirizine (XYZAL) 5 MG tablet Take 1 tablet by mouth Every Evening. 30 tablet 5    valsartan (Diovan) 80 MG tablet Take 1 tablet by mouth Daily. (Patient not taking: Reported on 12/20/2024) 30 tablet 3     No facility-administered medications prior to visit.     No opioid medication identified on active medication list. I have reviewed chart for other potential  high risk medication/s and harmful drug interactions in the elderly.      Aspirin is on active medication list. Aspirin use is indicated based on review of current medical condition/s. Pros and cons of this therapy have been discussed today. Benefits of this medication outweigh potential harm.  Patient has been encouraged to continue taking this medication.  .      Patient Active Problem List   Diagnosis    Absolute anemia    A-fib    Arteriosclerosis of coronary artery    Bleeding gastrointestinal    Lipoma of skin and subcutaneous tissue    Obstructive apnea    Hyperkalemia    Benign essential hypertension    Pure hypercholesterolemia    Impaired fasting glucose    Right upper lobe pneumonia    Hypopotassemia    Gastroesophageal reflux disease    Esophageal dysphagia    Pulmonary embolism on right     Advance Care Planning Advance Directive is not on file.  ACP discussion was held with the patient during this visit. Patient does not have an advance directive, information provided.            Objective   Vitals:    05/06/25 1043   BP: 150/82   Pulse: 64   SpO2: 96%   Weight:  "102 kg (224 lb)   Height: 177.8 cm (70\")   PainSc: 0-No pain       Estimated body mass index is 32.14 kg/m² as calculated from the following:    Height as of this encounter: 177.8 cm (70\").    Weight as of this encounter: 102 kg (224 lb).    BMI is >= 30 and <35. (Class 1 Obesity). The following options were offered after discussion;: exercise counseling/recommendations and nutrition counseling/recommendations           Does the patient have evidence of cognitive impairment? No  Lab Results   Component Value Date    CHLPL 110 2025    TRIG 78 2025    HDL 28 (L) 2025    LDL 66 2025    VLDL 16 2025    HGBA1C 5.90 (H) 2025                                                                                                Health  Risk Assessment    Smoking Status:  Social History     Tobacco Use   Smoking Status Never   Smokeless Tobacco Never     Alcohol Consumption:  Social History     Substance and Sexual Activity   Alcohol Use Yes    Comment: socially       Fall Risk Screen  STEADI Fall Risk Assessment was completed, and patient is at MODERATE risk for falls. Assessment completed on:2025    Depression Screening   Little interest or pleasure in doing things? Not at all   Feeling down, depressed, or hopeless? Not at all   PHQ-2 Total Score 0      Health Habits and Functional and Cognitive Screenin/6/2025    10:43 AM   Functional & Cognitive Status   Do you have difficulty preparing food and eating? No   Do you have difficulty bathing yourself, getting dressed or grooming yourself? No   Do you have difficulty using the toilet? No   Do you have difficulty moving around from place to place? No   Do you have trouble with steps or getting out of a bed or a chair? No   Dental Exam Up to date   Eye Exam Up to date   Do you need help using the phone?  No   Are you deaf or do you have serious difficulty hearing?  Yes   Do you need help to go to places out of walking distance? No   Do " you need help shopping? No   Do you need help preparing meals?  No   Do you need help with housework?  No   Do you need help with laundry? No   Do you need help taking your medications? No   Do you need help managing money? No   Do you ever drive or ride in a car without wearing a seat belt? No   Have you felt unusual stress, anger or loneliness in the last month? No   Who do you live with? Spouse   If you need help, do you have trouble finding someone available to you? No   Have you been bothered in the last four weeks by sexual problems? No   Do you have difficulty concentrating, remembering or making decisions? No           Age-appropriate Screening Schedule:  Refer to the list below for future screening recommendations based on patient's age, sex and/or medical conditions. Orders for these recommended tests are listed in the plan section. The patient has been provided with a written plan.    Health Maintenance List  Health Maintenance   Topic Date Due    RSV Vaccine - Adults (1 - 1-dose 75+ series) Never done    INFLUENZA VACCINE  07/01/2025    LIPID PANEL  05/01/2026    ANNUAL WELLNESS VISIT  05/06/2026    TDAP/TD VACCINES (6 - Td or Tdap) 11/29/2033    COVID-19 Vaccine  Completed    Pneumococcal Vaccine 50+  Completed    ZOSTER VACCINE  Completed                                                                                                                                                CMS Preventative Services Quick Reference  Risk Factors Identified During Encounter  Immunizations Discussed/Encouraged: COVID19    The above risks/problems have been discussed with the patient.  Pertinent information has been shared with the patient in the After Visit Summary.  An After Visit Summary and PPPS were made available to the patient.    Follow Up:   Next Medicare Wellness visit to be scheduled in 1 year.         Additional E&M Note during same encounter follows:  Patient has additional, significant, and separately  "identifiable condition(s)/problem(s) that require work above and beyond the Medicare Wellness Visit     Chief Complaint  Annual Exam    Subjective   HPI  Scar is also being seen today for an annual adult preventative physical exam.  and Scar is also being seen today for additional medical problem/s.patient w cad, htn, kenan, hyperlipidemia, oab. Patient is doing well. To cardiology yesterday w good evaluation. To have repeat echo. Patient slightly elevated bp on arrival but normotensive at home. Lipids close to goal. Slightly increased glucose.   Wears hearing aids. Reports not working well. .                   Objective   Vital Signs:  /82   Pulse 64   Ht 177.8 cm (70\")   Wt 102 kg (224 lb)   SpO2 96%   BMI 32.14 kg/m²   Physical Exam  Vitals and nursing note reviewed.   Constitutional:       Appearance: Normal appearance. He is well-developed.   HENT:      Head: Normocephalic and atraumatic.      Right Ear: Tympanic membrane and external ear normal. There is impacted cerumen.      Left Ear: Tympanic membrane and external ear normal. There is impacted cerumen.      Ears:      Comments: B cerumen impaction. Utilized lighted curette to remove large volume B. Normal tm visualized after removal.      Nose: Nose normal.      Mouth/Throat:      Mouth: Mucous membranes are moist.   Eyes:      Extraocular Movements: Extraocular movements intact.      Pupils: Pupils are equal, round, and reactive to light.   Cardiovascular:      Rate and Rhythm: Normal rate and regular rhythm.      Pulses: Normal pulses.      Heart sounds: Normal heart sounds.   Pulmonary:      Effort: Pulmonary effort is normal. No respiratory distress.      Breath sounds: Normal breath sounds.   Abdominal:      General: Abdomen is flat.      Palpations: Abdomen is soft.   Musculoskeletal:         General: Normal range of motion.      Cervical back: Normal range of motion and neck supple.   Skin:     General: Skin is warm and dry. " "  Neurological:      General: No focal deficit present.      Mental Status: He is alert and oriented to person, place, and time.   Psychiatric:         Mood and Affect: Mood normal.         Behavior: Behavior normal.         Thought Content: Thought content normal.         Judgment: Judgment normal.       Left ankle circ 11 1/2 \" right 10 3/4 \"     The following data was reviewed by: Marcelle Fields MD on 05/06/2025:  Data reviewed : Consultant notes cardiology  Common labs          10/24/2024    09:10 10/28/2024    13:21 5/1/2025    08:06   Common Labs   Glucose 99   103    BUN 16   17    Creatinine 0.88   0.89    Sodium 140   143    Potassium 4.2   4.6    Chloride 101   103    Calcium 9.2   9.4    Albumin 3.6   3.9    Total Bilirubin 1.1   0.9    Alkaline Phosphatase 71   63    AST (SGOT) 16   21    ALT (SGPT) 13   17    WBC  9.70  6.54    Hemoglobin  15.3  13.9    Hematocrit  46.7  43.1    Platelets  188  186    Total Cholesterol 98   110    Triglycerides 72   78    HDL Cholesterol 27   28    LDL Cholesterol  56   66    Hemoglobin A1C 5.50   5.90    PSA   1.600           Assessment and Plan Additional age appropriate preventative wellness advice topics were discussed during today's preventative wellness exam(some topics already addressed during AWV portion of the note above):    Physical Activity: Advised cardiovascular activity 150 minutes per week as tolerated. (example brisk walk for 30 minutes, 5 days a week).     Nutrition: Discussed nutrition plan with patient. Information shared in after visit summary. Goal is for a well balanced diet to enhance overall health.     Healthy Weight: Discussed current and goal BMI with patient. Steps to attain this goal discussed. Information shared in after visit summary.     Injury Prevention Discussion:  Information shared in after visit summary.           Healthcare maintenance  To continue all routine hcm       Longstanding persistent atrial fibrillation  Continue " current per cards       Benign essential hypertension  Bp slightly elevated today but has not taken furosemide. Will monitor when taking all meds and compliance encouraged.          Impaired fasting glucose  Dietary counseling given and to increase movement.        Obstructive apnea  Cpap hs       -cerumen impaction- removed  Peripheral edema  Likely due to veinous insufficiency. Gave rx for compression stockings w low weight compression related to difficulty putting on. Patient to elevate legs when seated. He has not taken furosemide and compliance is encouraged.          Bilateral impacted cerumen          I spent 55 minutes caring for Scar on this date of service. This time includes time spent by me in the following activities:preparing for the visit, reviewing tests, obtaining and/or reviewing a separately obtained history, performing a medically appropriate examination and/or evaluation , counseling and educating the patient/family/caregiver, ordering medications, tests, or procedures, referring and communicating with other health care professionals , documenting information in the medical record, and independently interpreting results and communicating that information with the patient/family/caregiver  Follow Up   Return in about 6 months (around 11/6/2025) for Recheck.  Patient was given instructions and counseling regarding his condition or for health maintenance advice. Please see specific information pulled into the AVS if appropriate.

## 2025-05-06 NOTE — ASSESSMENT & PLAN NOTE
Bp slightly elevated today but has not taken furosemide. Will monitor when taking all meds and compliance encouraged.

## 2025-07-15 ENCOUNTER — READMISSION MANAGEMENT (OUTPATIENT)
Dept: CALL CENTER | Facility: HOSPITAL | Age: OVER 89
End: 2025-07-15
Payer: MEDICARE

## 2025-07-15 NOTE — OUTREACH NOTE
Prep Survey      Flowsheet Row Responses   [facility] facility patient discharged from? Non-[facility]   Is LACE score < 7 ? Non-BH Discharge   Eligibility [facility]   Date of Admission 07/13/25   Date of Discharge 07/15/25   Discharge Disposition Home or Self Care   Discharge diagnosis Chest pain, unspecified type (Primary Dx),  Coronary artery disease, unspecified vessel or lesion type, unspecified whether angina present, unspecified whether native or transplanted heart,  Acute on chronic congestive heart failure,   Does the patient have one of the following disease processes/diagnoses(primary or secondary)? CHF   Prep survey completed? Yes            Marina HICKS - Registered Nurse

## 2025-07-16 ENCOUNTER — TRANSITIONAL CARE MANAGEMENT TELEPHONE ENCOUNTER (OUTPATIENT)
Dept: CALL CENTER | Facility: HOSPITAL | Age: OVER 89
End: 2025-07-16
Payer: MEDICARE

## 2025-07-16 NOTE — OUTREACH NOTE
Call Center TCM Note      Flowsheet Row Responses   [facility] facility patient discharged from? Non-  [[facility]]   Does the patient have one of the following disease processes/diagnoses(primary or secondary)? CHF   TCM attempt successful? No   Unsuccessful attempts Attempt 1            ABDULAZIZ Jacob Registered Nurse    7/16/2025, 15:24 EDT

## 2025-07-16 NOTE — OUTREACH NOTE
Call Center TCM Note      Flowsheet Row Responses   [facility] patient discharged from? Non-  [[facility]]   Does the patient have one of the following disease processes/diagnoses(primary or secondary)? CHF   TCM attempt successful? Yes   Call start time 1643   Call end time 1646   Discharge diagnosis Chest pain, unspecified type (Primary Dx),  Coronary artery disease, unspecified vessel or lesion type, unspecified whether angina present, unspecified whether native or transplanted heart,  Acute on chronic congestive heart failure,   Is the patient taking all medications as directed (includes completed medication regime)? Yes   Comments Hospital follow up with PCP 7/21.   Does the patient have an appointment with their PCP within 7-14 days of discharge? Yes   Psychosocial issues? No   What is the patient's perception of their health status since discharge? Improving   Is the patient/caregiver able to teach back the hierarchy of who to call/visit for symptoms/problems? PCP, Specialist, Home health nurse, Urgent Care, ED, 911 Yes   TCM call completed? Yes   Wrap up additional comments Patient reports doing okay. No questions or concerns at this time. Hospital follow up with PCP 7/21.   Call end time 1646   Would this patient benefit from a Referral to Amb Social Work? No   Is the patient interested in additional calls from an ambulatory ? No            ABDULAZIZ HOOPER - Registered Nurse    7/16/2025, 16:46 EDT

## 2025-07-21 ENCOUNTER — OFFICE VISIT (OUTPATIENT)
Dept: INTERNAL MEDICINE | Facility: CLINIC | Age: OVER 89
End: 2025-07-21
Payer: MEDICARE

## 2025-07-21 VITALS
OXYGEN SATURATION: 96 % | HEIGHT: 70 IN | HEART RATE: 81 BPM | SYSTOLIC BLOOD PRESSURE: 122 MMHG | BODY MASS INDEX: 29.78 KG/M2 | DIASTOLIC BLOOD PRESSURE: 74 MMHG | WEIGHT: 208 LBS

## 2025-07-21 DIAGNOSIS — D64.9 ANEMIA, UNSPECIFIED TYPE: ICD-10-CM

## 2025-07-21 DIAGNOSIS — I50.23 ACUTE ON CHRONIC SYSTOLIC CONGESTIVE HEART FAILURE: Primary | ICD-10-CM

## 2025-07-21 DIAGNOSIS — I10 BENIGN ESSENTIAL HYPERTENSION: ICD-10-CM

## 2025-07-21 NOTE — PROGRESS NOTES
Transitional Care Follow Up Visit  Subjective     Scar Pérez is a 89 y.o. male who presents for a transitional care management visit.    Within 48 business hours after discharge our office contacted him via telephone to coordinate his care and needs.      I reviewed and discussed the details of that call along with the discharge summary, hospital problems, inpatient lab results, inpatient diagnostic studies, and consultation reports with Scar.     Current outpatient and discharge medications have been reconciled for the patient.  Reviewed by: Marcelle Fields MD          7/15/2025     2:21 PM   Date of TCM Phone Call   [facility]   Date of Admission 7/13/2025   Date of Discharge 7/15/2025   Discharge Disposition Home or Self Care     Risk for Readmission (LACE) No data recorded    History of Present Illness   Course During Hospital Stay:  patient w acute shortness of breath and cp to ER. Admitted and diagnosed with CHF exacerbation. He was diuresed with iv lasix. Now furosemide increased to 40 mg twice daily. He notes that he is out of bed every 3 hours.   He is also rx lisinopril and valsartan.   Notes that he continues to have some lower level shortness of breath w reduced exercise tolerance.        The following portions of the patient's history were reviewed and updated as appropriate: allergies, current medications, past family history, past medical history, past social history, past surgical history, and problem list.    Review of Systems   Constitutional:  Positive for fatigue.   HENT: Negative.     Eyes: Negative.    Respiratory:  Positive for shortness of breath.    Cardiovascular:         Leg swelling much improved   Gastrointestinal: Negative.    Endocrine: Negative.    Genitourinary:         Increased urine with    Musculoskeletal: Negative.    Allergic/Immunologic: Negative.    Neurological: Negative.    Hematological: Negative.    Psychiatric/Behavioral: Negative.    "      Objective   /74   Pulse 81   Ht 177.8 cm (70\")   Wt 94.3 kg (208 lb)   SpO2 96%   BMI 29.84 kg/m²   Physical Exam  Vitals and nursing note reviewed.   Constitutional:       Appearance: Normal appearance. He is well-developed.   HENT:      Head: Normocephalic and atraumatic.      Right Ear: Tympanic membrane and external ear normal.      Left Ear: Tympanic membrane and external ear normal.      Nose: Nose normal.      Mouth/Throat:      Mouth: Mucous membranes are moist.   Eyes:      Extraocular Movements: Extraocular movements intact.      Pupils: Pupils are equal, round, and reactive to light.   Cardiovascular:      Rate and Rhythm: Normal rate and regular rhythm.      Pulses: Normal pulses.      Heart sounds: Normal heart sounds.      Comments: Leg swelling 10.5\" B  Pulmonary:      Effort: Pulmonary effort is normal. No respiratory distress.      Breath sounds: Normal breath sounds.   Abdominal:      General: Abdomen is flat.      Palpations: Abdomen is soft.   Musculoskeletal:         General: Normal range of motion.      Cervical back: Normal range of motion and neck supple.   Skin:     General: Skin is warm and dry.   Neurological:      General: No focal deficit present.      Mental Status: He is alert and oriented to person, place, and time.   Psychiatric:         Mood and Affect: Mood normal.         Behavior: Behavior normal.         Thought Content: Thought content normal.         Judgment: Judgment normal.         Assessment & Plan   Diagnoses and all orders for this visit:    1. Acute on chronic systolic congestive heart failure (Primary)  -     CBC & Differential  -     Basic Metabolic Panel  -     Vitamin B12  -     Folate  -     Iron Profile w/o Ferritin    2. Anemia, unspecified type  -     CBC & Differential  -     Basic Metabolic Panel  -     Vitamin B12  -     Folate  -     Iron Profile w/o Ferritin    3. Benign essential hypertension    Hospital follow up for acute on chronic chf. " Patient is below baseline activity but improved from hospital admission. Will monitor walking o2. He is to gradually increase movement as tolerated. He is noted to have reduced h/h in hospital. Will repeat levels today. He will have renal function and electrolytes tested. F/u here in

## 2025-07-22 ENCOUNTER — RESULTS FOLLOW-UP (OUTPATIENT)
Dept: INTERNAL MEDICINE | Facility: CLINIC | Age: OVER 89
End: 2025-07-22
Payer: MEDICARE

## 2025-07-22 LAB
BASOPHILS # BLD AUTO: 0.1 X10E3/UL (ref 0–0.2)
BASOPHILS NFR BLD AUTO: 1 %
BUN SERPL-MCNC: 23 MG/DL (ref 10–36)
BUN/CREAT SERPL: 23 (ref 10–24)
CALCIUM SERPL-MCNC: 9.4 MG/DL (ref 8.6–10.2)
CHLORIDE SERPL-SCNC: 97 MMOL/L (ref 96–106)
CO2 SERPL-SCNC: 27 MMOL/L (ref 20–29)
CREAT SERPL-MCNC: 0.99 MG/DL (ref 0.76–1.27)
EGFRCR SERPLBLD CKD-EPI 2021: 72 ML/MIN/1.73
EOSINOPHIL # BLD AUTO: 0.6 X10E3/UL (ref 0–0.4)
EOSINOPHIL NFR BLD AUTO: 7 %
ERYTHROCYTE [DISTWIDTH] IN BLOOD BY AUTOMATED COUNT: 14 % (ref 11.6–15.4)
FOLATE SERPL-MCNC: 5.4 NG/ML
GLUCOSE SERPL-MCNC: 77 MG/DL (ref 70–99)
HCT VFR BLD AUTO: 44.5 % (ref 37.5–51)
HGB BLD-MCNC: 14.4 G/DL (ref 13–17.7)
IMM GRANULOCYTES # BLD AUTO: 0 X10E3/UL (ref 0–0.1)
IMM GRANULOCYTES NFR BLD AUTO: 0 %
IRON SATN MFR SERPL: 31 % (ref 15–55)
IRON SERPL-MCNC: 93 UG/DL (ref 38–169)
LYMPHOCYTES # BLD AUTO: 1.6 X10E3/UL (ref 0.7–3.1)
LYMPHOCYTES NFR BLD AUTO: 20 %
MCH RBC QN AUTO: 31.4 PG (ref 26.6–33)
MCHC RBC AUTO-ENTMCNC: 32.4 G/DL (ref 31.5–35.7)
MCV RBC AUTO: 97 FL (ref 79–97)
MONOCYTES # BLD AUTO: 0.9 X10E3/UL (ref 0.1–0.9)
MONOCYTES NFR BLD AUTO: 11 %
NEUTROPHILS # BLD AUTO: 4.9 X10E3/UL (ref 1.4–7)
NEUTROPHILS NFR BLD AUTO: 61 %
PLATELET # BLD AUTO: 194 X10E3/UL (ref 150–450)
POTASSIUM SERPL-SCNC: 4.1 MMOL/L (ref 3.5–5.2)
RBC # BLD AUTO: 4.59 X10E6/UL (ref 4.14–5.8)
SODIUM SERPL-SCNC: 139 MMOL/L (ref 134–144)
TIBC SERPL-MCNC: 297 UG/DL (ref 250–450)
UIBC SERPL-MCNC: 204 UG/DL (ref 111–343)
VIT B12 SERPL-MCNC: 1070 PG/ML (ref 232–1245)
WBC # BLD AUTO: 7.9 X10E3/UL (ref 3.4–10.8)

## (undated) DEVICE — TUBING, SUCTION, 1/4" X 10', STRAIGHT: Brand: MEDLINE

## (undated) DEVICE — DEV INFL CRE STERIFLATE 60CC DISP

## (undated) DEVICE — KT ORCA ORCAPOD DISP STRL

## (undated) DEVICE — LN SMPL CO2 SHTRM SD STREAM W/M LUER

## (undated) DEVICE — SENSR O2 OXIMAX FNGR A/ 18IN NONSTR

## (undated) DEVICE — BITEBLOCK OMNI BLOC

## (undated) DEVICE — ESOPHAGEAL BALLOON DILATATION CATHETER: Brand: CRE FIXED WIRE

## (undated) DEVICE — FRCP BX RADJAW4 NDL 2.8 240CM LG OG BX40

## (undated) DEVICE — CANN O2 ETCO2 FITS ALL CONN CO2 SMPL A/ 7IN DISP LF

## (undated) DEVICE — ADAPT CLN BIOGUARD AIR/H2O DISP